# Patient Record
Sex: MALE | Race: WHITE | NOT HISPANIC OR LATINO | URBAN - METROPOLITAN AREA
[De-identification: names, ages, dates, MRNs, and addresses within clinical notes are randomized per-mention and may not be internally consistent; named-entity substitution may affect disease eponyms.]

---

## 2017-12-07 ENCOUNTER — APPOINTMENT (RX ONLY)
Dept: URBAN - METROPOLITAN AREA CLINIC 4 | Facility: CLINIC | Age: 62
Setting detail: DERMATOLOGY
End: 2017-12-07

## 2017-12-07 DIAGNOSIS — L81.4 OTHER MELANIN HYPERPIGMENTATION: ICD-10-CM

## 2017-12-07 DIAGNOSIS — L57.8 OTHER SKIN CHANGES DUE TO CHRONIC EXPOSURE TO NONIONIZING RADIATION: ICD-10-CM

## 2017-12-07 DIAGNOSIS — D18.0 HEMANGIOMA: ICD-10-CM

## 2017-12-07 DIAGNOSIS — D22 MELANOCYTIC NEVI: ICD-10-CM

## 2017-12-07 DIAGNOSIS — L85.3 XEROSIS CUTIS: ICD-10-CM

## 2017-12-07 DIAGNOSIS — Z85.820 PERSONAL HISTORY OF MALIGNANT MELANOMA OF SKIN: ICD-10-CM

## 2017-12-07 DIAGNOSIS — L57.0 ACTINIC KERATOSIS: ICD-10-CM

## 2017-12-07 DIAGNOSIS — Z71.89 OTHER SPECIFIED COUNSELING: ICD-10-CM

## 2017-12-07 DIAGNOSIS — L82.1 OTHER SEBORRHEIC KERATOSIS: ICD-10-CM

## 2017-12-07 PROBLEM — D18.01 HEMANGIOMA OF SKIN AND SUBCUTANEOUS TISSUE: Status: ACTIVE | Noted: 2017-12-07

## 2017-12-07 PROBLEM — D48.5 NEOPLASM OF UNCERTAIN BEHAVIOR OF SKIN: Status: ACTIVE | Noted: 2017-12-07

## 2017-12-07 PROBLEM — D22.61 MELANOCYTIC NEVI OF RIGHT UPPER LIMB, INCLUDING SHOULDER: Status: ACTIVE | Noted: 2017-12-07

## 2017-12-07 PROBLEM — D22.72 MELANOCYTIC NEVI OF LEFT LOWER LIMB, INCLUDING HIP: Status: ACTIVE | Noted: 2017-12-07

## 2017-12-07 PROBLEM — D22.71 MELANOCYTIC NEVI OF RIGHT LOWER LIMB, INCLUDING HIP: Status: ACTIVE | Noted: 2017-12-07

## 2017-12-07 PROBLEM — D22.5 MELANOCYTIC NEVI OF TRUNK: Status: ACTIVE | Noted: 2017-12-07

## 2017-12-07 PROBLEM — D22.62 MELANOCYTIC NEVI OF LEFT UPPER LIMB, INCLUDING SHOULDER: Status: ACTIVE | Noted: 2017-12-07

## 2017-12-07 PROCEDURE — 17003 DESTRUCT PREMALG LES 2-14: CPT

## 2017-12-07 PROCEDURE — ? OBSERVATION

## 2017-12-07 PROCEDURE — ? TREATMENT REGIMEN

## 2017-12-07 PROCEDURE — ? BIOPSY BY SHAVE METHOD

## 2017-12-07 PROCEDURE — 11100: CPT | Mod: 59

## 2017-12-07 PROCEDURE — ? LIQUID NITROGEN

## 2017-12-07 PROCEDURE — ? COUNSELING

## 2017-12-07 PROCEDURE — 99214 OFFICE O/P EST MOD 30 MIN: CPT | Mod: 25

## 2017-12-07 PROCEDURE — 17000 DESTRUCT PREMALG LESION: CPT

## 2017-12-07 PROCEDURE — 11101: CPT

## 2017-12-07 ASSESSMENT — LOCATION DETAILED DESCRIPTION DERM
LOCATION DETAILED: RIGHT RADIAL DORSAL HAND
LOCATION DETAILED: LEFT INFERIOR FOREHEAD
LOCATION DETAILED: RIGHT DORSAL FOOT
LOCATION DETAILED: RIGHT SUPERIOR CRUS OF ANTIHELIX
LOCATION DETAILED: RIGHT VENTRAL PROXIMAL FOREARM
LOCATION DETAILED: RIGHT INFERIOR MEDIAL UPPER BACK
LOCATION DETAILED: RIGHT INFERIOR FOREHEAD
LOCATION DETAILED: MID POSTERIOR NECK
LOCATION DETAILED: RIGHT MEDIAL UPPER BACK
LOCATION DETAILED: LEFT ANTERIOR DISTAL UPPER ARM
LOCATION DETAILED: LEFT DISTAL PRETIBIAL REGION
LOCATION DETAILED: PERIUMBILICAL SKIN
LOCATION DETAILED: STERNUM
LOCATION DETAILED: RIGHT ANTERIOR PROXIMAL THIGH
LOCATION DETAILED: RIGHT PROXIMAL PRETIBIAL REGION
LOCATION DETAILED: EPIGASTRIC SKIN
LOCATION DETAILED: RIGHT MID-UPPER BACK
LOCATION DETAILED: LEFT DORSAL FOOT
LOCATION DETAILED: RIGHT MEDIAL ZYGOMA
LOCATION DETAILED: RIGHT SUPERIOR MEDIAL UPPER BACK
LOCATION DETAILED: RIGHT MEDIAL FOREHEAD
LOCATION DETAILED: LEFT INFERIOR LATERAL FOREHEAD
LOCATION DETAILED: LEFT DISTAL POSTERIOR THIGH
LOCATION DETAILED: RIGHT CENTRAL TEMPLE
LOCATION DETAILED: LEFT PROXIMAL DORSAL FOREARM
LOCATION DETAILED: LEFT RADIAL DORSAL HAND
LOCATION DETAILED: LEFT VENTRAL PROXIMAL FOREARM
LOCATION DETAILED: RIGHT PROXIMAL DORSAL FOREARM
LOCATION DETAILED: XIPHOID
LOCATION DETAILED: LEFT ANTERIOR DISTAL THIGH
LOCATION DETAILED: RIGHT CENTRAL FRONTAL SCALP
LOCATION DETAILED: RIGHT DISTAL POSTERIOR THIGH
LOCATION DETAILED: LEFT SUPERIOR HELIX
LOCATION DETAILED: LEFT CENTRAL TEMPLE
LOCATION DETAILED: LEFT MEDIAL TEMPLE

## 2017-12-07 ASSESSMENT — LOCATION SIMPLE DESCRIPTION DERM
LOCATION SIMPLE: RIGHT TEMPLE
LOCATION SIMPLE: RIGHT UPPER BACK
LOCATION SIMPLE: SCALP
LOCATION SIMPLE: RIGHT FOREARM
LOCATION SIMPLE: LEFT THIGH
LOCATION SIMPLE: LEFT PRETIBIAL REGION
LOCATION SIMPLE: CHEST
LOCATION SIMPLE: LEFT FOOT
LOCATION SIMPLE: RIGHT EAR
LOCATION SIMPLE: POSTERIOR NECK
LOCATION SIMPLE: RIGHT POSTERIOR THIGH
LOCATION SIMPLE: ABDOMEN
LOCATION SIMPLE: RIGHT FOREHEAD
LOCATION SIMPLE: RIGHT THIGH
LOCATION SIMPLE: LEFT TEMPLE
LOCATION SIMPLE: LEFT HAND
LOCATION SIMPLE: LEFT FOREHEAD
LOCATION SIMPLE: RIGHT PRETIBIAL REGION
LOCATION SIMPLE: LEFT POSTERIOR THIGH
LOCATION SIMPLE: LEFT EAR
LOCATION SIMPLE: LEFT FOREARM
LOCATION SIMPLE: RIGHT FOOT
LOCATION SIMPLE: RIGHT ZYGOMA
LOCATION SIMPLE: LEFT UPPER ARM
LOCATION SIMPLE: RIGHT HAND

## 2017-12-07 ASSESSMENT — LOCATION ZONE DERM
LOCATION ZONE: SCALP
LOCATION ZONE: HAND
LOCATION ZONE: FEET
LOCATION ZONE: TRUNK
LOCATION ZONE: FACE
LOCATION ZONE: EAR
LOCATION ZONE: NECK
LOCATION ZONE: ARM
LOCATION ZONE: LEG

## 2017-12-07 NOTE — PROCEDURE: LIQUID NITROGEN
Render Post-Care Instructions In Note?: no
Consent: The patient's consent was obtained including but not limited to risks of crusting, scabbing, blistering, scarring, darker or lighter pigmentary change, recurrence, incomplete removal and infection.
Number Of Freeze-Thaw Cycles: 2 freeze-thaw cycles
Detail Level: Detailed
Duration Of Freeze Thaw-Cycle (Seconds): 3
Post-Care Instructions: I reviewed with the patient in detail post-care instructions. Patient is to wear sunprotection, and avoid picking at any of the treated lesions. Pt may apply Vaseline to crusted or scabbing areas.

## 2017-12-07 NOTE — PROCEDURE: BIOPSY BY SHAVE METHOD
Anesthesia Type: 1% lidocaine with 1:100,000 epinephrine and 408mcg clindamycin/ml and a 1:10 solution of 8.4% sodium bicarbonate
Consent: Written consent was obtained and risks were reviewed including but not limited to scarring, infection, bleeding, scabbing, incomplete removal, nerve damage and allergy to anesthesia.
X Size Of Lesion In Cm: 0
Wound Care: Vaseline
Detail Level: Detailed
Curettage Text: The wound bed was treated with curettage after the biopsy was performed.
Cryotherapy Text: The wound bed was treated with cryotherapy after the biopsy was performed.
Biopsy Method: Personna blade
Destruction After The Procedure: No
Type Of Destruction Used: Curettage
Anesthesia Volume In Cc: 1
Render Post-Care Instructions In Note?: yes
Electrodesiccation Text: The wound bed was treated with electrodesiccation after the biopsy was performed.
Billing Type: Third-Party Bill
Post-Care Instructions: I reviewed with the patient in detail post-care instructions. Patient is to keep the biopsy site dry overnight, and then apply bacitracin twice daily until healed. Patient may apply hydrogen peroxide soaks to remove any crusting.
Biopsy Type: H and E
Electrodesiccation And Curettage Text: The wound bed was treated with electrodesiccation and curettage after the biopsy was performed.
Notification Instructions: Patient will be notified of biopsy results. However, patient instructed to call the office if not contacted within 2 weeks.
Dressing: bandage
Hemostasis: Drysol
Silver Nitrate Text: The wound bed was treated with silver nitrate after the biopsy was performed.
Lab: [id number]
Lab Facility:

## 2018-08-23 ENCOUNTER — OFFICE VISIT (OUTPATIENT)
Dept: CARDIOLOGY | Facility: MEDICAL CENTER | Age: 63
End: 2018-08-23
Payer: COMMERCIAL

## 2018-08-23 ENCOUNTER — TELEPHONE (OUTPATIENT)
Dept: CARDIOLOGY | Facility: MEDICAL CENTER | Age: 63
End: 2018-08-23

## 2018-08-23 VITALS
HEART RATE: 72 BPM | WEIGHT: 220 LBS | BODY MASS INDEX: 32.58 KG/M2 | OXYGEN SATURATION: 96 % | DIASTOLIC BLOOD PRESSURE: 84 MMHG | HEIGHT: 69 IN | RESPIRATION RATE: 14 BRPM | SYSTOLIC BLOOD PRESSURE: 136 MMHG

## 2018-08-23 DIAGNOSIS — E78.5 DYSLIPIDEMIA: ICD-10-CM

## 2018-08-23 DIAGNOSIS — I10 ESSENTIAL HYPERTENSION: ICD-10-CM

## 2018-08-23 DIAGNOSIS — R93.1 ABNORMAL NUCLEAR CARDIAC IMAGING TEST: ICD-10-CM

## 2018-08-23 DIAGNOSIS — I20.0 UNSTABLE ANGINA PECTORIS (HCC): ICD-10-CM

## 2018-08-23 PROBLEM — M51.16 LUMBAR DISC DISEASE WITH RADICULOPATHY: Status: ACTIVE | Noted: 2018-08-23

## 2018-08-23 PROBLEM — Z87.39 HISTORY OF CHRONIC BACK PAIN: Status: ACTIVE | Noted: 2018-08-23

## 2018-08-23 LAB — EKG IMPRESSION: NORMAL

## 2018-08-23 PROCEDURE — 99204 OFFICE O/P NEW MOD 45 MIN: CPT | Mod: 25 | Performed by: INTERNAL MEDICINE

## 2018-08-23 PROCEDURE — 93000 ELECTROCARDIOGRAM COMPLETE: CPT | Performed by: INTERNAL MEDICINE

## 2018-08-23 RX ORDER — AMLODIPINE BESYLATE 5 MG/1
5 TABLET ORAL DAILY
Status: ON HOLD | COMMUNITY
Start: 2018-07-23 | End: 2018-10-24

## 2018-08-23 RX ORDER — ATORVASTATIN CALCIUM 40 MG/1
40 TABLET, FILM COATED ORAL
COMMUNITY
Start: 2018-08-14 | End: 2018-09-12

## 2018-08-23 RX ORDER — NITROGLYCERIN 0.4 MG/1
TABLET SUBLINGUAL
Refills: 3 | Status: ON HOLD | COMMUNITY
Start: 2018-08-14 | End: 2018-10-29

## 2018-08-23 RX ORDER — HYDROCODONE BITARTRATE AND ACETAMINOPHEN 5; 325 MG/1; MG/1
1 TABLET ORAL
Refills: 0 | Status: ON HOLD | COMMUNITY
Start: 2018-07-24 | End: 2018-10-24

## 2018-08-23 RX ORDER — GABAPENTIN 300 MG/1
900 CAPSULE ORAL
Status: ON HOLD | COMMUNITY
Start: 2018-07-23 | End: 2022-07-01

## 2018-08-23 RX ORDER — LISINOPRIL AND HYDROCHLOROTHIAZIDE 20; 12.5 MG/1; MG/1
1 TABLET ORAL DAILY
Refills: 3 | Status: ON HOLD | COMMUNITY
Start: 2018-07-30 | End: 2018-10-29

## 2018-08-23 RX ORDER — CYCLOBENZAPRINE HCL 10 MG
10 TABLET ORAL
Refills: 0 | Status: ON HOLD | COMMUNITY
Start: 2018-07-18 | End: 2022-07-01

## 2018-08-23 RX ORDER — LIDOCAINE 50 MG/G
PATCH TOPICAL
Refills: 0 | COMMUNITY
Start: 2018-07-31 | End: 2022-06-24

## 2018-08-23 ASSESSMENT — ENCOUNTER SYMPTOMS
LOSS OF CONSCIOUSNESS: 0
ABDOMINAL PAIN: 0
ORTHOPNEA: 0
PND: 0
MYALGIAS: 0
PALPITATIONS: 0
SHORTNESS OF BREATH: 1
FEVER: 0
DIZZINESS: 0
INSOMNIA: 0
CHILLS: 0
BLURRED VISION: 0

## 2018-08-23 NOTE — PROGRESS NOTES
Chief Complaint   Patient presents with   • New Patient     Onset dancing in July 2018, had SOB and burning in chest. Saw PCP last week. Did climb [place] without any issues.       Subjective:   Santi Landers is a 62 y.o. male who presents today referred by his PCP Dr. Meli Ramos for cardiology consultation for evaluation of new onset of angina pectoris and an abnormal myocardial perfusion scan.    The patient has a significant past medical history of hypertension, hyperlipidemia, lumbar disc disease involving L4 requiring repeat ablation therapy.    On 7/10/2018 the patient was dancing when his wife when he felt sudden onset of burning chest pain and shortness of breath requiring him to sit down.  The patient saw his PCP shortly thereafter and subsequently underwent a myocardial perfusion stress test on 8/16/2018 which was abnormal showing anterior perfusion abnormalities.    The patient has resumed his usual physical activities without any recurrence of his chest discomfort.    History reviewed. No pertinent past medical history.  History reviewed. No pertinent surgical history.  History reviewed. No pertinent family history.  Social History     Social History   • Marital status:      Spouse name: N/A   • Number of children: N/A   • Years of education: N/A     Occupational History   • Not on file.     Social History Main Topics   • Smoking status: Never Smoker   • Smokeless tobacco: Never Used   • Alcohol use Yes   • Drug use: No   • Sexual activity: Not on file     Other Topics Concern   • Not on file     Social History Narrative   • No narrative on file     No Known Allergies  Outpatient Encounter Prescriptions as of 8/23/2018   Medication Sig Dispense Refill   • atorvastatin (LIPITOR) 40 MG Tab Take 40 mg by mouth every bedtime.     • aspirin EC (ECOTRIN) 325 MG Tablet Delayed Response TAKE 1 TABLET (325 MG) BY MOUTH DAILY.  3   • nitroglycerin (NITROSTAT) 0.4 MG SL Tab PLACE 1 TABLET (0.4 MG) UNDER  "TONGUE EVERY 5 MINUTES AS NEEDED FOR CHEST PAIN AS DIRECTED  3   • gabapentin (NEURONTIN) 300 MG Cap TAKE 300MG TWICE A DAY & MID DAY 200MG     • amLODIPine (NORVASC) 5 MG Tab Take 5 mg by mouth every day.     • lidocaine (LIDODERM) 5 % Patch PLACE 1-3 PATCHES ON THE SKIN ANYWHERE YOU HAVE PAIN. WEAR 12 HOURS ON AND 12 HOURS OFF  0   • lisinopril-hydrochlorothiazide (PRINZIDE, ZESTORETIC) 20-12.5 MG per tablet TAKE 1 TABLET BY MOUTH DAILY  3   • cyclobenzaprine (FLEXERIL) 10 MG Tab TAKE 1 TABLET BY MOUTH EVERYDAY AT BEDTIME  0   • HYDROcodone-acetaminophen (NORCO) 5-325 MG Tab per tablet Take 1 Tab by mouth 1 time daily as needed.  0   • ibuprofen (MOTRIN) 200 MG TABS Take 200 mg by mouth every 6 hours as needed.     • metoprolol (LOPRESSOR) 25 MG Tab TAKE 1 TABLET (25 MG) BY MOUTH 2 TIMES DAILY.  3   • acetaminophen (TYLENOL) 325 MG TABS Take 650 mg by mouth every four hours as needed.       No facility-administered encounter medications on file as of 8/23/2018.      Review of Systems   Constitutional: Negative for chills and fever.   HENT: Negative for congestion.    Eyes: Negative for blurred vision.   Respiratory: Positive for shortness of breath.    Cardiovascular: Positive for chest pain. Negative for palpitations, orthopnea, leg swelling and PND.   Gastrointestinal: Negative for abdominal pain.   Genitourinary: Negative for dysuria.   Musculoskeletal: Negative for joint pain and myalgias.   Skin: Negative for rash.   Neurological: Negative for dizziness and loss of consciousness.   Psychiatric/Behavioral: The patient does not have insomnia.         Objective:   /84   Pulse 72   Resp 14   Ht 1.753 m (5' 9\")   Wt 99.8 kg (220 lb)   SpO2 96%   BMI 32.49 kg/m²     Physical Exam   Constitutional: He is oriented to person, place, and time. He appears well-developed and well-nourished.   HENT:   Head: Normocephalic and atraumatic.   Eyes: Pupils are equal, round, and reactive to light. EOM are normal. No " scleral icterus.   Neck: Neck supple. No JVD present. No thyromegaly present.   Cardiovascular: Normal rate, regular rhythm, normal heart sounds and intact distal pulses.  Exam reveals no gallop and no friction rub.    No murmur heard.  Pulmonary/Chest: Effort normal and breath sounds normal. No respiratory distress. He has no wheezes. He has no rales.   Abdominal: Soft. Bowel sounds are normal. He exhibits no mass. There is no tenderness.   Musculoskeletal: Normal range of motion. He exhibits no edema or deformity.   Lymphadenopathy:     He has no cervical adenopathy.   Neurological: He is alert and oriented to person, place, and time. No cranial nerve deficit. He exhibits normal muscle tone.   Skin: Skin is warm and dry.   Psychiatric: He has a normal mood and affect. His behavior is normal.     02/23/2018 EKG: Normal sinus rhythm, rate 65.  Early transition.    08/16/2018 MPI: Anterior perfusion abnormality.    Assessment:     1. Unstable angina pectoris (HCC)     2. Abnormal nuclear cardiac imaging test     3. Essential hypertension  EKG   4. Dyslipidemia         Medical Decision Making:  Today's Assessment / Status / Plan:     Plan and Discussion:  1.  I had a detailed discussion with the patient concerning his clinical symptoms consistent with unstable angina pectoris and the significance of the results of his myocardial perfusion scan suggesting obstructive coronary artery disease in addition to associated ventricular ectopy.  2.  After discussion of the options of his like to proceed with a coronary angiogram reviewing the rationale of the angiogram, potential therapeutic scenarios and potential risks of the procedure.  3.  He will continue his current medical therapy.    Thank you for allowing me see this gentleman in consultation.

## 2018-08-23 NOTE — TELEPHONE ENCOUNTER
Patient is scheduled on 8-29-18 for a C w/poss with Dr. Browning. No meds to stop. Patient was told to check in at 9:00 for an 11:00 procedure. H&P was done on 8-23-18 by Dr. Palacios. Pre admit to call patient due to him living out of area.

## 2018-08-23 NOTE — LETTER
[facility]   [address], [facility] [address]  [place] [address]  Phone: [phone]  Fax: [phone]              Santi Landers  1955    Encounter Date: 8/23/2018    Maciej Palacios M.D.          PROGRESS NOTE:  Chief Complaint   Patient presents with   • New Patient     Onset dancing in July 2018, had SOB and burning in chest. Saw PCP last week. Did climb [place] without any issues.       Subjective:   Santi Landers is a 62 y.o. male who presents today referred by his PCP Dr. Meli Ramos for cardiology consultation for evaluation of new onset of angina pectoris and an abnormal myocardial perfusion scan.    The patient has a significant past medical history of hypertension, hyperlipidemia, lumbar disc disease involving L4 requiring repeat ablation therapy.    On 7/10/2018 the patient was dancing when his wife when he felt sudden onset of burning chest pain and shortness of breath requiring him to sit down.  The patient saw his PCP shortly thereafter and subsequently underwent a myocardial perfusion stress test on 8/16/2018 which was abnormal showing anterior perfusion abnormalities.    The patient has resumed his usual physical activities without any recurrence of his chest discomfort.    History reviewed. No pertinent past medical history.  History reviewed. No pertinent surgical history.  History reviewed. No pertinent family history.  Social History     Social History   • Marital status:      Spouse name: N/A   • Number of children: N/A   • Years of education: N/A     Occupational History   • Not on file.     Social History Main Topics   • Smoking status: Never Smoker   • Smokeless tobacco: Never Used   • Alcohol use Yes   • Drug use: No   • Sexual activity: Not on file     Other Topics Concern   • Not on file     Social History Narrative   • No narrative on file     No Known Allergies  Outpatient Encounter Prescriptions as of 8/23/2018   Medication  "Sig Dispense Refill   • atorvastatin (LIPITOR) 40 MG Tab Take 40 mg by mouth every bedtime.     • aspirin EC (ECOTRIN) 325 MG Tablet Delayed Response TAKE 1 TABLET (325 MG) BY MOUTH DAILY.  3   • nitroglycerin (NITROSTAT) 0.4 MG SL Tab PLACE 1 TABLET (0.4 MG) UNDER TONGUE EVERY 5 MINUTES AS NEEDED FOR CHEST PAIN AS DIRECTED  3   • gabapentin (NEURONTIN) 300 MG Cap TAKE 300MG TWICE A DAY & MID DAY 200MG     • amLODIPine (NORVASC) 5 MG Tab Take 5 mg by mouth every day.     • lidocaine (LIDODERM) 5 % Patch PLACE 1-3 PATCHES ON THE SKIN ANYWHERE YOU HAVE PAIN. WEAR 12 HOURS ON AND 12 HOURS OFF  0   • lisinopril-hydrochlorothiazide (PRINZIDE, ZESTORETIC) 20-12.5 MG per tablet TAKE 1 TABLET BY MOUTH DAILY  3   • cyclobenzaprine (FLEXERIL) 10 MG Tab TAKE 1 TABLET BY MOUTH EVERYDAY AT BEDTIME  0   • HYDROcodone-acetaminophen (NORCO) 5-325 MG Tab per tablet Take 1 Tab by mouth 1 time daily as needed.  0   • ibuprofen (MOTRIN) 200 MG TABS Take 200 mg by mouth every 6 hours as needed.     • metoprolol (LOPRESSOR) 25 MG Tab TAKE 1 TABLET (25 MG) BY MOUTH 2 TIMES DAILY.  3   • acetaminophen (TYLENOL) 325 MG TABS Take 650 mg by mouth every four hours as needed.       No facility-administered encounter medications on file as of 8/23/2018.      Review of Systems   Constitutional: Negative for chills and fever.   HENT: Negative for congestion.    Eyes: Negative for blurred vision.   Respiratory: Positive for shortness of breath.    Cardiovascular: Positive for chest pain. Negative for palpitations, orthopnea, leg swelling and PND.   Gastrointestinal: Negative for abdominal pain.   Genitourinary: Negative for dysuria.   Musculoskeletal: Negative for joint pain and myalgias.   Skin: Negative for rash.   Neurological: Negative for dizziness and loss of consciousness.   Psychiatric/Behavioral: The patient does not have insomnia.         Objective:   /84   Pulse 72   Resp 14   Ht 1.753 m (5' 9\")   Wt 99.8 kg (220 lb)   SpO2 " 96%   BMI 32.49 kg/m²      Physical Exam   Constitutional: He is oriented to person, place, and time. He appears well-developed and well-nourished.   HENT:   Head: Normocephalic and atraumatic.   Eyes: Pupils are equal, round, and reactive to light. EOM are normal. No scleral icterus.   Neck: Neck supple. No JVD present. No thyromegaly present.   Cardiovascular: Normal rate, regular rhythm, normal heart sounds and intact distal pulses.  Exam reveals no gallop and no friction rub.    No murmur heard.  Pulmonary/Chest: Effort normal and breath sounds normal. No respiratory distress. He has no wheezes. He has no rales.   Abdominal: Soft. Bowel sounds are normal. He exhibits no mass. There is no tenderness.   Musculoskeletal: Normal range of motion. He exhibits no edema or deformity.   Lymphadenopathy:     He has no cervical adenopathy.   Neurological: He is alert and oriented to person, place, and time. No cranial nerve deficit. He exhibits normal muscle tone.   Skin: Skin is warm and dry.   Psychiatric: He has a normal mood and affect. His behavior is normal.     02/23/2018 EKG: Normal sinus rhythm, rate 65.  Early transition.    08/16/2018 MPI: Anterior perfusion abnormality.    Assessment:     1. Unstable angina pectoris (HCC)     2. Abnormal nuclear cardiac imaging test     3. Essential hypertension  EKG   4. Dyslipidemia         Medical Decision Making:  Today's Assessment / Status / Plan:     Plan and Discussion:  1.  I had a detailed discussion with the patient concerning his clinical symptoms consistent with unstable angina pectoris and the significance of the results of his myocardial perfusion scan suggesting obstructive coronary artery disease in addition to associated ventricular ectopy.  2.  After discussion of the options of his like to proceed with a coronary angiogram reviewing the rationale of the angiogram, potential therapeutic scenarios and potential risks of the procedure.  3.  He will continue  his current medical therapy.    Thank you for allowing me see this gentleman in consultation.      Meli Ramos M.D.  [id number] [address] [address]  VIA Facsimile: [phone]

## 2018-08-29 ENCOUNTER — APPOINTMENT (OUTPATIENT)
Dept: RADIOLOGY | Facility: MEDICAL CENTER | Age: 63
End: 2018-08-29
Attending: INTERNAL MEDICINE
Payer: COMMERCIAL

## 2018-08-29 ENCOUNTER — HOSPITAL ENCOUNTER (OUTPATIENT)
Facility: MEDICAL CENTER | Age: 63
End: 2018-08-29
Attending: INTERNAL MEDICINE | Admitting: INTERNAL MEDICINE
Payer: COMMERCIAL

## 2018-08-29 VITALS
HEART RATE: 76 BPM | WEIGHT: 216 LBS | OXYGEN SATURATION: 94 % | SYSTOLIC BLOOD PRESSURE: 140 MMHG | RESPIRATION RATE: 17 BRPM | DIASTOLIC BLOOD PRESSURE: 93 MMHG | TEMPERATURE: 97.5 F | HEIGHT: 69 IN | BODY MASS INDEX: 31.99 KG/M2

## 2018-08-29 LAB
ALBUMIN SERPL BCP-MCNC: 4.3 G/DL (ref 3.2–4.9)
ALBUMIN/GLOB SERPL: 1.9 G/DL
ALP SERPL-CCNC: 45 U/L (ref 30–99)
ALT SERPL-CCNC: 46 U/L (ref 2–50)
ANION GAP SERPL CALC-SCNC: 9 MMOL/L (ref 0–11.9)
APTT PPP: 24.9 SEC (ref 24.7–36)
AST SERPL-CCNC: 27 U/L (ref 12–45)
BILIRUB SERPL-MCNC: 0.9 MG/DL (ref 0.1–1.5)
BUN SERPL-MCNC: 20 MG/DL (ref 8–22)
CALCIUM SERPL-MCNC: 9.3 MG/DL (ref 8.5–10.5)
CHLORIDE SERPL-SCNC: 104 MMOL/L (ref 96–112)
CO2 SERPL-SCNC: 25 MMOL/L (ref 20–33)
CREAT SERPL-MCNC: 0.83 MG/DL (ref 0.5–1.4)
EKG IMPRESSION: NORMAL
ERYTHROCYTE [DISTWIDTH] IN BLOOD BY AUTOMATED COUNT: 42.5 FL (ref 35.9–50)
GLOBULIN SER CALC-MCNC: 2.3 G/DL (ref 1.9–3.5)
GLUCOSE SERPL-MCNC: 99 MG/DL (ref 65–99)
HCT VFR BLD AUTO: 42.8 % (ref 42–52)
HGB BLD-MCNC: 15.2 G/DL (ref 14–18)
INR PPP: 1.02 (ref 0.87–1.13)
MCH RBC QN AUTO: 34.5 PG (ref 27–33)
MCHC RBC AUTO-ENTMCNC: 35.5 G/DL (ref 33.7–35.3)
MCV RBC AUTO: 97.1 FL (ref 81.4–97.8)
PLATELET # BLD AUTO: 167 K/UL (ref 164–446)
PMV BLD AUTO: 9.1 FL (ref 9–12.9)
POTASSIUM SERPL-SCNC: 4.3 MMOL/L (ref 3.6–5.5)
PROT SERPL-MCNC: 6.6 G/DL (ref 6–8.2)
PROTHROMBIN TIME: 13.1 SEC (ref 12–14.6)
RBC # BLD AUTO: 4.41 M/UL (ref 4.7–6.1)
SODIUM SERPL-SCNC: 138 MMOL/L (ref 135–145)
WBC # BLD AUTO: 6 K/UL (ref 4.8–10.8)

## 2018-08-29 PROCEDURE — 99152 MOD SED SAME PHYS/QHP 5/>YRS: CPT

## 2018-08-29 PROCEDURE — 160002 HCHG RECOVERY MINUTES (STAT)

## 2018-08-29 PROCEDURE — 700105 HCHG RX REV CODE 258: Performed by: INTERNAL MEDICINE

## 2018-08-29 PROCEDURE — 85027 COMPLETE CBC AUTOMATED: CPT

## 2018-08-29 PROCEDURE — 700111 HCHG RX REV CODE 636 W/ 250 OVERRIDE (IP)

## 2018-08-29 PROCEDURE — 93458 L HRT ARTERY/VENTRICLE ANGIO: CPT

## 2018-08-29 PROCEDURE — C1887 CATHETER, GUIDING: HCPCS

## 2018-08-29 PROCEDURE — 85610 PROTHROMBIN TIME: CPT

## 2018-08-29 PROCEDURE — C1769 GUIDE WIRE: HCPCS

## 2018-08-29 PROCEDURE — 307093 HCHG TR BAND RADIAL

## 2018-08-29 PROCEDURE — C1894 INTRO/SHEATH, NON-LASER: HCPCS

## 2018-08-29 PROCEDURE — 360979 HCHG DIAGNOSTIC CATH

## 2018-08-29 PROCEDURE — 700101 HCHG RX REV CODE 250

## 2018-08-29 PROCEDURE — 71046 X-RAY EXAM CHEST 2 VIEWS: CPT

## 2018-08-29 PROCEDURE — 93010 ELECTROCARDIOGRAM REPORT: CPT | Performed by: INTERNAL MEDICINE

## 2018-08-29 PROCEDURE — 85730 THROMBOPLASTIN TIME PARTIAL: CPT

## 2018-08-29 PROCEDURE — 93005 ELECTROCARDIOGRAM TRACING: CPT | Performed by: INTERNAL MEDICINE

## 2018-08-29 PROCEDURE — 80053 COMPREHEN METABOLIC PANEL: CPT

## 2018-08-29 PROCEDURE — 99153 MOD SED SAME PHYS/QHP EA: CPT

## 2018-08-29 RX ORDER — DIPHENHYDRAMINE HYDROCHLORIDE 50 MG/ML
25 INJECTION INTRAMUSCULAR; INTRAVENOUS EVERY 6 HOURS PRN
Status: DISCONTINUED | OUTPATIENT
Start: 2018-08-29 | End: 2018-08-29 | Stop reason: HOSPADM

## 2018-08-29 RX ORDER — VERAPAMIL HYDROCHLORIDE 2.5 MG/ML
INJECTION, SOLUTION INTRAVENOUS
Status: COMPLETED
Start: 2018-08-29 | End: 2018-08-29

## 2018-08-29 RX ORDER — ACETAMINOPHEN 325 MG/1
650 TABLET ORAL EVERY 6 HOURS PRN
Status: DISCONTINUED | OUTPATIENT
Start: 2018-08-29 | End: 2018-08-29 | Stop reason: HOSPADM

## 2018-08-29 RX ORDER — SODIUM CHLORIDE 9 MG/ML
INJECTION, SOLUTION INTRAVENOUS
Status: DISCONTINUED | OUTPATIENT
Start: 2018-08-29 | End: 2018-08-29

## 2018-08-29 RX ORDER — MIDAZOLAM HYDROCHLORIDE 1 MG/ML
INJECTION INTRAMUSCULAR; INTRAVENOUS
Status: COMPLETED
Start: 2018-08-29 | End: 2018-08-29

## 2018-08-29 RX ORDER — HEPARIN SODIUM,PORCINE 1000/ML
VIAL (ML) INJECTION
Status: COMPLETED
Start: 2018-08-29 | End: 2018-08-29

## 2018-08-29 RX ORDER — ONDANSETRON 2 MG/ML
4 INJECTION INTRAMUSCULAR; INTRAVENOUS EVERY 4 HOURS PRN
Status: DISCONTINUED | OUTPATIENT
Start: 2018-08-29 | End: 2018-08-29 | Stop reason: HOSPADM

## 2018-08-29 RX ORDER — BIVALIRUDIN 250 MG/5ML
INJECTION, POWDER, LYOPHILIZED, FOR SOLUTION INTRAVENOUS
Status: COMPLETED
Start: 2018-08-29 | End: 2018-08-29

## 2018-08-29 RX ORDER — SODIUM CHLORIDE 9 MG/ML
INJECTION, SOLUTION INTRAVENOUS CONTINUOUS
Status: DISCONTINUED | OUTPATIENT
Start: 2018-08-29 | End: 2018-08-29 | Stop reason: HOSPADM

## 2018-08-29 RX ORDER — LIDOCAINE HYDROCHLORIDE 10 MG/ML
INJECTION, SOLUTION INFILTRATION; PERINEURAL
Status: COMPLETED
Start: 2018-08-29 | End: 2018-08-29

## 2018-08-29 RX ADMIN — MIDAZOLAM 2 MG: 1 INJECTION INTRAMUSCULAR; INTRAVENOUS at 12:10

## 2018-08-29 RX ADMIN — FENTANYL CITRATE 100 MCG: 50 INJECTION, SOLUTION INTRAMUSCULAR; INTRAVENOUS at 12:04

## 2018-08-29 RX ADMIN — FENTANYL CITRATE 100 MCG: 50 INJECTION, SOLUTION INTRAMUSCULAR; INTRAVENOUS at 12:21

## 2018-08-29 RX ADMIN — SODIUM CHLORIDE: 9 INJECTION, SOLUTION INTRAVENOUS at 10:15

## 2018-08-29 RX ADMIN — VERAPAMIL HYDROCHLORIDE 5 MG: 2.5 INJECTION, SOLUTION INTRAVENOUS at 11:39

## 2018-08-29 RX ADMIN — SODIUM CHLORIDE: 9 INJECTION, SOLUTION INTRAVENOUS at 12:45

## 2018-08-29 RX ADMIN — LIDOCAINE HYDROCHLORIDE: 10 INJECTION, SOLUTION INFILTRATION; PERINEURAL at 11:39

## 2018-08-29 RX ADMIN — MIDAZOLAM 2 MG: 1 INJECTION INTRAMUSCULAR; INTRAVENOUS at 11:51

## 2018-08-29 RX ADMIN — NITROGLYCERIN 10 ML: 20 INJECTION INTRAVENOUS at 11:39

## 2018-08-29 RX ADMIN — HEPARIN SODIUM: 1000 INJECTION, SOLUTION INTRAVENOUS; SUBCUTANEOUS at 11:39

## 2018-08-29 RX ADMIN — HEPARIN SODIUM: 200 INJECTION, SOLUTION INTRAVENOUS at 10:45

## 2018-08-29 ASSESSMENT — PAIN SCALES - GENERAL
PAINLEVEL_OUTOF10: 0

## 2018-08-29 NOTE — OR NURSING
1230   PATIENT RECEIVED FROM CATH LAB,  S/P LEFT HEART CATH VIA RIGHT WRIST.  TR BAND INTACT IN RIGHT WRIST.  DR LAWSON TALKING TO WIFE.      1300   TR BAND SITE IS CLEAR.  CARDIAC SURGEON IS HERE TALKING TO PATIENT AND WIFE IN LENGTH.     1330   1ST 2CC OF AIR RELEASED FROM TR BAND.  SITE IS CLEAR.    1350   NEXT 3CC OF AIR RELEASED FROM TR BAND.  SITE IS CLEAR.    1410  NEXT 3CC OF AIR RELEASED FROM TR BAND.  SITE IS CLEAR.    1430   LAST 3CC OF AIR RELEASED FROM TR BAND.  SITE IS CLEAR.  CALL TO DR LAWSON FOR DC'S ORDER.  AWAITING TO HEAR BACK.    1450   TR BAND REMOVED AND 2X2 WITH TEGADERM AND COBAN APPLIED.  PATIENT TAKING PO FLUID WELL.    1500  Dr LAWSON IS HERE TO TALK TO PATIENT AND WIFE IN LENGTH.  OK TO BE DC HOME.  APPOINTMENT WILL BE MADE TO BRING PATIENT AND WIFE BACK FOR THEIR DECISION OF WHEN SURGERY VS STENT PLACEMENT.    1515   DC INSTRUCTIONS GIVEN TO PATIENT AND WIFE.  VERBALIZED UNDERSTANDING OF ALL.  HL DC. PATIENT DC TO HOME,  AMBULATORY,  ESCORTED OUT BY RN.

## 2018-08-29 NOTE — CATH LAB
Immediate Post-Operative Note      PreOp Diagnosis: New onset angina with abn MPI     PostOp Diagnosis: Multivessel CAD, NL LVSF    Procedure(s) :  Coronary Angiography, Left Heart Catheterization, Left Ventriculography    Surgeon(s):  Rui Browning M.D.    Type of Anesthesia: Moderate Sedation    Specimen: None    Estimated Blood Loss: 10 cc's    Findings: Relatively long, calcified 80% prox LAD after medium sized D1 with 70% mid stenosis, subtotal D2 at ostium with DORIAN I flow, prob under 1.5 mm in diameter, 70-80% mid LCX, mod dis prox PLB    Complications: none    Plan; Will discuss the case with his primary cardiologist (Dr. Palacios) and CV surgery      Rui Browning M.D.  8/29/2018 12:54 PM

## 2018-08-29 NOTE — DISCHARGE INSTRUCTIONS
ACTIVITY: Rest and take it easy for the first 24 hours.  A responsible adult is recommended to remain with you during that time.  It is normal to feel sleepy.  We encourage you to not do anything that requires balance, judgment or coordination.    MILD FLU-LIKE SYMPTOMS ARE NORMAL. YOU MAY EXPERIENCE GENERALIZED MUSCLE ACHES, THROAT IRRITATION, HEADACHE AND/OR SOME NAUSEA.    FOR 24 HOURS DO NOT:  Drive, operate machinery or run household appliances.  Drink beer or alcoholic beverages.   Make important decisions or sign legal documents.    SPECIAL INSTRUCTIONS: *KEEP INCISION DRY FOR 24 HRS**    DIET: To avoid nausea, slowly advance diet as tolerated, avoiding spicy or greasy foods for the first day.  Add more substantial food to your diet according to your physician's instructions.  Babies can be fed formula or breast milk as soon as they are hungry.  INCREASE FLUIDS AND FIBER TO AVOID CONSTIPATION.    SURGICAL DRESSING/BATHING: *MAY SHOWER TOMORROW AFTERNOON THEN MAY REMOVE DRESSING**    FOLLOW-UP APPOINTMENT:  A follow-up appointment should be arranged with your doctor in *DR HERRON   [phone]**; call to schedule.    You should CALL YOUR PHYSICIAN if you develop:  Fever greater than 101 degrees F.  Pain not relieved by medication, or persistent nausea or vomiting.  Excessive bleeding (blood soaking through dressing) or unexpected drainage from the wound.  Extreme redness or swelling around the incision site, drainage of pus or foul smelling drainage.  Inability to urinate or empty your bladder within 8 hours.  Problems with breathing or chest pain.    You should call 911 if you develop problems with breathing or chest pain.  If you are unable to contact your doctor or surgical center, you should go to the nearest emergency room or urgent care center.  Physician's telephone #: *[phone]**    If any questions arise, call your doctor.  If your doctor is not available, please feel free to call the Surgical  Center at [phone].  The Center is open Monday through Friday from 7AM to 7PM.  You can also call the HEALTH HOTLINE open 24 hours/day, 7 days/week and speak to a nurse at [phone], or toll free at [phone].    A registered nurse may call you a few days after your surgery to see how you are doing after your procedure.    MEDICATIONS: Resume taking daily medication.  Take prescribed pain medication with food.  If no medication is prescribed, you may take non-aspirin pain medication if needed.  PAIN MEDICATION CAN BE VERY CONSTIPATING.  Take a stool softener or laxative such as senokot, pericolace, or milk of magnesia if needed.      If your physician has prescribed pain medication that includes Acetaminophen (Tylenol), do not take additional Acetaminophen (Tylenol) while taking the prescribed medication.    Depression / Suicide Risk    As you are discharged from this [facility] facility, it is important to learn how to keep safe from harming yourself.    Recognize the warning signs:  · Abrupt changes in personality, positive or negative- including increase in energy   · Giving away possessions  · Change in eating patterns- significant weight changes-  positive or negative  · Change in sleeping patterns- unable to sleep or sleeping all the time   · Unwillingness or inability to communicate  · Depression  · Unusual sadness, discouragement and loneliness  · Talk of wanting to die  · Neglect of personal appearance   · Rebelliousness- reckless behavior  · Withdrawal from people/activities they love  · Confusion- inability to concentrate     If you or a loved one observes any of these behaviors or has concerns about self-harm, here's what you can do:  · Talk about it- your feelings and reasons for harming yourself  · Remove any means that you might use to hurt yourself (examples: pills, rope, extension cords, firearm)  · Get professional help from the community (Mental Health, Substance Abuse,  psychological counseling)  · Do not be alone:Call your Safe Contact- someone whom you trust who will be there for you.  · Call your local CRISIS HOTLINE 988-3985 or 663-910-8176  · Call your local Children's Mobile Crisis Response Team [place] (346) 666-3449 or www.[facility]  · Call the toll free National Suicide Prevention Hotlines   · National Suicide Prevention Lifeline 789-275-DMDC (7424)  [facility] Line Network 800-SUICIDE (966-2086)    Radial Catherization Discharge Instructions      · Do not subject hand/arm to any forceful movements for 24 hours    i.e. supporting weight when rising from the chair or bed.   · Do not drive a car for 24 hours  · You may remove the dressing tomorrow  · You may shower on the day following your procedure.  Do not take a tub bath or submerge the puncture site in water for 3 days following the procedure.  · No Lifting more than 3-5 pounds with affected wrist for 5 days  · Follow up with Dr. BELLO**  2-4 weeks.  · Increase fluids for 2 days post procedure.  · Continue all previous medications unless otherwise instructed.    If bleeding should occur following discharge:  · Sit down and apply firm pressure to site with your fingers for 10 minutes  · If the bleeding stops, continue to sit quietly, keeping your wrist straight for 2 hours.  Notify physician as soon as possible ( [phone])  · If bleeding does not stop after 10 minutes, or if there is a large amount of bleeding or spurting, call 911 immediately.  Do not drive yourself to the hospital.

## 2018-08-29 NOTE — CONSULTS
DATE OF SERVICE:  08/29/2018    CARDIAC SURGERY CONSULTATION    REFERRING PHYSICIAN:  Rui Browning MD and Maciej Palacios MD    CONSULTING PHYSICIAN:  Berhane Canela MD    REASON FOR CONSULTATION:  Severe multivessel coronary artery disease.    CHIEF COMPLAINT:  Angina.    HISTORY OF PRESENT ILLNESS:  This is a 62-year-old man with a history of   anginal symptoms and abnormal myocardial perfusion scan, who underwent   elective coronary angiogram today and was found to have severe multivessel   coronary artery disease.  His workup began in July and he notes an episode   when he was dancing with his wife and then he felt sudden onset of burning   anterior chest pain associated with shortness of breath that required him to   sit down.  He had no further recurrence of his symptoms.  He then saw his   primary care physician shortly thereafter, who ordered a myocardial perfusion   scan on 08/16, which was abnormal showing anterior perfusion abnormalities.    He had resumed his usual physical activities while the recurrence of his chest   discomfort and also in July of that year, he did climb [place] without   any further recurrence of his chest pain.  He underwent a coronary angiogram   today, which revealed a relatively long and calcified 80% proximal LAD   stenosis after a medium size diagonal branch with 70% mid stenosis and then a   subtotally occluded second diagonal branch at the ostium with DORIAN 1 flow that   is small and then a 70-80% mid left circumflex lesion with relatively small   distal posterolateral branch.  The right coronary artery was without disease.    The patient was subsequently referred to me for consideration of coronary   artery bypass grafting.  Other than the aforementioned symptoms, he denies any   history of shortness of breath, dyspnea on exertion, lower extremity edema,   weight gain, or palpitations.  He is accompanied by his wife and I met the   patient in the  PPU.    PAST MEDICAL HISTORY:  Hypertension, hyperlipidemia, lumbar disk disease   requiring L4 repeat ablation therapy.    PAST SURGICAL HISTORY:  None.    CURRENT OUTPATIENT MEDICATIONS:  Atorvastatin 40 mg p.o. daily, aspirin 325 mg   p.o. daily, nitroglycerin 0.4 mg sublingual tablet as needed, gabapentin 600   mg p.o. daily, amlodipine 5 mg p.o. daily, lisinopril/hydrochlorothiazide   20/12.5 mg p.o. daily, Flexeril 10 mg p.o. daily, Norco 5/325 mg p.o. p.r.n.,   ibuprofen 200 mg every 6 hours as needed, metoprolol 25 mg p.o. b.i.d.    REVIEW OF SYSTEMS:  A complete 14-point review of systems was performed and is   negative except as noted in the HPI. Denies history of stroke or diabetes.    FAMILY HISTORY:  There is no family history of heart disease.    SOCIAL HISTORY:  The patient lives with his wife in [place].  He does not   smoke and does not drink alcohol.  He denies any illicit drug use.    PHYSICAL EXAMINATION:  VITAL SIGNS:  Pulse 72, respiratory rate 25, satting 95% on room air, blood   pressure 119/75.  GENERAL:  He is a well-appearing adult  man, in no apparent distress,   accompanied by his wife.  HEENT:  Normocephalic, atraumatic.  His oral and nasal mucosa are moist.  His   sclerae are anicteric.  Dentition is fair.  NECK:  There is no jugular venous distention.  There are no carotid bruits.    There is no cervical lymphadenopathy.  RESPIRATORY:  Moves air well bilaterally without use of accessory respiratory   muscles.  CARDIOVASCULAR:  Normal rate, regular rhythm.  ABDOMEN:  Soft, nontender, nondistended.  There are no palpable masses.  EXTREMITIES:  Warm and well perfused.  There is no cyanosis, clubbing, or   edema.  SKIN:  Normal without rashes.  NEUROLOGIC:  He is alert and oriented x3, with no gross deficits.  PSYCHIATRIC:  Mood and affect is normal.    ASSESSMENT:  Severe multivessel coronary artery disease, angina.    PLAN:  This is a 62-year-old man who presented with symptoms  of angina and an   abnormal myocardial perfusion study, was now found to have severe multivessel   coronary artery disease on coronary angiogram.  He was subsequently referred   to me for consideration of coronary artery bypass grafting.  I have discussed   this case in great detail with Dr. Browning as well as the patient.  We   talked about the pros and cons of percutaneous coronary intervention versus   coronary artery bypass grafting surgery.  I believe he is in need of 2   bypasses.  There is a possibility that both internal mammary arteries could be   used if his heart is not enlarged.  We decided that the patient should follow   up with Dr. Palacios, who is his primary cardiologist, for further   discussion and then I will also see him in the office for further discussion   of percutaneous coronary intervention versus coronary artery bypass graft.  He   is a [place] Heart Association class 1 and Child-Keene class A.    Thank you very much for allowing me to participate in the care of this   patient.  We will of course keep you updated of his progress.    I spent greater than 70 minutes in counseling and coordination of care, in   addition to reviewing the diagnostic imaging.       ____________________________________     MD SHUKRI Herr / MARIXA    DD:  08/29/2018 14:52:37  DT:  08/29/2018 15:18:23    D#:  [id number]  Job#:  [id number]

## 2018-08-30 NOTE — PROCEDURES
DATE OF SERVICE:  08/29/2018    REFERRING PHYSICIAN:  Maciej Palacios MD    PREOPERATIVE DIAGNOSIS:  New onset angina with abnormal myocardial perfusion   imaging indicating anterior wall ischemia.    POSTOPERATIVE DIAGNOSES:  1.  Two-vessel coronary artery disease with relatively long calcified 80%   stenosis in proximal portion of left anterior descending artery involving a   medium-sized first diagonal branch and a subtotally occluded likely small   second diagonal branch with DORIAN 1 flow into this branch and 80% stenosis  in the proximal portion of the medium-sized first obtuse marginal branch of the  left circumflex artery along with 60-70% stenosis in mid portion of the first diagonal branch.    2.  Normal left ventricular systolic function and wall motion with   hypertrophic left ventricle.  Post-PVC ejection fraction was greater than 70%.    PROCEDURES:  1.  Left heart catheterization with left ventriculography.  2.  Selective coronary angiography.  3.  Conscious sedation.    COMPLICATIONS:  None.    DESCRIPTION OF PROCEDURE:  After informed consent was obtained, the patient   was brought to cardiac catheterization laboratory in fasting state.  Dhruv   test was carried out on the right hand and found to be negative.  Right wrist   and right groin were then prepped and draped in the usual sterile fashion.    Xylocaine 1% was then used to anesthetize right wrist area and a 6-Vietnamese Terumo   glide sheath was placed in the right radial artery using Seldinger technique.    2.5 mg verapamil, 100 mcg nitroglycerin, and 4000 units of heparin were then   administered into the radial sheath.    Next, selective angiography of the left coronary artery was performed in   multiple views using 5-Vietnamese TIG catheter.  The TIG catheter next did   enter into the left ventricle.  Left ventricular pressure was then recorded.    Left ventriculography was then performed using 24 mL of contrast injected   over 3 seconds.   Left heart pullback was then performed.  The TIG catheter   would not engage the right coronary artery well. It was then exchanged for a   6-Persian JR4 catheter.  Selective angiography of the right coronary artery was   performed in multiple views using JR4.  The JR4 was then removed.  Radial   sheath was subsequently removed.  Hemostasis was obtained using Terumo TR   wrist band.  The patient tolerated this procedure well.  He left cardiac   catheterization laboratory in stable condition.    I supervised monitoring under conscious sedation beginning at 11:50 a.m. until   the end of the case at 12:18 p.m.    FINDINGS:  1.  Hemodynamics:  LV systolic pressure was 92 mmHg with LV end-diastolic   pressure of 15 mmHg.  There was no gradient across the aortic valve.  2.  Left ventriculography showed relatively small and hypertrophic left   ventricle with normal LV wall motion and contractility.  Overall, estimated   ejection fraction is 65-70% post-PVC.  3.  Two-vessel coronary artery disease.    The left main is a large-caliber vessel.  It is somewhat short, but   angiographically free of disease.  It bifurcates into left anterior descending   artery and left circumflex artery.    Left anterior descending is a large-caliber vessel.  It extends slightly past   the apex.  It gave rise to medium-sized first diagonal branch proximally.    This particular branch has 60-70% stenosis in midportion. It is slightly   under 2 mm in diameter.  Left anterior descending has a moderate calcification   in its [id number] portion with relatively long stenosis involving the   origin of the first and second diagonal branch.  The second diagonal branch   has ostial subtotal occlusion with DORIAN 1 antegrade flow and appeared to be   small, likely under 1.5-2 mm in diameter.  Distal left anterior descending   artery has mild diffuse disease, but the antegrade flow remained normal.    Left circumflex artery is medium in size.  It gives  rise to one obtuse   marginal branch in the mid portion.  It terminates into a small distal obtuse   marginal branch and small AV groove branch.  Proximal portion of the first   obtuse marginal branch had eccentric 80% stenosis.  Antegrade flow remained   normal, however.    Right coronary artery is a dominant system.  Right coronary artery is a   large-caliber vessel.  This gives rises to conus branch, a couple acute   marginal branches, medium-sized posterior descending artery and posterolateral   branch.  The ostium and proximal right coronary artery has mild disease in   the range of 20-30%.  The proximal portion of the posterolateral branch also   has some diffuse moderate disease that appeared to be under 2 mm in diameter.    Antegrade flows remain normal.    PLAN:  Aggressive risk factor modification.  We will discuss with   cardiovascular surgery and the patient regarding revascularization option.       ____________________________________     MD CARLOS DAWN / MARIXA    DD:  08/29/2018 16:55:24  DT:  08/29/2018 18:02:29    D#:  [id number]  Job#:  [id number]    cc: NATHALIA GIPSON MD

## 2018-08-31 ENCOUNTER — TELEPHONE (OUTPATIENT)
Dept: CARDIOLOGY | Facility: MEDICAL CENTER | Age: 63
End: 2018-08-31

## 2018-08-31 NOTE — TELEPHONE ENCOUNTER
Kenna called from Dr. Caneal's office, transferred by . Call back for Kenna is : [phone]    Kenna states that patient was consulted by Dr. Canela and may need open heart.  Patient would like Dr. Palacios's opinion, and Kenna states, SW would need to order LORENA before patient follows up with Dr. Canela. Kenna is requesting we scheduling [facility] for patient with Dr. Palacios.   ____    Attempted to contact patient, no answer. LVM to call back to schedule.

## 2018-08-31 NOTE — TELEPHONE ENCOUNTER
returning your call   Received: Today   Message Contents   FERCHO Lemus/Carol Ann       Patient is returning your call from to day. He can be reached at [phone].      Contacted patient, he wants to get Dr. 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      To SW - please advise if you want patient to be seen in clinic, if so I can arrange for 9/14.  Or if you would like to call patient, he's available after 2:30pm during the week.  *Also LORENA may need to be arranged

## 2018-09-01 NOTE — TELEPHONE ENCOUNTER
Patient calls tonight while I am on call.  Admits he is anxious.  Applying for Medicare.  Has to have paperwork again tonight.  I answered a few questions.  I suggested    My partners nurse could call on Tuesday to help assuage some of his anxieties.  He voices understanding

## 2018-09-04 ENCOUNTER — TELEPHONE (OUTPATIENT)
Dept: CARDIOLOGY | Facility: MEDICAL CENTER | Age: 63
End: 2018-09-04

## 2018-09-05 NOTE — TELEPHONE ENCOUNTER
Patient called, transferred by . Explained SW will contact patient to further discuss. Patient agreeable and verbalizes understanding.  Patient denies any symptoms to report at this time.

## 2018-09-05 NOTE — TELEPHONE ENCOUNTER
Kenna called from Dr. Canela's office, transferred by . Calling to inquire if patient is going to be seen.  Explained SW plans to call patient himself.  Stent procedure may be more favorable vs.open heart.  Plan of Care pending.     Kenna is requesting a call back after final decision is made.   Office # [phone]

## 2018-09-12 ENCOUNTER — OFFICE VISIT (OUTPATIENT)
Dept: CARDIOLOGY | Facility: MEDICAL CENTER | Age: 63
End: 2018-09-12
Payer: COMMERCIAL

## 2018-09-12 VITALS
RESPIRATION RATE: 14 BRPM | OXYGEN SATURATION: 95 % | DIASTOLIC BLOOD PRESSURE: 80 MMHG | WEIGHT: 218 LBS | SYSTOLIC BLOOD PRESSURE: 130 MMHG | BODY MASS INDEX: 32.29 KG/M2 | HEART RATE: 88 BPM | HEIGHT: 69 IN

## 2018-09-12 DIAGNOSIS — R93.1 ABNORMAL NUCLEAR CARDIAC IMAGING TEST: ICD-10-CM

## 2018-09-12 DIAGNOSIS — I25.118 CORONARY ARTERY DISEASE OF NATIVE ARTERY OF NATIVE HEART WITH STABLE ANGINA PECTORIS (HCC): ICD-10-CM

## 2018-09-12 DIAGNOSIS — E78.5 DYSLIPIDEMIA: ICD-10-CM

## 2018-09-12 DIAGNOSIS — I10 ESSENTIAL HYPERTENSION, BENIGN: ICD-10-CM

## 2018-09-12 PROCEDURE — 99214 OFFICE O/P EST MOD 30 MIN: CPT | Performed by: INTERNAL MEDICINE

## 2018-09-12 RX ORDER — ATORVASTATIN CALCIUM 80 MG/1
80 TABLET, FILM COATED ORAL DAILY
Qty: 90 TAB | Refills: 3 | Status: SHIPPED | OUTPATIENT
Start: 2018-09-12 | End: 2018-10-02 | Stop reason: SDUPTHER

## 2018-09-12 RX ORDER — METOPROLOL SUCCINATE 25 MG/1
25 TABLET, EXTENDED RELEASE ORAL 2 TIMES DAILY
Qty: 180 TAB | Refills: 3 | Status: ON HOLD | OUTPATIENT
Start: 2018-09-12 | End: 2018-10-29

## 2018-09-12 NOTE — PROGRESS NOTES
The patient and his wife came in today to review the patient's angiograms in reference to his angina pectoris and abnormal myocardial perfusion scan with a recent cardiac catheterization demonstrating multivessel coronary artery disease.  Spent 45 minutes in face-to-face patient contact in which greater than 50% of the visit was spent in in reviewing the patient's coronary angiogram images, discussing treatment options regarding either complex coronary intervention or coronary bypass grafting reviewing the potential risks and benefits of both approaches and optimizing his medical therapy by increasing atorvastatin to 80 mg daily and metoprolol to SR 25 mg twice daily.  It was decided to proceed with CABG for a revascularization strategy.  The patient indicates and is adamant about waiting until Thanksgiving of this year to undergo CABG.  He will contact Dr. Canela, cardiothoracic surgeon with his decision and arrange for CABG

## 2018-09-12 NOTE — TELEPHONE ENCOUNTER
Patient called back, transferred by .  Patient confirms he can be in clinic at 1600.    Shanda stevenson MD and  M.A. Notified.

## 2018-09-14 ENCOUNTER — TELEPHONE (OUTPATIENT)
Dept: CARDIOLOGY | Facility: MEDICAL CENTER | Age: 63
End: 2018-09-14

## 2018-09-14 DIAGNOSIS — R93.1 ABNORMAL NUCLEAR CARDIAC IMAGING TEST: ICD-10-CM

## 2018-09-14 DIAGNOSIS — I20.0 UNSTABLE ANGINA PECTORIS (HCC): ICD-10-CM

## 2018-09-14 DIAGNOSIS — I10 ESSENTIAL HYPERTENSION, BENIGN: ICD-10-CM

## 2018-09-14 DIAGNOSIS — I25.118 CORONARY ARTERY DISEASE OF NATIVE ARTERY OF NATIVE HEART WITH STABLE ANGINA PECTORIS (HCC): ICD-10-CM

## 2018-09-14 NOTE — TELEPHONE ENCOUNTER
Echo per the heart surgeons   Received: Today   Message Contents   Barby Arias, Med Ass't  FERCHO Heller,     I just recvd a call from the heart surgeons. This patient is scheduled for surgery in November. They are scheduled to see them back in the office on 10-30-18. They will need an echo before this visit. Can you please place that order so the schedules can schedule it? This is a [facility] patient.     Thank You,   Barby Tejeda ordered    Task to scheduling

## 2018-09-18 ENCOUNTER — HOSPITAL ENCOUNTER (OUTPATIENT)
Dept: RADIOLOGY | Facility: MEDICAL CENTER | Age: 63
End: 2018-09-18

## 2018-09-27 NOTE — TELEPHONE ENCOUNTER
Attempted to contact patient to remind to schedule echo. No answer. LVM with Imaging scheduling # ([phone])

## 2018-10-02 DIAGNOSIS — I10 ESSENTIAL HYPERTENSION, BENIGN: ICD-10-CM

## 2018-10-02 DIAGNOSIS — E78.5 DYSLIPIDEMIA: ICD-10-CM

## 2018-10-02 RX ORDER — ATORVASTATIN CALCIUM 80 MG/1
80 TABLET, FILM COATED ORAL DAILY
Qty: 90 TAB | Refills: 3 | Status: SHIPPED | OUTPATIENT
Start: 2018-10-02 | End: 2019-12-12 | Stop reason: SDUPTHER

## 2018-10-11 ENCOUNTER — HOSPITAL ENCOUNTER (OUTPATIENT)
Dept: LAB | Facility: MEDICAL CENTER | Age: 63
End: 2018-10-11
Attending: NURSE PRACTITIONER
Payer: COMMERCIAL

## 2018-10-11 PROCEDURE — 87640 STAPH A DNA AMP PROBE: CPT

## 2018-10-11 PROCEDURE — 87641 MR-STAPH DNA AMP PROBE: CPT

## 2018-10-12 LAB
SCCMEC + MECA PNL NOSE NAA+PROBE: NEGATIVE
SCCMEC + MECA PNL NOSE NAA+PROBE: POSITIVE

## 2018-10-23 ENCOUNTER — HOSPITAL ENCOUNTER (OUTPATIENT)
Dept: RADIOLOGY | Facility: MEDICAL CENTER | Age: 63
DRG: 235 | End: 2018-10-23
Attending: THORACIC SURGERY (CARDIOTHORACIC VASCULAR SURGERY) | Admitting: THORACIC SURGERY (CARDIOTHORACIC VASCULAR SURGERY)
Payer: COMMERCIAL

## 2018-10-23 ENCOUNTER — HOSPITAL ENCOUNTER (OUTPATIENT)
Dept: CARDIOLOGY | Facility: MEDICAL CENTER | Age: 63
DRG: 235 | End: 2018-10-23
Attending: THORACIC SURGERY (CARDIOTHORACIC VASCULAR SURGERY)
Payer: COMMERCIAL

## 2018-10-23 ENCOUNTER — HOSPITAL ENCOUNTER (OUTPATIENT)
Dept: RADIOLOGY | Facility: MEDICAL CENTER | Age: 63
DRG: 235 | End: 2018-10-23
Attending: THORACIC SURGERY (CARDIOTHORACIC VASCULAR SURGERY)
Payer: COMMERCIAL

## 2018-10-23 DIAGNOSIS — Z01.810 PRE-OPERATIVE CARDIOVASCULAR EXAMINATION: ICD-10-CM

## 2018-10-23 DIAGNOSIS — I25.119 CORONARY ARTERY DISEASE INVOLVING NATIVE CORONARY ARTERY OF NATIVE HEART WITH ANGINA PECTORIS (HCC): ICD-10-CM

## 2018-10-23 LAB
ABO GROUP BLD: NORMAL
ALBUMIN SERPL BCP-MCNC: 4.7 G/DL (ref 3.2–4.9)
ALBUMIN/GLOB SERPL: 2 G/DL
ALP SERPL-CCNC: 54 U/L (ref 30–99)
ALT SERPL-CCNC: 53 U/L (ref 2–50)
ANION GAP SERPL CALC-SCNC: 9 MMOL/L (ref 0–11.9)
APPEARANCE UR: CLEAR
APTT PPP: 26.8 SEC (ref 24.7–36)
AST SERPL-CCNC: 34 U/L (ref 12–45)
BASOPHILS # BLD AUTO: 0.6 % (ref 0–1.8)
BASOPHILS # BLD: 0.04 K/UL (ref 0–0.12)
BILIRUB SERPL-MCNC: 1.3 MG/DL (ref 0.1–1.5)
BILIRUB UR QL STRIP.AUTO: NEGATIVE
BLD GP AB SCN SERPL QL: NORMAL
BUN SERPL-MCNC: 22 MG/DL (ref 8–22)
CALCIUM SERPL-MCNC: 10 MG/DL (ref 8.5–10.5)
CHLORIDE SERPL-SCNC: 101 MMOL/L (ref 96–112)
CO2 SERPL-SCNC: 26 MMOL/L (ref 20–33)
COLOR UR: YELLOW
CREAT SERPL-MCNC: 0.84 MG/DL (ref 0.5–1.4)
EOSINOPHIL # BLD AUTO: 0.17 K/UL (ref 0–0.51)
EOSINOPHIL NFR BLD: 2.4 % (ref 0–6.9)
ERYTHROCYTE [DISTWIDTH] IN BLOOD BY AUTOMATED COUNT: 42.2 FL (ref 35.9–50)
EST. AVERAGE GLUCOSE BLD GHB EST-MCNC: 111 MG/DL
GLOBULIN SER CALC-MCNC: 2.3 G/DL (ref 1.9–3.5)
GLUCOSE SERPL-MCNC: 114 MG/DL (ref 65–99)
GLUCOSE UR STRIP.AUTO-MCNC: NEGATIVE MG/DL
HBA1C MFR BLD: 5.5 % (ref 0–5.6)
HCT VFR BLD AUTO: 44.2 % (ref 42–52)
HGB BLD-MCNC: 16.2 G/DL (ref 14–18)
IMM GRANULOCYTES # BLD AUTO: 0.02 K/UL (ref 0–0.11)
IMM GRANULOCYTES NFR BLD AUTO: 0.3 % (ref 0–0.9)
INR PPP: 1.08 (ref 0.87–1.13)
KETONES UR STRIP.AUTO-MCNC: NEGATIVE MG/DL
LEUKOCYTE ESTERASE UR QL STRIP.AUTO: NEGATIVE
LYMPHOCYTES # BLD AUTO: 1.98 K/UL (ref 1–4.8)
LYMPHOCYTES NFR BLD: 27.5 % (ref 22–41)
MCH RBC QN AUTO: 35.4 PG (ref 27–33)
MCHC RBC AUTO-ENTMCNC: 36.7 G/DL (ref 33.7–35.3)
MCV RBC AUTO: 96.7 FL (ref 81.4–97.8)
MICRO URNS: NORMAL
MONOCYTES # BLD AUTO: 0.71 K/UL (ref 0–0.85)
MONOCYTES NFR BLD AUTO: 9.9 % (ref 0–13.4)
NEUTROPHILS # BLD AUTO: 4.27 K/UL (ref 1.82–7.42)
NEUTROPHILS NFR BLD: 59.3 % (ref 44–72)
NITRITE UR QL STRIP.AUTO: NEGATIVE
NRBC # BLD AUTO: 0 K/UL
NRBC BLD-RTO: 0 /100 WBC
PH UR STRIP.AUTO: 6.5 [PH]
PLATELET # BLD AUTO: 192 K/UL (ref 164–446)
PMV BLD AUTO: 8.9 FL (ref 9–12.9)
POTASSIUM SERPL-SCNC: 3.8 MMOL/L (ref 3.6–5.5)
PROT SERPL-MCNC: 7 G/DL (ref 6–8.2)
PROT UR QL STRIP: NEGATIVE MG/DL
PROTHROMBIN TIME: 14.1 SEC (ref 12–14.6)
RBC # BLD AUTO: 4.57 M/UL (ref 4.7–6.1)
RBC UR QL AUTO: NEGATIVE
RH BLD: NORMAL
SODIUM SERPL-SCNC: 136 MMOL/L (ref 135–145)
SP GR UR STRIP.AUTO: 1.02
UROBILINOGEN UR STRIP.AUTO-MCNC: 0.2 MG/DL
WBC # BLD AUTO: 7.2 K/UL (ref 4.8–10.8)

## 2018-10-23 PROCEDURE — 85610 PROTHROMBIN TIME: CPT

## 2018-10-23 PROCEDURE — 93880 EXTRACRANIAL BILAT STUDY: CPT

## 2018-10-23 PROCEDURE — 86900 BLOOD TYPING SEROLOGIC ABO: CPT

## 2018-10-23 PROCEDURE — 83036 HEMOGLOBIN GLYCOSYLATED A1C: CPT

## 2018-10-23 PROCEDURE — 93880 EXTRACRANIAL BILAT STUDY: CPT | Mod: 26 | Performed by: INTERNAL MEDICINE

## 2018-10-23 PROCEDURE — 85730 THROMBOPLASTIN TIME PARTIAL: CPT

## 2018-10-23 PROCEDURE — 93970 EXTREMITY STUDY: CPT | Mod: 26 | Performed by: INTERNAL MEDICINE

## 2018-10-23 PROCEDURE — 86901 BLOOD TYPING SEROLOGIC RH(D): CPT

## 2018-10-23 PROCEDURE — 86850 RBC ANTIBODY SCREEN: CPT

## 2018-10-23 PROCEDURE — 93970 EXTREMITY STUDY: CPT

## 2018-10-23 PROCEDURE — 85025 COMPLETE CBC W/AUTO DIFF WBC: CPT

## 2018-10-23 PROCEDURE — 93005 ELECTROCARDIOGRAM TRACING: CPT | Performed by: THORACIC SURGERY (CARDIOTHORACIC VASCULAR SURGERY)

## 2018-10-23 PROCEDURE — 93010 ELECTROCARDIOGRAM REPORT: CPT | Performed by: INTERNAL MEDICINE

## 2018-10-23 PROCEDURE — 80053 COMPREHEN METABOLIC PANEL: CPT

## 2018-10-23 PROCEDURE — 71046 X-RAY EXAM CHEST 2 VIEWS: CPT

## 2018-10-23 PROCEDURE — 81003 URINALYSIS AUTO W/O SCOPE: CPT

## 2018-10-23 RX ORDER — CEFAZOLIN SODIUM 2 G/100ML
2 INJECTION, SOLUTION INTRAVENOUS ONCE
Status: DISCONTINUED | OUTPATIENT
Start: 2018-10-24 | End: 2018-10-24

## 2018-10-23 RX ORDER — DEXMEDETOMIDINE HYDROCHLORIDE 4 UG/ML
0-1.5 INJECTION, SOLUTION INTRAVENOUS CONTINUOUS
Status: DISCONTINUED | OUTPATIENT
Start: 2018-10-24 | End: 2018-10-24

## 2018-10-23 RX ORDER — ALPRAZOLAM 0.25 MG/1
0.25 TABLET ORAL EVERY 6 HOURS PRN
Status: DISCONTINUED | OUTPATIENT
Start: 2018-10-23 | End: 2018-10-24

## 2018-10-23 NOTE — CARE PLAN
Problem: Pre Op  Goal: Optimal preparation for CABG/Heart Valve surgery  Outcome: PROGRESSING AS EXPECTED  Problem: Pre Op  Goal: Optimal preparation for CABG/Heart Valve surgery  Intervention: Pre Op education to patient. Provide patient [facility] Patient Guideline for Cardiac Surgery (See Pt. Ed.)  Discussed anatomy and physiology of cardiac surgery with patient to include pre-op regimen. Reviewed post-op expectations to include the use of incentive spirometry with return demonstration, ventilator management, cardiac monitoring, tubes and drains, early ambulation, and expected length of stay. Also provided information on Cardiac Rehab and how to schedule an appointment. Patient state full understanding of all information given.    Intervention: Baseline assessment documented to include IS volume, weight, bilateral BP and peripheral pulses.  Baseline IS: 3500    Intervention: NPO at midnight except cardiac medications. (No ASA, coumadin or Plavix)  Instructed patient nothing to eat or drink after midnight the night prior to scheduled surgery date.    Intervention: Shower with chlorhexidine x 2  Instructed patient to wash entire body with chlorhexedine wipes prior to bedtime the night before surgery.

## 2018-10-24 ENCOUNTER — APPOINTMENT (OUTPATIENT)
Dept: CARDIOLOGY | Facility: MEDICAL CENTER | Age: 63
DRG: 235 | End: 2018-10-24
Attending: THORACIC SURGERY (CARDIOTHORACIC VASCULAR SURGERY)
Payer: COMMERCIAL

## 2018-10-24 ENCOUNTER — APPOINTMENT (OUTPATIENT)
Dept: RADIOLOGY | Facility: MEDICAL CENTER | Age: 63
DRG: 235 | End: 2018-10-24
Attending: THORACIC SURGERY (CARDIOTHORACIC VASCULAR SURGERY)
Payer: COMMERCIAL

## 2018-10-24 ENCOUNTER — HOSPITAL ENCOUNTER (INPATIENT)
Facility: MEDICAL CENTER | Age: 63
LOS: 5 days | DRG: 235 | End: 2018-10-29
Attending: THORACIC SURGERY (CARDIOTHORACIC VASCULAR SURGERY) | Admitting: THORACIC SURGERY (CARDIOTHORACIC VASCULAR SURGERY)
Payer: COMMERCIAL

## 2018-10-24 DIAGNOSIS — G89.18 ACUTE POST-OPERATIVE PAIN: ICD-10-CM

## 2018-10-24 DIAGNOSIS — Z95.1 S/P CABG X 3: ICD-10-CM

## 2018-10-24 LAB
ABO GROUP BLD: NORMAL
ACT BLD: 114 SEC (ref 74–137)
ACT BLD: 125 SEC (ref 74–137)
ACT BLD: 450 SEC (ref 74–137)
ACT BLD: 466 SEC (ref 74–137)
ACT BLD: 582 SEC (ref 74–137)
ACT BLD: 747 SEC (ref 74–137)
ACT BLD: >1000 SEC (ref 74–137)
ACTION RANGE TRIGGERED IACRT: NO
ACTION RANGE TRIGGERED IACRT: YES
ACTION RANGE TRIGGERED IACRT: YES
APTT PPP: 28.8 SEC (ref 24.7–36)
BASE EXCESS BLDA CALC-SCNC: -4 MMOL/L (ref -4–3)
BASE EXCESS BLDA CALC-SCNC: -5 MMOL/L (ref -4–3)
BASE EXCESS BLDA CALC-SCNC: -6 MMOL/L (ref -4–3)
BASE EXCESS BLDA CALC-SCNC: -6 MMOL/L (ref -4–3)
BASE EXCESS BLDA CALC-SCNC: -8 MMOL/L (ref -4–3)
BASE EXCESS BLDA CALC-SCNC: -8 MMOL/L (ref -4–3)
BODY TEMPERATURE: ABNORMAL DEGREES
CA-I BLD ISE-SCNC: 1.02 MMOL/L (ref 1.1–1.3)
CO2 BLDA-SCNC: 19 MMOL/L (ref 20–33)
CO2 BLDA-SCNC: 19 MMOL/L (ref 20–33)
CO2 BLDA-SCNC: 20 MMOL/L (ref 20–33)
CO2 BLDA-SCNC: 21 MMOL/L (ref 20–33)
CO2 BLDA-SCNC: 21 MMOL/L (ref 20–33)
CO2 BLDA-SCNC: 22 MMOL/L (ref 20–33)
EKG IMPRESSION: NORMAL
EKG IMPRESSION: NORMAL
GLUCOSE BLD-MCNC: 108 MG/DL (ref 65–99)
GLUCOSE BLD-MCNC: 161 MG/DL (ref 65–99)
GLUCOSE BLD-MCNC: 170 MG/DL (ref 65–99)
GLUCOSE BLD-MCNC: 176 MG/DL (ref 65–99)
GLUCOSE BLD-MCNC: 179 MG/DL (ref 65–99)
HCO3 BLDA-SCNC: 18 MMOL/L (ref 17–25)
HCO3 BLDA-SCNC: 18.2 MMOL/L (ref 17–25)
HCO3 BLDA-SCNC: 18.8 MMOL/L (ref 17–25)
HCO3 BLDA-SCNC: 19.7 MMOL/L (ref 17–25)
HCO3 BLDA-SCNC: 20 MMOL/L (ref 17–25)
HCO3 BLDA-SCNC: 21.1 MMOL/L (ref 17–25)
HCT VFR BLD CALC: 32 % (ref 42–52)
HGB BLD-MCNC: 10.9 G/DL (ref 14–18)
HOROWITZ INDEX BLDA+IHG-RTO: 175 MM[HG]
HOROWITZ INDEX BLDA+IHG-RTO: 206 MM[HG]
HOROWITZ INDEX BLDA+IHG-RTO: 277 MM[HG]
HOROWITZ INDEX BLDA+IHG-RTO: 277 MM[HG]
HOROWITZ INDEX BLDA+IHG-RTO: 297 MM[HG]
HOROWITZ INDEX BLDA+IHG-RTO: 90 MM[HG]
INR PPP: 1.64 (ref 0.87–1.13)
INST. QUALIFIED PATIENT IIQPT: YES
MAGNESIUM SERPL-MCNC: 1.8 MG/DL (ref 1.5–2.5)
O2/TOTAL GAS SETTING VFR VENT: 100 %
O2/TOTAL GAS SETTING VFR VENT: 30 %
O2/TOTAL GAS SETTING VFR VENT: 50 %
O2/TOTAL GAS SETTING VFR VENT: 80 %
PCO2 BLDA: 35.5 MMHG (ref 26–37)
PCO2 BLDA: 35.6 MMHG (ref 26–37)
PCO2 BLDA: 38.3 MMHG (ref 26–37)
PCO2 BLDA: 38.6 MMHG (ref 26–37)
PCO2 BLDA: 38.6 MMHG (ref 26–37)
PCO2 BLDA: 39.3 MMHG (ref 26–37)
PCO2 TEMP ADJ BLDA: 34.6 MMHG (ref 26–37)
PCO2 TEMP ADJ BLDA: 35.3 MMHG (ref 26–37)
PCO2 TEMP ADJ BLDA: 38 MMHG (ref 26–37)
PCO2 TEMP ADJ BLDA: 38.1 MMHG (ref 26–37)
PCO2 TEMP ADJ BLDA: 38.2 MMHG (ref 26–37)
PCO2 TEMP ADJ BLDA: 38.3 MMHG (ref 26–37)
PH BLDA: 7.28 [PH] (ref 7.4–7.5)
PH BLDA: 7.29 [PH] (ref 7.4–7.5)
PH BLDA: 7.31 [PH] (ref 7.4–7.5)
PH BLDA: 7.33 [PH] (ref 7.4–7.5)
PH BLDA: 7.34 [PH] (ref 7.4–7.5)
PH BLDA: 7.36 [PH] (ref 7.4–7.5)
PH TEMP ADJ BLDA: 7.28 [PH] (ref 7.4–7.5)
PH TEMP ADJ BLDA: 7.29 [PH] (ref 7.4–7.5)
PH TEMP ADJ BLDA: 7.32 [PH] (ref 7.4–7.5)
PH TEMP ADJ BLDA: 7.33 [PH] (ref 7.4–7.5)
PH TEMP ADJ BLDA: 7.35 [PH] (ref 7.4–7.5)
PH TEMP ADJ BLDA: 7.37 [PH] (ref 7.4–7.5)
PLATELET # BLD AUTO: 141 K/UL (ref 164–446)
PO2 BLDA: 103 MMHG (ref 64–87)
PO2 BLDA: 140 MMHG (ref 64–87)
PO2 BLDA: 83 MMHG (ref 64–87)
PO2 BLDA: 83 MMHG (ref 64–87)
PO2 BLDA: 89 MMHG (ref 64–87)
PO2 BLDA: 90 MMHG (ref 64–87)
PO2 TEMP ADJ BLDA: 137 MMHG (ref 64–87)
PO2 TEMP ADJ BLDA: 82 MMHG (ref 64–87)
PO2 TEMP ADJ BLDA: 82 MMHG (ref 64–87)
PO2 TEMP ADJ BLDA: 88 MMHG (ref 64–87)
PO2 TEMP ADJ BLDA: 88 MMHG (ref 64–87)
PO2 TEMP ADJ BLDA: 99 MMHG (ref 64–87)
POTASSIUM BLD-SCNC: 3.6 MMOL/L (ref 3.6–5.5)
POTASSIUM SERPL-SCNC: 3.6 MMOL/L (ref 3.6–5.5)
PROTHROMBIN TIME: 19.5 SEC (ref 12–14.6)
RH BLD: NORMAL
SAO2 % BLDA: 95 % (ref 93–99)
SAO2 % BLDA: 95 % (ref 93–99)
SAO2 % BLDA: 96 % (ref 93–99)
SAO2 % BLDA: 97 % (ref 93–99)
SAO2 % BLDA: 97 % (ref 93–99)
SAO2 % BLDA: 99 % (ref 93–99)
SODIUM BLD-SCNC: 138 MMOL/L (ref 135–145)
SPECIMEN DRAWN FROM PATIENT: ABNORMAL

## 2018-10-24 PROCEDURE — 85049 AUTOMATED PLATELET COUNT: CPT

## 2018-10-24 PROCEDURE — 5A1223Z PERFORMANCE OF CARDIAC PACING, CONTINUOUS: ICD-10-PCS | Performed by: THORACIC SURGERY (CARDIOTHORACIC VASCULAR SURGERY)

## 2018-10-24 PROCEDURE — 94002 VENT MGMT INPAT INIT DAY: CPT

## 2018-10-24 PROCEDURE — C9248 INJ, CLEVIDIPINE BUTYRATE: HCPCS

## 2018-10-24 PROCEDURE — 82330 ASSAY OF CALCIUM: CPT | Mod: 91

## 2018-10-24 PROCEDURE — 501519 HCHG SUTURE, E PACK: Performed by: THORACIC SURGERY (CARDIOTHORACIC VASCULAR SURGERY)

## 2018-10-24 PROCEDURE — 85730 THROMBOPLASTIN TIME PARTIAL: CPT

## 2018-10-24 PROCEDURE — 700101 HCHG RX REV CODE 250

## 2018-10-24 PROCEDURE — 700102 HCHG RX REV CODE 250 W/ 637 OVERRIDE(OP): Performed by: CLINICAL NURSE SPECIALIST

## 2018-10-24 PROCEDURE — 700105 HCHG RX REV CODE 258: Performed by: CLINICAL NURSE SPECIALIST

## 2018-10-24 PROCEDURE — C1729 CATH, DRAINAGE: HCPCS | Performed by: THORACIC SURGERY (CARDIOTHORACIC VASCULAR SURGERY)

## 2018-10-24 PROCEDURE — 700105 HCHG RX REV CODE 258: Performed by: THORACIC SURGERY (CARDIOTHORACIC VASCULAR SURGERY)

## 2018-10-24 PROCEDURE — 700102 HCHG RX REV CODE 250 W/ 637 OVERRIDE(OP): Performed by: THORACIC SURGERY (CARDIOTHORACIC VASCULAR SURGERY)

## 2018-10-24 PROCEDURE — 94150 VITAL CAPACITY TEST: CPT

## 2018-10-24 PROCEDURE — 021009W BYPASS CORONARY ARTERY, ONE ARTERY FROM AORTA WITH AUTOLOGOUS VENOUS TISSUE, OPEN APPROACH: ICD-10-PCS | Performed by: THORACIC SURGERY (CARDIOTHORACIC VASCULAR SURGERY)

## 2018-10-24 PROCEDURE — 700105 HCHG RX REV CODE 258

## 2018-10-24 PROCEDURE — 500896 HCHG PACK, VASCULAR (FOR ROOM 10): Performed by: THORACIC SURGERY (CARDIOTHORACIC VASCULAR SURGERY)

## 2018-10-24 PROCEDURE — 110371 HCHG SHELL REV 272: Performed by: THORACIC SURGERY (CARDIOTHORACIC VASCULAR SURGERY)

## 2018-10-24 PROCEDURE — 160048 HCHG OR STATISTICAL LEVEL 1-5: Performed by: THORACIC SURGERY (CARDIOTHORACIC VASCULAR SURGERY)

## 2018-10-24 PROCEDURE — 82803 BLOOD GASES ANY COMBINATION: CPT | Mod: 91

## 2018-10-24 PROCEDURE — 500312 HCHG CONNECTOR, BLAKE DRAIN 1-WAY: Performed by: THORACIC SURGERY (CARDIOTHORACIC VASCULAR SURGERY)

## 2018-10-24 PROCEDURE — 02100Z8 BYPASS CORONARY ARTERY, ONE ARTERY FROM RIGHT INTERNAL MAMMARY, OPEN APPROACH: ICD-10-PCS | Performed by: THORACIC SURGERY (CARDIOTHORACIC VASCULAR SURGERY)

## 2018-10-24 PROCEDURE — 71045 X-RAY EXAM CHEST 1 VIEW: CPT

## 2018-10-24 PROCEDURE — 770022 HCHG ROOM/CARE - ICU (200)

## 2018-10-24 PROCEDURE — C1751 CATH, INF, PER/CENT/MIDLINE: HCPCS | Performed by: THORACIC SURGERY (CARDIOTHORACIC VASCULAR SURGERY)

## 2018-10-24 PROCEDURE — 500767 HCHG KIT, ENDOSCOPIC VEIN HARVEST: Performed by: THORACIC SURGERY (CARDIOTHORACIC VASCULAR SURGERY)

## 2018-10-24 PROCEDURE — 700101 HCHG RX REV CODE 250: Performed by: THORACIC SURGERY (CARDIOTHORACIC VASCULAR SURGERY)

## 2018-10-24 PROCEDURE — 700111 HCHG RX REV CODE 636 W/ 250 OVERRIDE (IP)

## 2018-10-24 PROCEDURE — 160042 HCHG SURGERY MINUTES - EA ADDL 1 MIN LEVEL 5: Performed by: THORACIC SURGERY (CARDIOTHORACIC VASCULAR SURGERY)

## 2018-10-24 PROCEDURE — 700111 HCHG RX REV CODE 636 W/ 250 OVERRIDE (IP): Performed by: NURSE PRACTITIONER

## 2018-10-24 PROCEDURE — 500734 HCHG INSERT, STEALTH: Performed by: THORACIC SURGERY (CARDIOTHORACIC VASCULAR SURGERY)

## 2018-10-24 PROCEDURE — 502240 HCHG MISC OR SUPPLY RC 0272: Performed by: THORACIC SURGERY (CARDIOTHORACIC VASCULAR SURGERY)

## 2018-10-24 PROCEDURE — 501745 HCHG WIRE, SURGICAL STEEL: Performed by: THORACIC SURGERY (CARDIOTHORACIC VASCULAR SURGERY)

## 2018-10-24 PROCEDURE — 84295 ASSAY OF SERUM SODIUM: CPT | Mod: 91

## 2018-10-24 PROCEDURE — A6402 STERILE GAUZE <= 16 SQ IN: HCPCS | Performed by: THORACIC SURGERY (CARDIOTHORACIC VASCULAR SURGERY)

## 2018-10-24 PROCEDURE — 83735 ASSAY OF MAGNESIUM: CPT

## 2018-10-24 PROCEDURE — B24BZZ4 ULTRASONOGRAPHY OF HEART WITH AORTA, TRANSESOPHAGEAL: ICD-10-PCS | Performed by: THORACIC SURGERY (CARDIOTHORACIC VASCULAR SURGERY)

## 2018-10-24 PROCEDURE — 06BP4ZZ EXCISION OF RIGHT SAPHENOUS VEIN, PERCUTANEOUS ENDOSCOPIC APPROACH: ICD-10-PCS | Performed by: THORACIC SURGERY (CARDIOTHORACIC VASCULAR SURGERY)

## 2018-10-24 PROCEDURE — 700101 HCHG RX REV CODE 250: Performed by: CLINICAL NURSE SPECIALIST

## 2018-10-24 PROCEDURE — 93005 ELECTROCARDIOGRAM TRACING: CPT | Performed by: CLINICAL NURSE SPECIALIST

## 2018-10-24 PROCEDURE — 700105 HCHG RX REV CODE 258: Performed by: INTERNAL MEDICINE

## 2018-10-24 PROCEDURE — 5A1221Z PERFORMANCE OF CARDIAC OUTPUT, CONTINUOUS: ICD-10-PCS | Performed by: THORACIC SURGERY (CARDIOTHORACIC VASCULAR SURGERY)

## 2018-10-24 PROCEDURE — 700111 HCHG RX REV CODE 636 W/ 250 OVERRIDE (IP): Performed by: THORACIC SURGERY (CARDIOTHORACIC VASCULAR SURGERY)

## 2018-10-24 PROCEDURE — 99291 CRITICAL CARE FIRST HOUR: CPT | Performed by: INTERNAL MEDICINE

## 2018-10-24 PROCEDURE — 700111 HCHG RX REV CODE 636 W/ 250 OVERRIDE (IP): Performed by: CLINICAL NURSE SPECIALIST

## 2018-10-24 PROCEDURE — 84132 ASSAY OF SERUM POTASSIUM: CPT | Mod: 91

## 2018-10-24 PROCEDURE — 500371 HCHG DRAIN, BLAKE 10MM: Performed by: THORACIC SURGERY (CARDIOTHORACIC VASCULAR SURGERY)

## 2018-10-24 PROCEDURE — 700111 HCHG RX REV CODE 636 W/ 250 OVERRIDE (IP): Performed by: INTERNAL MEDICINE

## 2018-10-24 PROCEDURE — C1725 CATH, TRANSLUMIN NON-LASER: HCPCS | Performed by: THORACIC SURGERY (CARDIOTHORACIC VASCULAR SURGERY)

## 2018-10-24 PROCEDURE — 503000 HCHG SUTURE, OHS: Performed by: THORACIC SURGERY (CARDIOTHORACIC VASCULAR SURGERY)

## 2018-10-24 PROCEDURE — 02100Z9 BYPASS CORONARY ARTERY, ONE ARTERY FROM LEFT INTERNAL MAMMARY, OPEN APPROACH: ICD-10-PCS | Performed by: THORACIC SURGERY (CARDIOTHORACIC VASCULAR SURGERY)

## 2018-10-24 PROCEDURE — 500002 HCHG ADHESIVE, DERMABOND: Performed by: THORACIC SURGERY (CARDIOTHORACIC VASCULAR SURGERY)

## 2018-10-24 PROCEDURE — 160009 HCHG ANES TIME/MIN: Performed by: THORACIC SURGERY (CARDIOTHORACIC VASCULAR SURGERY)

## 2018-10-24 PROCEDURE — A9270 NON-COVERED ITEM OR SERVICE: HCPCS | Performed by: THORACIC SURGERY (CARDIOTHORACIC VASCULAR SURGERY)

## 2018-10-24 PROCEDURE — 85610 PROTHROMBIN TIME: CPT

## 2018-10-24 PROCEDURE — 93010 ELECTROCARDIOGRAM REPORT: CPT | Performed by: INTERNAL MEDICINE

## 2018-10-24 PROCEDURE — C1894 INTRO/SHEATH, NON-LASER: HCPCS | Performed by: THORACIC SURGERY (CARDIOTHORACIC VASCULAR SURGERY)

## 2018-10-24 PROCEDURE — 503001 HCHG PERFUSION: Performed by: THORACIC SURGERY (CARDIOTHORACIC VASCULAR SURGERY)

## 2018-10-24 PROCEDURE — 93325 DOPPLER ECHO COLOR FLOW MAPG: CPT

## 2018-10-24 PROCEDURE — 82947 ASSAY GLUCOSE BLOOD QUANT: CPT

## 2018-10-24 PROCEDURE — 500890 HCHG PACK, OPEN HEART: Performed by: THORACIC SURGERY (CARDIOTHORACIC VASCULAR SURGERY)

## 2018-10-24 PROCEDURE — 160031 HCHG SURGERY MINUTES - 1ST 30 MINS LEVEL 5: Performed by: THORACIC SURGERY (CARDIOTHORACIC VASCULAR SURGERY)

## 2018-10-24 PROCEDURE — 85014 HEMATOCRIT: CPT

## 2018-10-24 PROCEDURE — 500385 HCHG DRAIN, PLEUROVAC ADUL: Performed by: THORACIC SURGERY (CARDIOTHORACIC VASCULAR SURGERY)

## 2018-10-24 PROCEDURE — 85347 COAGULATION TIME ACTIVATED: CPT | Mod: 91

## 2018-10-24 PROCEDURE — A9270 NON-COVERED ITEM OR SERVICE: HCPCS | Performed by: CLINICAL NURSE SPECIALIST

## 2018-10-24 PROCEDURE — 82962 GLUCOSE BLOOD TEST: CPT | Mod: 91

## 2018-10-24 DEVICE — GLUE BIOGLUE 5CC PRE-FILL (5EA/PK - 4 TIPS PER SYRINGE): Type: IMPLANTABLE DEVICE | Status: FUNCTIONAL

## 2018-10-24 RX ORDER — LIDOCAINE HYDROCHLORIDE 10 MG/ML
INJECTION, SOLUTION INFILTRATION; PERINEURAL
Status: COMPLETED
Start: 2018-10-24 | End: 2018-10-24

## 2018-10-24 RX ORDER — ACETAMINOPHEN 325 MG/1
650 TABLET ORAL EVERY 4 HOURS PRN
Status: DISCONTINUED | OUTPATIENT
Start: 2018-10-24 | End: 2018-10-29 | Stop reason: HOSPADM

## 2018-10-24 RX ORDER — ATORVASTATIN CALCIUM 20 MG/1
80 TABLET, FILM COATED ORAL DAILY
Status: DISCONTINUED | OUTPATIENT
Start: 2018-10-24 | End: 2018-10-29 | Stop reason: HOSPADM

## 2018-10-24 RX ORDER — BISACODYL 10 MG
10 SUPPOSITORY, RECTAL RECTAL
Status: DISCONTINUED | OUTPATIENT
Start: 2018-10-24 | End: 2018-10-29 | Stop reason: HOSPADM

## 2018-10-24 RX ORDER — POTASSIUM CHLORIDE 14.9 MG/ML
20 INJECTION INTRAVENOUS ONCE
Status: COMPLETED | OUTPATIENT
Start: 2018-10-24 | End: 2018-10-24

## 2018-10-24 RX ORDER — ADENOSINE 3 MG/ML
INJECTION, SOLUTION INTRAVENOUS
Status: DISCONTINUED
Start: 2018-10-24 | End: 2018-10-24

## 2018-10-24 RX ORDER — SODIUM CHLORIDE, SODIUM GLUCONATE, SODIUM ACETATE, POTASSIUM CHLORIDE AND MAGNESIUM CHLORIDE 526; 502; 368; 37; 30 MG/100ML; MG/100ML; MG/100ML; MG/100ML; MG/100ML
INJECTION, SOLUTION INTRAVENOUS PRN
Status: DISCONTINUED | OUTPATIENT
Start: 2018-10-24 | End: 2018-10-27

## 2018-10-24 RX ORDER — DIPHENHYDRAMINE HCL 25 MG
25 TABLET ORAL
Status: DISCONTINUED | OUTPATIENT
Start: 2018-10-24 | End: 2018-10-29 | Stop reason: HOSPADM

## 2018-10-24 RX ORDER — OXYCODONE HYDROCHLORIDE 10 MG/1
10 TABLET ORAL
Status: DISCONTINUED | OUTPATIENT
Start: 2018-10-24 | End: 2018-10-29 | Stop reason: HOSPADM

## 2018-10-24 RX ORDER — OXYCODONE HYDROCHLORIDE 5 MG/1
5 TABLET ORAL
Status: DISCONTINUED | OUTPATIENT
Start: 2018-10-24 | End: 2018-10-29 | Stop reason: HOSPADM

## 2018-10-24 RX ORDER — M-VIT,TX,IRON,MINS/CALC/FOLIC 27MG-0.4MG
1 TABLET ORAL DAILY
Status: ON HOLD | COMMUNITY
End: 2022-07-01

## 2018-10-24 RX ORDER — MIDAZOLAM HYDROCHLORIDE 1 MG/ML
2 INJECTION INTRAMUSCULAR; INTRAVENOUS
Status: DISCONTINUED | OUTPATIENT
Start: 2018-10-24 | End: 2018-10-24

## 2018-10-24 RX ORDER — AMOXICILLIN 250 MG
2 CAPSULE ORAL 2 TIMES DAILY
Status: DISCONTINUED | OUTPATIENT
Start: 2018-10-24 | End: 2018-10-29 | Stop reason: HOSPADM

## 2018-10-24 RX ORDER — DEXTROSE MONOHYDRATE 25 G/50ML
25 INJECTION, SOLUTION INTRAVENOUS PRN
Status: ACTIVE | OUTPATIENT
Start: 2018-10-24 | End: 2018-10-25

## 2018-10-24 RX ORDER — MORPHINE SULFATE 10 MG/ML
4 INJECTION, SOLUTION INTRAMUSCULAR; INTRAVENOUS
Status: DISCONTINUED | OUTPATIENT
Start: 2018-10-24 | End: 2018-10-24

## 2018-10-24 RX ORDER — ONDANSETRON 2 MG/ML
4 INJECTION INTRAMUSCULAR; INTRAVENOUS EVERY 6 HOURS PRN
Status: DISCONTINUED | OUTPATIENT
Start: 2018-10-24 | End: 2018-10-29 | Stop reason: HOSPADM

## 2018-10-24 RX ORDER — ACETAMINOPHEN 650 MG/1
650 SUPPOSITORY RECTAL EVERY 4 HOURS PRN
Status: DISCONTINUED | OUTPATIENT
Start: 2018-10-24 | End: 2018-10-29 | Stop reason: HOSPADM

## 2018-10-24 RX ORDER — POTASSIUM CHLORIDE 7.45 MG/ML
10 INJECTION INTRAVENOUS 2 TIMES DAILY
Status: DISCONTINUED | OUTPATIENT
Start: 2018-10-24 | End: 2018-10-27

## 2018-10-24 RX ORDER — AMLODIPINE BESYLATE 5 MG/1
5 TABLET ORAL DAILY
COMMUNITY
End: 2019-01-04

## 2018-10-24 RX ORDER — CYCLOBENZAPRINE HCL 10 MG
5 TABLET ORAL 3 TIMES DAILY PRN
Status: DISCONTINUED | OUTPATIENT
Start: 2018-10-24 | End: 2018-10-29 | Stop reason: HOSPADM

## 2018-10-24 RX ORDER — POLYETHYLENE GLYCOL 3350 17 G/17G
1 POWDER, FOR SOLUTION ORAL
Status: DISCONTINUED | OUTPATIENT
Start: 2018-10-24 | End: 2018-10-29 | Stop reason: HOSPADM

## 2018-10-24 RX ORDER — MAGNESIUM HYDROXIDE 1200 MG/15ML
LIQUID ORAL
Status: COMPLETED | OUTPATIENT
Start: 2018-10-24 | End: 2018-10-24

## 2018-10-24 RX ORDER — SODIUM CHLORIDE 9 MG/ML
INJECTION, SOLUTION INTRAVENOUS CONTINUOUS
Status: DISCONTINUED | OUTPATIENT
Start: 2018-10-24 | End: 2018-10-29 | Stop reason: HOSPADM

## 2018-10-24 RX ORDER — CLOPIDOGREL BISULFATE 75 MG/1
75 TABLET ORAL DAILY
Status: DISCONTINUED | OUTPATIENT
Start: 2018-10-25 | End: 2018-10-29 | Stop reason: HOSPADM

## 2018-10-24 RX ORDER — PAPAVERINE HYDROCHLORIDE 30 MG/ML
INJECTION INTRAMUSCULAR; INTRAVENOUS
Status: DISCONTINUED | OUTPATIENT
Start: 2018-10-24 | End: 2018-10-24 | Stop reason: HOSPADM

## 2018-10-24 RX ORDER — MAGNESIUM SULFATE 1 G/100ML
1 INJECTION INTRAVENOUS
Status: COMPLETED | OUTPATIENT
Start: 2018-10-24 | End: 2018-10-26

## 2018-10-24 RX ORDER — GABAPENTIN 100 MG/1
100 CAPSULE ORAL 3 TIMES DAILY
Status: DISCONTINUED | OUTPATIENT
Start: 2018-10-24 | End: 2018-10-29 | Stop reason: HOSPADM

## 2018-10-24 RX ORDER — NITROGLYCERIN 20 MG/100ML
0-100 INJECTION INTRAVENOUS CONTINUOUS
Status: DISCONTINUED | OUTPATIENT
Start: 2018-10-24 | End: 2018-10-27

## 2018-10-24 RX ORDER — TRAMADOL HYDROCHLORIDE 50 MG/1
50 TABLET ORAL EVERY 4 HOURS PRN
Status: DISCONTINUED | OUTPATIENT
Start: 2018-10-24 | End: 2018-10-29 | Stop reason: HOSPADM

## 2018-10-24 RX ORDER — POTASSIUM CHLORIDE 20 MEQ/1
10 TABLET, EXTENDED RELEASE ORAL 2 TIMES DAILY
Status: DISCONTINUED | OUTPATIENT
Start: 2018-10-24 | End: 2018-10-28

## 2018-10-24 RX ORDER — SODIUM CHLORIDE 9 MG/ML
INJECTION, SOLUTION INTRAVENOUS
Status: DISCONTINUED
Start: 2018-10-24 | End: 2018-10-24

## 2018-10-24 RX ORDER — ALUMINA, MAGNESIA, AND SIMETHICONE 2400; 2400; 240 MG/30ML; MG/30ML; MG/30ML
30 SUSPENSION ORAL EVERY 4 HOURS PRN
Status: DISCONTINUED | OUTPATIENT
Start: 2018-10-24 | End: 2018-10-29 | Stop reason: HOSPADM

## 2018-10-24 RX ORDER — DEXMEDETOMIDINE HYDROCHLORIDE 4 UG/ML
0-1.5 INJECTION, SOLUTION INTRAVENOUS CONTINUOUS
Status: DISCONTINUED | OUTPATIENT
Start: 2018-10-24 | End: 2018-10-27

## 2018-10-24 RX ORDER — PROMETHAZINE HYDROCHLORIDE 25 MG/1
25 SUPPOSITORY RECTAL EVERY 6 HOURS PRN
Status: DISCONTINUED | OUTPATIENT
Start: 2018-10-24 | End: 2018-10-29 | Stop reason: HOSPADM

## 2018-10-24 RX ADMIN — MAGNESIUM SULFATE 1 G: 1 INJECTION INTRAVENOUS at 14:14

## 2018-10-24 RX ADMIN — MORPHINE SULFATE 4 MG: 10 INJECTION INTRAVENOUS at 14:09

## 2018-10-24 RX ADMIN — LIDOCAINE HYDROCHLORIDE 0.5 ML: 10 INJECTION, SOLUTION INFILTRATION; PERINEURAL at 06:12

## 2018-10-24 RX ADMIN — SODIUM ACETATE 50 MEQ: 3.28 INJECTION, SOLUTION, CONCENTRATE INTRAVENOUS at 18:10

## 2018-10-24 RX ADMIN — INSULIN HUMAN 4 UNITS: 100 INJECTION, SOLUTION PARENTERAL at 13:53

## 2018-10-24 RX ADMIN — CYCLOBENZAPRINE 5 MG: 10 TABLET, FILM COATED ORAL at 19:24

## 2018-10-24 RX ADMIN — OXYCODONE HYDROCHLORIDE 10 MG: 10 TABLET ORAL at 22:21

## 2018-10-24 RX ADMIN — Medication 0.5 ML: at 06:12

## 2018-10-24 RX ADMIN — SODIUM CHLORIDE, SODIUM GLUCONATE, SODIUM ACETATE, POTASSIUM CHLORIDE AND MAGNESIUM CHLORIDE: 526; 502; 368; 37; 30 INJECTION, SOLUTION INTRAVENOUS at 14:12

## 2018-10-24 RX ADMIN — MUPIROCIN 1 APPLICATION: 20 OINTMENT TOPICAL at 19:26

## 2018-10-24 RX ADMIN — VANCOMYCIN HYDROCHLORIDE 1500 MG: 100 INJECTION, POWDER, LYOPHILIZED, FOR SOLUTION INTRAVENOUS at 23:47

## 2018-10-24 RX ADMIN — SODIUM CHLORIDE: 9 INJECTION, SOLUTION INTRAVENOUS at 13:00

## 2018-10-24 RX ADMIN — POTASSIUM CHLORIDE 20 MEQ: 14.9 INJECTION, SOLUTION INTRAVENOUS at 13:56

## 2018-10-24 RX ADMIN — OXYCODONE HYDROCHLORIDE 5 MG: 5 TABLET ORAL at 19:26

## 2018-10-24 RX ADMIN — CALCIUM CHLORIDE 1 G: 100 INJECTION, SOLUTION INTRAVENOUS at 14:36

## 2018-10-24 RX ADMIN — SODIUM CHLORIDE 2 UNITS/HR: 9 INJECTION, SOLUTION INTRAVENOUS at 13:45

## 2018-10-24 RX ADMIN — GABAPENTIN 100 MG: 100 CAPSULE ORAL at 19:24

## 2018-10-24 RX ADMIN — SODIUM CHLORIDE, SODIUM GLUCONATE, SODIUM ACETATE, POTASSIUM CHLORIDE AND MAGNESIUM CHLORIDE: 526; 502; 368; 37; 30 INJECTION, SOLUTION INTRAVENOUS at 22:15

## 2018-10-24 RX ADMIN — MORPHINE SULFATE 4 MG: 10 INJECTION INTRAVENOUS at 18:27

## 2018-10-24 RX ADMIN — POTASSIUM CHLORIDE 20 MEQ: 14.9 INJECTION, SOLUTION INTRAVENOUS at 19:46

## 2018-10-24 RX ADMIN — METOPROLOL TARTRATE 12.5 MG: 25 TABLET ORAL at 06:12

## 2018-10-24 RX ADMIN — SODIUM ACETATE 50 MEQ: 3.28 INJECTION, SOLUTION, CONCENTRATE INTRAVENOUS at 15:50

## 2018-10-24 RX ADMIN — SODIUM ACETATE 50 MEQ: 3.28 INJECTION, SOLUTION, CONCENTRATE INTRAVENOUS at 16:53

## 2018-10-24 RX ADMIN — DEXMEDETOMIDINE HYDROCHLORIDE 0.4 MCG/KG/HR: 100 INJECTION, SOLUTION INTRAVENOUS at 21:56

## 2018-10-24 ASSESSMENT — PAIN SCALES - GENERAL
PAINLEVEL_OUTOF10: 5
PAINLEVEL_OUTOF10: 7
PAINLEVEL_OUTOF10: 3
PAINLEVEL_OUTOF10: 4
PAINLEVEL_OUTOF10: 5
PAINLEVEL_OUTOF10: 5

## 2018-10-24 ASSESSMENT — PULMONARY FUNCTION TESTS: FVC: 1

## 2018-10-24 NOTE — CONSULTS
DATE OF SERVICE:  10/24/2018    PULMONARY CRITICAL CARE CONSULTATION    REQUESTING PHYSICIAN:  Berhane Canela MD    REASON FOR REQUEST:  Postoperative critical care management.    HISTORY OF PRESENT ILLNESS:  This is a 63-year-old gentleman with known past   medical history of chronic hypertension, dyslipidemia, chronic back pain, and   recently diagnosed multivessel coronary artery disease on cardiac   catheterization on 08/29/2018.  Patient admitted today for elective triple   vessel bypass.  ____ time recovering from anesthesia on full mechanical   ventilatory support with infusing epinephrine, insulin and Precedex infusions.    No intraoperative complications noted.  Patient did have bilateral mammary   artery harvest.    ALLERGIES:  No known drug allergies.    OUTPATIENT MEDICATIONS:  Epic indicate Lipitor, metoprolol, aspirin,   Nitrostat, Lidoderm patch, lisinopril, hydrochlorothiazide, gabapentin,   Flexeril, amlodipine, multivitamin and Motrin.    PAST MEDICAL HISTORY:  As indicated above in HPI.    SOCIAL HISTORY:  Unable to obtain at this time.    FAMILY HISTORY:  Unable to obtain given the clinical state.    REVIEW OF SYSTEMS:  Unable to obtain given clinical state.    PHYSICAL EXAMINATION:  VITAL SIGNS:  Temperature 98.3, pulse rate 76, blood pressure 114/74, on   epinephrine infusion, satting 100% on 100% FIO2.  GENERAL:  The patient, on full mechanical ventilatory support, is recovering   from anesthesia.  HEENT:  Normocephalic, atraumatic, anicteric.  Pupils approximately 2-3 mm   bilaterally equal, round, reactive.  CARDIOVASCULAR:  Rate within normal limits, intact pulses.  RESPIRATORY:  Diminished with scattered rhonchi, otherwise no wheezes or   rales.  ABDOMEN:  Soft, nondistended.  EXTREMITIES:  Without edema.  NEUROLOGIC:  Cranial nerves II-XII grossly intact.  PSYCHIATRIC:  Recovery from anesthesia, unable to assess at this time.    RESULTS:  Postop H and H of 11 and 32.  Sodium 138,  potassium is 3.6, ionized   calcium 1.02.  ABG with pH 7.34, pCO2 of 38, pO2 of 90, and 97% saturation on   100% FiO2.    ASSESSMENT:  1.  Status post 3-vessel CABG for coronary artery disease.  2.  Postop ventilator management.  3.  Acute blood loss anemia.  4.  Chronic hypertension.  5.  Chronic dyslipidemia.  6.  Chronic back pain.  7.  Hypocalcemia.  8.  Incomplete medical database.    PLAN:  Again, this is a 63-year-old gentleman with above indicated history on   presentation, status post 3-vessel CABG with utilization of bilateral mammary   arteries.  Patient at present time recovering from anesthesia on full   mechanical ventilatory support.  I will continue to adjust ventilator based on   the ABG and pulmonary mechanics.  Utilizing Precedex for sedation.  The   patient has continuous hemodynamic monitoring currently on epinephrine   infusion titrating for adequate systemic perfusion and cardiac output.  The   patient has chest drains in place.  We will monitor output for any interval   development of a coagulopathy significant bleed or potential obstruction.  We   will replace electrolytes aggressively maintaining ionized calcium greater   than 1.1, potassium greater than 4 and magnesium greater than 2.  Patient is   on post-heart protocols initiated on insulin infusion with serial fingerstick   blood sugars for the initial 24-hour period.  We will monitor H and H for need   of transfusions as well as for need of additional clotting factors in blood   products.  The patient will be resumed on statin therapy for dyslipidemia and   monitor closely for need to reinstitute antihypertensive therapy.    Prognosis is guarded.    Total critical care time not including billable procedures 35 minutes.       ____________________________________     MD VICTOR HUGO Chong / MARIXA    DD:  10/24/2018 13:17:51  DT:  10/24/2018 14:52:36    D#:  [id number]  Job#:  [id number]

## 2018-10-24 NOTE — PROGRESS NOTES
Med rec updated and complete  Allergies reviewed  Interviewed pt with wife at bedside with permission from pt.  Pt reports no antibiotics in the last 30 days.  Pt is taking METOPROLOL SR 25MG twice a day, not once a day.

## 2018-10-24 NOTE — DISCHARGE PLANNING
Anticipated Discharge Disposition: d/c home hh vs no hh     Action: Spoke to [facility] RN Jammie. Pt will possibly d/c home Monday if he continues to medically improve. Will assess for HH, O2 ect as gets closer.    Barriers to Discharge: orders, acceptance, etc    Plan: f/u w/ medical team, pt, cca

## 2018-10-24 NOTE — DISCHARGE INSTRUCTIONS
Discharge Instructions    Discharged to home by car with relative. Discharged via wheelchair, hospital escort: Yes.  Special equipment needed: Not Applicable    Be sure to schedule a follow-up appointment with your primary care doctor or any specialists as instructed.     Discharge Plan:        I understand that a diet low in cholesterol, fat, and sodium is recommended for good health. Unless I have been given specific instructions below for another diet, I accept this instruction as my diet prescription.   Other diet: Cardiac    Special Instructions:    [facility] Discharge Instructions    Activity:  1. NO driving for 4 weeks after surgery. You may ride as a passenger.  2. NO lifting more than 10 pounds for 6 weeks.  3. For the next 6 weeks, keep your elbows close to your body and move within a pain-free motion when lifting, pushing or pulling.  Do not stretch both arms backwards at the same time.    4. Walk at least 4 times per day, there is no maximum.  5. Other physical activities (sex, housework, gardening, etc.) are okay after 4 weeks.  6. Continue using incentive spirometer for 2 weeks.  If you are going home on oxygen and you were not on oxygen prior to surgery, keep using until you are oxygen free.  7. Weigh yourself daily.  Call your Cardiologist for a weight gain of 2 or more pounds within 48 hours.    Incision Care:  1. SHOWER EVERYDAY-no baths. Make sure to clean your incision(s) twice daily with plain, perfume and dye-free soap.  Then pat incision(s) dry with clean towel. No creams or lotions on your incision(s).    2. If you are discharging with a Prevena bandage on your chest, you or your home health nurse may remove the bandage 7 days after surgery, or sooner if the battery runs out or the device alarms. When the battery runs out, the bandage will lose suction and it will puff up.  To remove, slowly roll down the edges of the bandage. Throw away the entire bandage and the battery pack.   Keep the incision open to air and clean twice daily with soap and water.  3. If there is any increased redness or swelling, separation of the incision line, or thick drainage from any of your incisions, call [facility].    4. Continue to wear your MALIK Stockings for 4 weeks. You may take them off when you are in bed or when your legs are elevated.    General Instructions:  1. You have been referred to Cardiac Rehab.  You can start Cardiac Rehab 30 days after surgery.  If you do not have an appointment at the time of discharge call [phone] to schedule an appointment.  2. Your Primary Care Doctor typically handles home oxygen. Oxygen may be stopped when your oxygen level is consistently greater than 90.  Check with your Primary Care Doctor if you are unsure.  3. Take all of your medications (including pain medications) as prescribed.  Taking medications other than prescribed can result in serious injury.    For Patients Discharged with Narcotic Pain Medication:   1. If a refill is needed, understand that only 1 refill will be provided and you must come to the [facility]’ office for an appointment (72 hours’ notice is required to schedule and there are no weekend appointments).  2. If the pain medications you are discharged on are not working, you will need to bring your remaining prescription into the office in order to receive a new prescription.  3. If you were taking narcotics prior to your heart surgery, [facility] will provide you with one prescription and additional medications will need to be provided by your pain management doctor.  4. Do not drink alcohol while taking narcotics.  5. Lost or stolen medications will not be refilled.  If medications are stolen, report to law enforcement.    Contact [facility] at [phone] if you have any questions.    · Is patient discharged on Warfarin / Coumadin?   No     Depression / Suicide Risk    As you are discharged from  this [facility] facility, it is important to learn how to keep safe from harming yourself.    Recognize the warning signs:  · Abrupt changes in personality, positive or negative- including increase in energy   · Giving away possessions  · Change in eating patterns- significant weight changes-  positive or negative  · Change in sleeping patterns- unable to sleep or sleeping all the time   · Unwillingness or inability to communicate  · Depression  · Unusual sadness, discouragement and loneliness  · Talk of wanting to die  · Neglect of personal appearance   · Rebelliousness- reckless behavior  · Withdrawal from people/activities they love  · Confusion- inability to concentrate     If you or a loved one observes any of these behaviors or has concerns about self-harm, here's what you can do:  · Talk about it- your feelings and reasons for harming yourself  · Remove any means that you might use to hurt yourself (examples: pills, rope, extension cords, firearm)  · Get professional help from the community (Mental Health, Substance Abuse, psychological counseling)  · Do not be alone:Call your Safe Contact- someone whom you trust who will be there for you.  · Call your local CRISIS HOTLINE 742-9666 or 705-113-3921  · Call your local Children's Mobile Crisis Response Team [place] (056) 702-2366 or www.MetaCDN  · Call the toll free National Suicide Prevention Hotlines   · National Suicide Prevention Lifeline 428-360-THXN (8169)  · National Hope Line Network 800-SUICIDE (221-7912)

## 2018-10-24 NOTE — OP REPORT
DATE OF SERVICE:  10/24/2018    REFERRING PHYSICIAN:  Maciej Palacios MD    PREOPERATIVE DIAGNOSES:  Severe multivessel coronary artery disease and   angina.    POSTOPERATIVE DIAGNOSES:  Severe multivessel coronary artery disease and   angina.    PROCEDURES PERFORMED:  Coronary artery bypass grafting x3 (right internal   mammary artery to mid left anterior descending artery, left internal mammary   artery to first obtuse marginal branch, reverse saphenous vein graft to first   diagonal branch), endoscopic vein harvesting, insertion of left femoral   arterial line, and transesophageal echocardiography.    SURGEON:  Berhane Canela MD    ASSISTANT:  KENDRICK Abreu    ANESTHESIA:  General.    ANESTHESIOLOGIST:  Michael Mendoza MD    PACING WIRES:  Temporary monopolar epicardial ventricular pacing wires x2.    CHEST TUBES:  32-Occitan straight and angle chest tubes were placed in the   mediastinum and a 24-Occitan Sacha was placed in the left pleural space.  An   additional 24-Occitan Sacha was placed in the right pleural space.    INTRAOPERATIVE FINDINGS:  Normal caliber ascending aorta without calcification   or atherosclerotic disease.  The left internal mammary artery was 2 mm with   excellent flow.  The right internal mammary artery was 2 mm with excellent   flow.  The right greater saphenous vein was of normal caliber with minimal   varicosities and good quality.  The first diagonal branch was 1.75 mm and a   good target.  The main obtuse marginal branch was 2 mm and a good target.  The   mid left anterior descending artery was a 2.5 mm and a good target.  Normal   biventricular function pre and post-cardiopulmonary bypass, the patient was   hemodynamically stable on epinephrine infusion.    INDICATIONS FOR PROCEDURE:  This is a 62-year-old male who presented with   symptoms of angina, an abnormal myocardial perfusion study and was found to   have severe multivessel coronary artery disease on coronary  angiogram.  The   angiogram revealed a long calcified 80% proximal LAD stenosis after a   medium-sized diagonal branch with 70% mid stenosis.  There was a 70-80% mid   left circumflex lesion.  The patient was subsequently referred to me for   consideration for coronary bypass grafting.  Risks and benefits of CABG were   discussed with the patient.  The risks including myocardial infarction,   stroke, prolonged ventilation, pneumonia, tracheostomy, renal failure,   permanent pacemaker, bleeding, sternal wound infection, and sternal dehiscence   as well as death.  The patient understood these risks and wished to proceed.    PROCEDURE IN DETAIL:  The patient was brought to the operating room and placed   on the operating room table in supine position.  General anesthesia was   induced.  A central line as well as PA catheter was then inserted by the   anesthesiologist.  A Chavarria catheter was placed.  Intraoperative   transesophageal echocardiography revealed grossly normal biventricular   function with no wall motion abnormalities and no significant valvular   disease.  The patient was prepped and draped in the usual sterile fashion.  A   left femoral arterial line was placed via Seldinger technique and was secured   with silk sutures.  A 2-team approach was utilized and the right greater   saphenous vein was harvested endoscopically.  After it was harvested, it was   checked for leaks and was irrigated with heparinized saline.  Side branches   were ligated with Hemoclips.  The leg was then closed in standard fashion and   wrapped with an Ace wrap.  KENDRICK Crockett then assisted me throughout the   remainder of the operation.  Simultaneously, a standard median sternotomy   incision was performed and carried to subcutaneous tissue using Bovie   electrocautery.  The sternum was then scored in the midline and then divided   with a reciprocating saw.  The left internal mammary artery was then harvested   in a standard  pedicled fashion.  Side branches were ligated with Hemoclips and   divided.  The LIMA was then coated in a papaverine solution.  Next, the   pericardium was opened in the midline and T'd off at the diaphragm and   pericardial stay sutures were placed creating a pericardial well.  The   ascending aorta was inspected as well as the distal targets.  It appeared to   be feasible to use the right internal mammary artery.  Therefore, the chest   retractor was removed and the Rultract was then placed to elevate the right   hemisternum.  The right internal mammary artery was then harvested in a   standard pedicled fashion.  Side branches ligated with Hemoclips and divided.    The patient was then given systemic heparin.  The right internal mammary   artery was clipped and divided and soaked in a papaverine solution.  Chest   retractor was replaced.  The left internal mammary artery was then clipped and   divided and prepared and soaked in papaverine solution.  Two concentric   pursestring sutures were then placed in the ascending aorta.  An additional   pursestring was placed in the right atrial appendage.  Ascending aorta was   then cannulated with a 21-Burmese EZ glide cannula.  The cannula was de-aired   and connected to the cardiopulmonary bypass circuit.  The right atrial   appendage was then cannulated with a dual stage venous cannula and was then   connected to the bypass circuit.  An antegrade cardioplegia needle was then   placed in the ascending aorta after a pursestring was placed.  All lines were   then connected and when adequate ACT was reached, the patient was placed on   full cardiopulmonary bypass.  The ascending aorta was cross clamped and the   heart was arrested with cold antegrade cardioplegia.  A total of 900 mL were   given.  The heart was packed in topical slush for additional topical cooling.    We then gave antegrade cardioplegia every 20 minutes thereafter to ensure   adequate myocardial  protection.  We then examined our distal targets.  The   main obtuse marginal branch, first diagonal branch, and mid LAD, all appeared   to be a satisfactory targets.  The heart was positioned and an arteriotomy was   made in the first diagonal branch and an end-to-side anastomosis was then   made with running 7-0 Prolene using reverse saphenous vein graft to the   diagonal branch.  Vein was then flushed and the anastomosis was inspected for   leaks, which there were none.  The heart was then repositioned and the main   obtuse marginal branch was exposed.  The left internal mammary artery was   prepared and spatulated and an arteriotomy was made in the first obtuse   marginal branch and end-to-side anastomosis was constructed with running 7-0   Prolene suture utilizing the left internal mammary artery to the obtuse   marginal branch.  A single repair stitch was placed in the heel of the   anastomosis.  The heart was then repositioned and the right internal mammary   artery was prepared and spatulated.  The mid left anterior descending artery   was exposed.  An arteriotomy was made in the mid left anterior descending   artery and end-to-side anastomosis was constructed with running 7-0 Prolene   utilizing the right internal mammary artery to the left anterior descending   artery.  The bulldog was removed and a flush of red blood in the distribution   of the LAD indicating a patent anastomosis.  The aortic crossclamp was then   removed and replaced with a side-biting clamp across the ascending aorta.  The   antegrade cardioplegia needle was then removed.  An aortotomy was created   with a 4.5 mm punch of the site of previous antegrade cardioplegia site.  The   saphenous vein graft was trimmed, spatulated, and a proximal anastomosis was   performed using running 6-0 Prolene using the reverse saphenous vein graft to   the diagonal branch.  The ascending aorta was then de-aired with a 20-gauge   needle.  The aortic  side-biting clamp was then removed.  A large clip was   placed alongside the proximal anastomosis for identification purposes.  The   heart returned to normal sinus rhythm spontaneously and maintained sinus   rhythm during the rewarming process.  Temporary monopolar ventricular pacing   wires were placed along the diaphragmatic surface of the heart.  They were   tunneled underneath the skin and secured appropriately and then placed a total   of 4 chest tubes, 32-Jordanian straight and angle chest tubes were placed in the   mediastinum and a 24-Jordanian Sacha was placed into each pleural space.  All   chest tubes were secured appropriately with sutures.  The patient was then   rewarmed to a bladder temperature of 36.5 degrees Celsius, at which time we   weaned him from cardiopulmonary bypass without difficulty.  At this point,   protamine was given to reverse the effects of heparin.  The venous cannula was   then removed and the pursestring was tied down.  It was then reinforced with   a silk suture.  When all blood volume was transfused back to the patient, the   aortic cannula was then removed and the pursestring suture was then tied down.    I then inspected all my proximal and distal anastomoses as well as   cannulation sites and found them to be hemostatic.  The sternum was then   reapproximated with stainless steel wires.  The linea alba, subcutaneous   tissue, and skin were closed in standard fashion in layers.  All sponge,   needle, and instrument counts were reported as correct.  The patient was   returned to the intensive care unit in stable condition.  The total   cardiopulmonary bypass time was 69 minutes and total aortic cross clamp time   was 48 minutes.       ____________________________________     MD SHUKRI Herr / MARIXA    DD:  10/24/2018 12:37:54  DT:  10/24/2018 13:18:40    D#:  [id number]  Job#:  [id number]

## 2018-10-24 NOTE — OR SURGEON
Immediate Post OP Note    PreOp Diagnosis: CAD    PostOp Diagnosis: CAD    Procedure(s):  MULTIPLE CORONARY ARTERY BYPASS x 3 ENDO VEIN HARVEST- RIGHT LEG, BILATERAL ANTERIOR MAMMARY ARTERY HARVEST - Wound Class: Clean with Drain  LORENA - Wound Class: Clean Contaminated    Surgeon(s):  Berhane Canela M.D.    Anesthesiologist/Type of Anesthesia:  Anesthesiologist: Michael Mendoza M.D.  Anesthesia Technician: Yeimy Chacon/General    Surgical Staff:  Assistant: GRACIE Garvey  Circulator: Laurie Dennis R.N.  Perfusionist: Homar Hernandez  Scrub Person: Molly Lozada; Brian Hall    Specimens removed if any:  * No specimens in log *    Estimated Blood Loss: minimal    Findings:   Normal caliber ascending aorta without calcification or atherosclerotic disease. LIMA was 2mm with excellent flow. AI was 2mm with excellent flow. Right greater saphenous vein was of normal caliber with minimal varicosities and good quality. First diagonal branch was 1.75mm and a good taret. Main OM branch was 2mm and a good target. Mid LAD was 2.5mm and a good target. Normal biventricular function pre and post-CPB. Pt was HDS on an epinephrine infusion.    Complications: none        10/24/2018 12:18 PM Berhane Canela M.D.

## 2018-10-25 ENCOUNTER — APPOINTMENT (OUTPATIENT)
Dept: RADIOLOGY | Facility: MEDICAL CENTER | Age: 63
DRG: 235 | End: 2018-10-25
Attending: CLINICAL NURSE SPECIALIST
Payer: COMMERCIAL

## 2018-10-25 PROBLEM — E83.51 HYPOCALCEMIA: Status: ACTIVE | Noted: 2018-10-25

## 2018-10-25 PROBLEM — J96.01 ACUTE RESPIRATORY FAILURE WITH HYPOXIA (HCC): Status: ACTIVE | Noted: 2018-10-25

## 2018-10-25 LAB
ACTION RANGE TRIGGERED IACRT: NO
ANION GAP SERPL CALC-SCNC: 10 MMOL/L (ref 0–11.9)
BASE EXCESS BLDA CALC-SCNC: -3 MMOL/L (ref -4–3)
BASE EXCESS BLDA CALC-SCNC: 0 MMOL/L (ref -4–3)
BASE EXCESS BLDA CALC-SCNC: 1 MMOL/L (ref -4–3)
BASE EXCESS BLDA CALC-SCNC: 3 MMOL/L (ref -4–3)
BASE EXCESS BLDA CALC-SCNC: 5 MMOL/L (ref -4–3)
BODY TEMPERATURE: ABNORMAL DEGREES
BUN SERPL-MCNC: 18 MG/DL (ref 8–22)
CA-I BLD ISE-SCNC: 0.97 MMOL/L (ref 1.1–1.3)
CA-I BLD ISE-SCNC: 1.01 MMOL/L (ref 1.1–1.3)
CA-I BLD ISE-SCNC: 1.13 MMOL/L (ref 1.1–1.3)
CALCIUM SERPL-MCNC: 7.4 MG/DL (ref 8.5–10.5)
CHLORIDE SERPL-SCNC: 104 MMOL/L (ref 96–112)
CO2 BLDA-SCNC: 24 MMOL/L (ref 20–33)
CO2 BLDA-SCNC: 27 MMOL/L (ref 20–33)
CO2 BLDA-SCNC: 27 MMOL/L (ref 20–33)
CO2 BLDA-SCNC: 29 MMOL/L (ref 20–33)
CO2 BLDA-SCNC: 29 MMOL/L (ref 20–33)
CO2 SERPL-SCNC: 22 MMOL/L (ref 20–33)
CREAT SERPL-MCNC: 0.7 MG/DL (ref 0.5–1.4)
EKG IMPRESSION: NORMAL
ERYTHROCYTE [DISTWIDTH] IN BLOOD BY AUTOMATED COUNT: 42.5 FL (ref 35.9–50)
GLUCOSE BLD-MCNC: 101 MG/DL (ref 65–99)
GLUCOSE BLD-MCNC: 108 MG/DL (ref 65–99)
GLUCOSE BLD-MCNC: 109 MG/DL (ref 65–99)
GLUCOSE BLD-MCNC: 118 MG/DL (ref 65–99)
GLUCOSE BLD-MCNC: 119 MG/DL (ref 65–99)
GLUCOSE BLD-MCNC: 119 MG/DL (ref 65–99)
GLUCOSE BLD-MCNC: 121 MG/DL (ref 65–99)
GLUCOSE BLD-MCNC: 131 MG/DL (ref 65–99)
GLUCOSE BLD-MCNC: 138 MG/DL (ref 65–99)
GLUCOSE BLD-MCNC: 140 MG/DL (ref 65–99)
GLUCOSE BLD-MCNC: 143 MG/DL (ref 65–99)
GLUCOSE BLD-MCNC: 146 MG/DL (ref 65–99)
GLUCOSE BLD-MCNC: 158 MG/DL (ref 65–99)
GLUCOSE BLD-MCNC: 159 MG/DL (ref 65–99)
GLUCOSE BLD-MCNC: 159 MG/DL (ref 65–99)
GLUCOSE BLD-MCNC: 160 MG/DL (ref 65–99)
GLUCOSE BLD-MCNC: 162 MG/DL (ref 65–99)
GLUCOSE BLD-MCNC: 165 MG/DL (ref 65–99)
GLUCOSE BLD-MCNC: 169 MG/DL (ref 65–99)
GLUCOSE BLD-MCNC: 178 MG/DL (ref 65–99)
GLUCOSE BLD-MCNC: 58 MG/DL (ref 65–99)
GLUCOSE BLD-MCNC: 77 MG/DL (ref 65–99)
GLUCOSE BLD-MCNC: 83 MG/DL (ref 65–99)
GLUCOSE BLD-MCNC: 86 MG/DL (ref 65–99)
GLUCOSE BLD-MCNC: 87 MG/DL (ref 65–99)
GLUCOSE SERPL-MCNC: 143 MG/DL (ref 65–99)
HCO3 BLDA-SCNC: 23.1 MMOL/L (ref 17–25)
HCO3 BLDA-SCNC: 25.6 MMOL/L (ref 17–25)
HCO3 BLDA-SCNC: 26.1 MMOL/L (ref 17–25)
HCO3 BLDA-SCNC: 27.4 MMOL/L (ref 17–25)
HCO3 BLDA-SCNC: 28.3 MMOL/L (ref 17–25)
HCT VFR BLD AUTO: 31.9 % (ref 42–52)
HCT VFR BLD CALC: 22 % (ref 42–52)
HCT VFR BLD CALC: 25 % (ref 42–52)
HCT VFR BLD CALC: 25 % (ref 42–52)
HCT VFR BLD CALC: 33 % (ref 42–52)
HCT VFR BLD CALC: 36 % (ref 42–52)
HGB BLD-MCNC: 11.2 G/DL (ref 14–18)
HGB BLD-MCNC: 11.8 G/DL (ref 14–18)
HGB BLD-MCNC: 12.2 G/DL (ref 14–18)
HGB BLD-MCNC: 7.5 G/DL (ref 14–18)
HGB BLD-MCNC: 8.5 G/DL (ref 14–18)
HGB BLD-MCNC: 8.5 G/DL (ref 14–18)
HOROWITZ INDEX BLDA+IHG-RTO: 156 MM[HG]
HOROWITZ INDEX BLDA+IHG-RTO: 293 MM[HG]
HOROWITZ INDEX BLDA+IHG-RTO: 367 MM[HG]
HOROWITZ INDEX BLDA+IHG-RTO: 375 MM[HG]
HOROWITZ INDEX BLDA+IHG-RTO: 479 MM[HG]
INST. QUALIFIED PATIENT IIQPT: YES
MCH RBC QN AUTO: 35.2 PG (ref 27–33)
MCHC RBC AUTO-ENTMCNC: 36.3 G/DL (ref 33.7–35.3)
MCV RBC AUTO: 96.9 FL (ref 81.4–97.8)
O2/TOTAL GAS SETTING VFR VENT: 100 %
O2/TOTAL GAS SETTING VFR VENT: 100 %
O2/TOTAL GAS SETTING VFR VENT: 80 %
O2/TOTAL GAS SETTING VFR VENT: 80 %
O2/TOTAL GAS SETTING VFR VENT: 90 %
PCO2 BLDA: 38 MMHG (ref 26–37)
PCO2 BLDA: 39.3 MMHG (ref 26–37)
PCO2 BLDA: 40.1 MMHG (ref 26–37)
PCO2 BLDA: 42.4 MMHG (ref 26–37)
PCO2 BLDA: 45.1 MMHG (ref 26–37)
PCO2 TEMP ADJ BLDA: 38 MMHG (ref 26–37)
PCO2 TEMP ADJ BLDA: 39.3 MMHG (ref 26–37)
PCO2 TEMP ADJ BLDA: 40.1 MMHG (ref 26–37)
PCO2 TEMP ADJ BLDA: 42.4 MMHG (ref 26–37)
PCO2 TEMP ADJ BLDA: 45.1 MMHG (ref 26–37)
PH BLDA: 7.34 [PH] (ref 7.4–7.5)
PH BLDA: 7.36 [PH] (ref 7.4–7.5)
PH BLDA: 7.42 [PH] (ref 7.4–7.5)
PH BLDA: 7.45 [PH] (ref 7.4–7.5)
PH BLDA: 7.48 [PH] (ref 7.4–7.5)
PH TEMP ADJ BLDA: 7.34 [PH] (ref 7.4–7.5)
PH TEMP ADJ BLDA: 7.36 [PH] (ref 7.4–7.5)
PH TEMP ADJ BLDA: 7.42 [PH] (ref 7.4–7.5)
PH TEMP ADJ BLDA: 7.45 [PH] (ref 7.4–7.5)
PH TEMP ADJ BLDA: 7.48 [PH] (ref 7.4–7.5)
PLATELET # BLD AUTO: 142 K/UL (ref 164–446)
PMV BLD AUTO: 9.1 FL (ref 9–12.9)
PO2 BLDA: 156 MMHG (ref 64–87)
PO2 BLDA: 234 MMHG (ref 64–87)
PO2 BLDA: 300 MMHG (ref 64–87)
PO2 BLDA: 330 MMHG (ref 64–87)
PO2 BLDA: 479 MMHG (ref 64–87)
PO2 TEMP ADJ BLDA: 156 MMHG (ref 64–87)
PO2 TEMP ADJ BLDA: 234 MMHG (ref 64–87)
PO2 TEMP ADJ BLDA: 300 MMHG (ref 64–87)
PO2 TEMP ADJ BLDA: 330 MMHG (ref 64–87)
PO2 TEMP ADJ BLDA: 479 MMHG (ref 64–87)
POTASSIUM BLD-SCNC: 3.7 MMOL/L (ref 3.6–5.5)
POTASSIUM BLD-SCNC: 3.8 MMOL/L (ref 3.6–5.5)
POTASSIUM BLD-SCNC: 4.7 MMOL/L (ref 3.6–5.5)
POTASSIUM BLD-SCNC: 4.7 MMOL/L (ref 3.6–5.5)
POTASSIUM BLD-SCNC: 5 MMOL/L (ref 3.6–5.5)
POTASSIUM SERPL-SCNC: 3.4 MMOL/L (ref 3.6–5.5)
POTASSIUM SERPL-SCNC: 4.1 MMOL/L (ref 3.6–5.5)
RBC # BLD AUTO: 3.27 M/UL (ref 4.7–6.1)
SAO2 % BLDA: 100 % (ref 93–99)
SAO2 % BLDA: 99 % (ref 93–99)
SODIUM BLD-SCNC: 132 MMOL/L (ref 135–145)
SODIUM BLD-SCNC: 133 MMOL/L (ref 135–145)
SODIUM BLD-SCNC: 133 MMOL/L (ref 135–145)
SODIUM BLD-SCNC: 135 MMOL/L (ref 135–145)
SODIUM BLD-SCNC: 136 MMOL/L (ref 135–145)
SODIUM SERPL-SCNC: 136 MMOL/L (ref 135–145)
SPECIMEN DRAWN FROM PATIENT: ABNORMAL
WBC # BLD AUTO: 14.7 K/UL (ref 4.8–10.8)

## 2018-10-25 PROCEDURE — 93010 ELECTROCARDIOGRAM REPORT: CPT | Performed by: INTERNAL MEDICINE

## 2018-10-25 PROCEDURE — 700111 HCHG RX REV CODE 636 W/ 250 OVERRIDE (IP): Performed by: CLINICAL NURSE SPECIALIST

## 2018-10-25 PROCEDURE — 99291 CRITICAL CARE FIRST HOUR: CPT | Performed by: INTERNAL MEDICINE

## 2018-10-25 PROCEDURE — 700102 HCHG RX REV CODE 250 W/ 637 OVERRIDE(OP): Performed by: NURSE PRACTITIONER

## 2018-10-25 PROCEDURE — 85027 COMPLETE CBC AUTOMATED: CPT

## 2018-10-25 PROCEDURE — 71045 X-RAY EXAM CHEST 1 VIEW: CPT

## 2018-10-25 PROCEDURE — 700111 HCHG RX REV CODE 636 W/ 250 OVERRIDE (IP): Performed by: INTERNAL MEDICINE

## 2018-10-25 PROCEDURE — 700102 HCHG RX REV CODE 250 W/ 637 OVERRIDE(OP): Performed by: CLINICAL NURSE SPECIALIST

## 2018-10-25 PROCEDURE — 93005 ELECTROCARDIOGRAM TRACING: CPT | Performed by: CLINICAL NURSE SPECIALIST

## 2018-10-25 PROCEDURE — 80048 BASIC METABOLIC PNL TOTAL CA: CPT

## 2018-10-25 PROCEDURE — 700111 HCHG RX REV CODE 636 W/ 250 OVERRIDE (IP): Performed by: NURSE PRACTITIONER

## 2018-10-25 PROCEDURE — 94667 MNPJ CHEST WALL 1ST: CPT

## 2018-10-25 PROCEDURE — 700105 HCHG RX REV CODE 258: Performed by: CLINICAL NURSE SPECIALIST

## 2018-10-25 PROCEDURE — A9270 NON-COVERED ITEM OR SERVICE: HCPCS | Performed by: CLINICAL NURSE SPECIALIST

## 2018-10-25 PROCEDURE — 770020 HCHG ROOM/CARE - TELE (206)

## 2018-10-25 PROCEDURE — 94668 MNPJ CHEST WALL SBSQ: CPT

## 2018-10-25 PROCEDURE — 82962 GLUCOSE BLOOD TEST: CPT | Mod: 91

## 2018-10-25 PROCEDURE — 700105 HCHG RX REV CODE 258: Performed by: INTERNAL MEDICINE

## 2018-10-25 RX ORDER — POTASSIUM CHLORIDE 7.45 MG/ML
10 INJECTION INTRAVENOUS ONCE
Status: COMPLETED | OUTPATIENT
Start: 2018-10-25 | End: 2018-10-25

## 2018-10-25 RX ORDER — POTASSIUM CHLORIDE 14.9 MG/ML
20 INJECTION INTRAVENOUS ONCE
Status: COMPLETED | OUTPATIENT
Start: 2018-10-25 | End: 2018-10-25

## 2018-10-25 RX ORDER — CALCIUM CHLORIDE 100 MG/ML
1 INJECTION INTRAVENOUS; INTRAVENTRICULAR ONCE
Status: DISCONTINUED | OUTPATIENT
Start: 2018-10-25 | End: 2018-10-25

## 2018-10-25 RX ADMIN — SENNOSIDES AND DOCUSATE SODIUM 2 TABLET: 8.6; 5 TABLET ORAL at 17:00

## 2018-10-25 RX ADMIN — TRAMADOL HYDROCHLORIDE 50 MG: 50 TABLET, FILM COATED ORAL at 04:42

## 2018-10-25 RX ADMIN — OXYCODONE HYDROCHLORIDE 10 MG: 10 TABLET ORAL at 06:42

## 2018-10-25 RX ADMIN — SODIUM CHLORIDE 3 UNITS/HR: 9 INJECTION, SOLUTION INTRAVENOUS at 05:25

## 2018-10-25 RX ADMIN — POTASSIUM CHLORIDE 20 MEQ: 14.9 INJECTION, SOLUTION INTRAVENOUS at 01:35

## 2018-10-25 RX ADMIN — VANCOMYCIN HYDROCHLORIDE 1500 MG: 100 INJECTION, POWDER, LYOPHILIZED, FOR SOLUTION INTRAVENOUS at 11:12

## 2018-10-25 RX ADMIN — GABAPENTIN 100 MG: 100 CAPSULE ORAL at 05:24

## 2018-10-25 RX ADMIN — SENNOSIDES AND DOCUSATE SODIUM 2 TABLET: 8.6; 5 TABLET ORAL at 05:24

## 2018-10-25 RX ADMIN — OXYCODONE HYDROCHLORIDE 10 MG: 10 TABLET ORAL at 19:33

## 2018-10-25 RX ADMIN — CLOPIDOGREL 75 MG: 75 TABLET, FILM COATED ORAL at 08:56

## 2018-10-25 RX ADMIN — ASPIRIN 81 MG: 81 TABLET, COATED ORAL at 08:56

## 2018-10-25 RX ADMIN — MUPIROCIN 1 APPLICATION: 20 OINTMENT TOPICAL at 05:25

## 2018-10-25 RX ADMIN — METOPROLOL TARTRATE 12.5 MG: 25 TABLET, FILM COATED ORAL at 17:00

## 2018-10-25 RX ADMIN — ATORVASTATIN CALCIUM 80 MG: 20 TABLET, FILM COATED ORAL at 05:24

## 2018-10-25 RX ADMIN — POTASSIUM CHLORIDE 10 MEQ: 7.46 INJECTION, SOLUTION INTRAVENOUS at 06:43

## 2018-10-25 RX ADMIN — OXYCODONE HYDROCHLORIDE 10 MG: 10 TABLET ORAL at 02:50

## 2018-10-25 RX ADMIN — TRAMADOL HYDROCHLORIDE 50 MG: 50 TABLET, FILM COATED ORAL at 22:42

## 2018-10-25 RX ADMIN — MAGNESIUM SULFATE 1 G: 1 INJECTION INTRAVENOUS at 12:10

## 2018-10-25 RX ADMIN — GABAPENTIN 100 MG: 100 CAPSULE ORAL at 12:09

## 2018-10-25 RX ADMIN — MUPIROCIN 1 APPLICATION: 20 OINTMENT TOPICAL at 17:00

## 2018-10-25 RX ADMIN — CALCIUM CHLORIDE 1 G: 100 INJECTION, SOLUTION INTRAVENOUS at 11:11

## 2018-10-25 RX ADMIN — OXYCODONE HYDROCHLORIDE 10 MG: 10 TABLET ORAL at 09:38

## 2018-10-25 RX ADMIN — VANCOMYCIN HYDROCHLORIDE 1500 MG: 100 INJECTION, POWDER, LYOPHILIZED, FOR SOLUTION INTRAVENOUS at 22:43

## 2018-10-25 RX ADMIN — GABAPENTIN 100 MG: 100 CAPSULE ORAL at 17:00

## 2018-10-25 RX ADMIN — EPINEPHRINE 0.04 MCG/KG/MIN: 1 INJECTION INTRAMUSCULAR; INTRAVENOUS; SUBCUTANEOUS at 04:27

## 2018-10-25 RX ADMIN — OXYCODONE HYDROCHLORIDE 10 MG: 10 TABLET ORAL at 14:25

## 2018-10-25 RX ADMIN — ENOXAPARIN SODIUM 40 MG: 100 INJECTION SUBCUTANEOUS at 17:00

## 2018-10-25 RX ADMIN — ONDANSETRON HYDROCHLORIDE 4 MG: 2 INJECTION INTRAMUSCULAR; INTRAVENOUS at 10:49

## 2018-10-25 RX ADMIN — POTASSIUM CHLORIDE 10 MEQ: 1500 TABLET, EXTENDED RELEASE ORAL at 17:00

## 2018-10-25 ASSESSMENT — ENCOUNTER SYMPTOMS
SPUTUM PRODUCTION: 1
CARDIOVASCULAR NEGATIVE: 1
SHORTNESS OF BREATH: 0
EYES NEGATIVE: 1
PALPITATIONS: 0
CHILLS: 0
FEVER: 0
NAUSEA: 0
COUGH: 1
CONSTITUTIONAL NEGATIVE: 1
NEUROLOGICAL NEGATIVE: 1
MUSCULOSKELETAL NEGATIVE: 1
ABDOMINAL PAIN: 0
PSYCHIATRIC NEGATIVE: 1
RESPIRATORY NEGATIVE: 1
VOMITING: 0
GASTROINTESTINAL NEGATIVE: 1
DEPRESSION: 0
SORE THROAT: 0

## 2018-10-25 ASSESSMENT — PAIN SCALES - GENERAL
PAINLEVEL_OUTOF10: 9
PAINLEVEL_OUTOF10: 8
PAINLEVEL_OUTOF10: 4
PAINLEVEL_OUTOF10: 7
PAINLEVEL_OUTOF10: 6
PAINLEVEL_OUTOF10: 6
PAINLEVEL_OUTOF10: 5
PAINLEVEL_OUTOF10: 6
PAINLEVEL_OUTOF10: 3
PAINLEVEL_OUTOF10: 4
PAINLEVEL_OUTOF10: 6
PAINLEVEL_OUTOF10: 2
PAINLEVEL_OUTOF10: 4
PAINLEVEL_OUTOF10: 6
PAINLEVEL_OUTOF10: 6
PAINLEVEL_OUTOF10: 5
PAINLEVEL_OUTOF10: 6
PAINLEVEL_OUTOF10: 6

## 2018-10-25 ASSESSMENT — COPD QUESTIONNAIRES
DURING THE PAST 4 WEEKS HOW MUCH DID YOU FEEL SHORT OF BREATH: NONE/LITTLE OF THE TIME
DO YOU EVER COUGH UP ANY MUCUS OR PHLEGM?: NO/ONLY WITH OCCASIONAL COLDS OR INFECTIONS
COPD SCREENING SCORE: 2
HAVE YOU SMOKED AT LEAST 100 CIGARETTES IN YOUR ENTIRE LIFE: NO/DON'T KNOW

## 2018-10-25 ASSESSMENT — LIFESTYLE VARIABLES: EVER_SMOKED: NEVER

## 2018-10-25 NOTE — ASSESSMENT & PLAN NOTE
S/P 3V Cabg 10/24  Asa/Plavix/Statin/BB  Continue post heart protocols  Continue IS/PEP/Mobilize  Continue lasix  Doing well

## 2018-10-25 NOTE — CARE PLAN
Problem: Post Op Day 1 CABG/Heart Valve Replacement  Goal: Optimal care of the post op CABG/heart valve replacement Post Op Day 1    Intervention: EKG and CXR completed  Complete.  Intervention: Antibiotics are discontinued within 24 hours of anesthesia end time unless indication documented for continuation beyond 24 hours  Per CTS wanted to continue abx see pharmacy note from 10/24  Intervention: Daily Weights  Complete.  Intervention: Up in chair for all meals  Up in chair for breakfast.  Intervention: Ambulate in am if stable. First ambulation is 25 feet. Repeat x 3 as tolerated.  Ambulate again before bed.  Pt still on epi, had swan, art line. Will attempt to ambulate after breakfast.   Intervention: Discontinue durán catheter unless documented reason for continuation  POD 1.  Intervention: IS q 1 hour while awake and record best IS volume  1250ml  Intervention: Graduated elastic stockings  Complete.   Intervention: If patient is CABG or on home beta-blocker, start Beta-Blocker on POD 1 or POD 2 or contraindication documented  Patient still on epi.

## 2018-10-25 NOTE — CARE PLAN
Problem: Day of surgery post CABG/Heart valve replacement  Goal: Stabilization in immediate post op period    Intervention: VS q 15 min x 4 hours, then q 1 hour. Include temperature immediately upon arrival. Check CO/CI q 2-4 hours and PRN  Completed and documented  Intervention: If radial artery used, elevate arm, no BP checks or needle sticks from affected arm, monitor ulnar pulse and capillary refill  N/a; radial artery not utilized  Intervention: First post op hour labs and diagnostics per MD order  Completed and reviewed  Intervention: Serum K q 6 hours x 24 hours.  ABG and CBC prn.  Completed and reviewed; Medication administered per MAR  Intervention: For FSBS greater than 130, start Post Cardiac Surgery Insulin Drip Protocol  Intiated and documented per MAR  Intervention: FSBS frequency as per Cardiac Surgery Insulin Drip Protocol  Completed and documented  Intervention: For patients on Beta Blockers: verify dose given prior to surgery or within 6 hours after arrival to the unit  Reviewed per mar  Intervention: Chest tube to 20 cm suction, record CT drainage with VS  Completed and documented  Intervention: For CT drainage > 300 cc in first post op hour and/or 150 cc in subsequent hours: platelets, coag screen, fibrinogen, H&H per order  N/A  Intervention: Titrate and wean off vasoactive drips per patient's condition and per MD order while maintaining SBP  mmHg per MD order  Completed and documented per MAR  Intervention: VAP protocol in place  COMPLETED AND DOCUMENTED  Intervention: Wean from vent per protocol (see protocol), extubation goal with 2-6 hours post op.  Completed and extubated      Problem: Knowledge Deficit  Goal: Patient/Significant other demonstrates understanding of disease process, treatment plan, medications and discharge instructions  Outcome: PROGRESSING AS EXPECTED  Pt and Family oriented to unit routine. Pt and family educated regarding activity, diet, meds and plan of care;  questions answered; verbalized understanding. Pt and Family denies having further needs at this time.

## 2018-10-25 NOTE — PROGRESS NOTES
Lab notified regarding green top sent at 9057 on 10/24 that the result was posted as;  10/24 @1583    K 3.4  Lab said they would correct result to show at 10/24 9396

## 2018-10-25 NOTE — PROGRESS NOTES
Cardiovascular Surgery Progress Note    Name: Santi Landers  MRN: [id number]  : 1955  Admit Date: 10/24/2018  5:05 AM  Procedure:  Procedure(s) and Anesthesia Type:     * MULTIPLE CORONARY ARTERY BYPASS x 3 ENDO VEIN HARVEST- RIGHT LEG, BILATERAL ANTERIOR MAMMARY ARTERY HARVEST - General     * LORENA - General  1 Day Post-Op    Vitals:  Patient Vitals for the past 8 hrs:   Temp SpO2 O2 Delivery O2 (LPM) Pulse Heart Rate (Monitored) Resp NIBP FiO2%   10/25/18 1200 37.4 °C (99.3 °F) 90 % Silicone Nasal Cannula 0.5 92 93 (!) 27 136/84 -   10/25/18 1130 - 92 % - - 98 98 18 113/75 -   10/25/18 1115 - 88 % - - 100 (!) 104 (!) 27 133/84 -   10/25/18 1100 - 92 % - 0 (!) 101 (!) 101 (!) 22 114/74 21 %   10/25/18 1045 - 94 % - - (!) 104 (!) 103 (!) 21 - -   10/25/18 1030 - 94 % - - 98 99 (!) 25 (!) 99/62 -   10/25/18 1015 - 93 % - - (!) 101 99 (!) 23 111/56 -   10/25/18 1000 - 93 % Silicone Nasal Cannula 1 98 98 (!) 23 100/64 -   10/25/18 0945 - 93 % - - 96 97 19 (!) 97/64 -   10/25/18 0930 - 94 % - - 100 95 (!) 22 101/64 -   10/25/18 0915 - 94 % - - 100 100 (!) 27 (!) 99/65 -   10/25/18 0900 - 95 % - - 97 97 (!) 23 (!) 78/59 -   10/25/18 0845 - 95 % - - 98 99 (!) 23 103/58 -   10/25/18 0830 - 95 % - - 94 95 20 (!) 95/59 -   10/25/18 0815 - 96 % - - 97 98 (!) 24 107/62 -   10/25/18 0808 - 95 % - 1 (!) 101 (!) 101 (!) 23 - -   10/25/18 0800 37.5 °C (99.5 °F) 95 % Silicone Nasal Cannula 2 95 100 (!) 23 103/56 -   10/25/18 0745 - 96 % - - 97 99 16 102/61 -   10/25/18 0730 - 95 % Silicone Nasal Cannula 2 (!) 103 (!) 102 (!) 30 103/65 -   10/25/18 0630 - 95 % - - 99 100 19 (!) 85/58 -   10/25/18 0615 - 92 % - - (!) 109 (!) 106 (!) 27 - -   10/25/18 0600 37.5 °C (99.5 °F) 93 % Silicone Nasal Cannula 1 100 (!) 108 (!) 25 - -   10/25/18 0545 - 95 % - - (!) 110 (!) 107 (!) 28 113/61 -   10/25/18 0530 - 94 % - - (!) 104 (!) 103 (!) 22 104/58 -   10/25/18 0515 - 96 % - - (!) 101 (!) 102 15 (!) 84/48 -   10/25/18 0500 - 95 % - - (!)  101 (!) 103 (!) 27 (!) 81/51 -     Temp (24hrs), Av.3 °C (99.2 °F), Min:36.7 °C (98.1 °F), Max:37.5 °C (99.5 °F)      Respiratory:  Adams Vent Mode: Spont, PEEP/CPAP: 8, FiO2: 30, P Peak (PIP): 15, P MEAN: 10 Respiration: (!) 27, Pulse Oximetry: 90 %, O2 Daily Delivery Respiratory : Room Air with O2 Available     Chest Tube Drains:          Fluids:    Intake/Output Summary (Last 24 hours) at 10/25/18 1255  Last data filed at 10/25/18 0600   Gross per 24 hour   Intake          9096.39 ml   Output             4050 ml   Net          5046.39 ml     Admit weight: Weight: 96.9 kg (213 lb 10 oz)  Current weight: Weight: 96.9 kg (213 lb 10 oz) (10/24/18 0516)    Labs:  Recent Labs      10/23/18   1402  10/24/18   1253  10/25/18   0500   WBC  7.2   --   14.7*   RBC  4.57*   --   3.27*   HEMOGLOBIN  16.2   --   11.8*   HEMATOCRIT  44.2   --   31.9*   MCV  96.7   --   96.9   MCH  35.4*   --   35.2*   MCHC  36.7*   --   36.3*   RDW  42.2   --   42.5   PLATELETCT  192  141*  142*   MPV  8.9*   --   9.1     Recent Labs      10/23/18   1402   NEUTSPOLYS  59.30   LYMPHOCYTES  27.50   MONOCYTES  9.90   EOSINOPHILS  2.40   BASOPHILS  0.60     Recent Labs      10/23/18   1402  10/24/18   1159  10/24/18   1740  10/25/18   0500   SODIUM  136   --    --   136   POTASSIUM  3.8  3.4*  3.6  4.1   CHLORIDE  101   --    --   104   CO2  26   --    --   22   GLUCOSE  114*   --    --   143*   BUN  22   --    --   18   CREATININE  0.84   --    --   0.70   CALCIUM  10.0   --    --   7.4*     Recent Labs      10/23/18   1402  10/24/18   1253   APTT  26.8  28.8   INR  1.08  1.64*       Medications:  • insulin lispro  0-10 Units     • insulin NPH  15 Units     • insulin lispro  10 Units     • insulin lispro  6 Units     • insulin lispro  0-20 Units     • atorvastatin  80 mg     • gabapentin  100 mg     • senna-docusate  2 Tab     • enoxaparin  40 mg     • mupirocin  1 Application     • potassium chloride SA  10 mEq      Or   • potassium  chloride  10 mEq     • magnesium sulfate  1 g 0 g (10/24/18 9538)   • metoprolol  12.5 mg      Followed by   • [START ON 10/26/2018] metoprolol  25 mg     • aspirin EC  81 mg     • clopidogrel  75 mg     • MD Alert...Vancomycin per Pharmacy       • vancomycin  1.5 g 1,500 mg (10/25/18 1112)        Ordered Medications:    ASA - Yes    Plavix - Yes    Post-operative Beta Blockers - Yes    Ace Inhibitor - No; contraindicated because of Other ef normal    Statin - Yes          Central Line:  Type of line: CVP  Date of insertion: 10/24/2018  Date of removal:     Exam:   Review of Systems   Constitutional: Negative.    HENT: Negative.    Eyes: Negative.    Respiratory: Negative.    Cardiovascular: Negative.    Gastrointestinal: Negative.    Genitourinary: Negative.    Musculoskeletal: Negative.    Skin: Negative.    Neurological: Negative.    Psychiatric/Behavioral: Negative.        Physical Exam   Constitutional: He is oriented to person, place, and time. He appears well-developed and well-nourished.   HENT:   Head: Normocephalic and atraumatic.   Eyes: Pupils are equal, round, and reactive to light.   Neck: Normal range of motion. Neck supple.   Cardiovascular: Normal rate and regular rhythm.    Pulmonary/Chest: Effort normal.   Decreased at bases.    Abdominal: Soft. Bowel sounds are normal.   Musculoskeletal: Normal range of motion.   Neurological: He is alert and oriented to person, place, and time.   Skin: Skin is warm and dry.   Wounds CDI   Psychiatric: He has a normal mood and affect.       Quality Measures:   Quality-Core Measures   Reviewed items::  EKG reviewed, Radiology images reviewed, Labs reviewed and Medications reviewed  Chavarria catheter::  No Chavarria  Central line in place:  Need for access  DVT prophylaxis pharmacological::  Enoxaparin (Lovenox)  DVT prophylaxis - mechanical:  SCDs  Ulcer Prophylaxis::  Yes  Assessed for rehabilitation services:  Patient returned to prior level of function, rehabilitation  not indicated at this time      Assessment/Plan:  POD 1 Extubated, HDS, no drips, chest tube output minimal and negative for air leak.  Neuro grossly intact.  Plan:  Transition to tele status.  DC chest tubes.  DC durán.    Active Hospital Problems    Diagnosis   • Acute respiratory failure with hypoxia (HCC) [J96.01]   • Hypocalcemia [E83.51]   • Coronary artery disease of native artery of native heart with stable angina pectoris (HCC) [I25.118]   • Dyslipidemia [E78.5]

## 2018-10-25 NOTE — PROGRESS NOTES
"Pharmacy Kinetics 63 y.o. male on vancomycin day # 2 10/25/2018    Currently on Vancomycin 1500 mg iv q12hr (1130, 2330)     Indication for Treatment: surgical site prophylaxis     Pertinent history per medical record: Admitted on 10/24/2018 for CABG. Bilateral mammary artery harvest was done to complete procedure. Cardiothoracic surgery requested 72 hours of antibiotic prophylaxis given increased risk of deep sternal wound infection with bilateral mammary artery harvest secondary to decreased blood flow to the sternum.      Other antibiotics: None     Allergies: Patient has no known allergies.      List concerns for renal function: Age, recent bypass, fluid overload, metabolic acidosis     Pertinent cultures to date:   Nasal swab     MRSA negative (MSSA +)    Recent Labs      10/23/18   1402  10/25/18   0500   WBC  7.2  14.7*   NEUTSPOLYS  59.30   --      Recent Labs      10/23/18   1402  10/25/18   0500   BUN  22  18   CREATININE  0.84  0.70   ALBUMIN  4.7   --      No results for input(s): VANCOTROUGH, VANCOPEAK, VANCORANDOM in the last 72 hours.  Intake/Output Summary (Last 24 hours) at 10/25/18 1314  Last data filed at 10/25/18 0600   Gross per 24 hour   Intake          9096.39 ml   Output             3925 ml   Net          5171.39 ml      Pulse 92, temperature 37.4 °C (99.3 °F), resp. rate (!) 27, height 1.753 m (5' 9\"), weight 96.9 kg (213 lb 10 oz), SpO2 90 %. Temp (24hrs), Av.3 °C (99.2 °F), Min:36.7 °C (98.1 °F), Max:37.5 °C (99.5 °F)      A/P   1. Vancomycin dose change: Not indicated  2. Next vancomycin level: Not indicated  3. Goal trough: 16-20 mcg/mL  4. Comments: Patient extubated. No significant change in renal indices or urine output. Urine output adequate as expected. Vancomycin prophylaxis continues.     Rigoberto Mas, PharmD      "

## 2018-10-25 NOTE — PROGRESS NOTES
"Pharmacy Kinetics 63 y.o. male on vancomycin day # 1 10/24/2018    Currently on Vancomycin 1500 mg iv q12hr (1130, 2330)    Indication for Treatment: surgical site prophylaxis    Pertinent history per medical record: Admitted on 10/24/2018 for CABG. Bilateral mammary artery harvest was done to complete procedure. Cardiothoracic surgery requested 72 hours of antibiotic prophylaxis given increased risk of deep sternal wound infection with bilateral mammary artery harvest secondary to decreased blood flow to the sternum.     Other antibiotics: None    Allergies: Patient has no known allergies.     List concerns for renal function: Age, recent bypass, fluid overload, metabolic acidosis    Pertinent cultures to date:   Nasal swab MRSA negative (MSSA +)    Recent Labs      10/23/18   1402   WBC  7.2   NEUTSPOLYS  59.30     Recent Labs      10/23/18   1402   BUN  22   CREATININE  0.84   ALBUMIN  4.7     No results for input(s): VANCOTROUGH, VANCOPEAK, VANCORANDOM in the last 72 hours.  Intake/Output Summary (Last 24 hours) at 10/24/18 1709  Last data filed at 10/24/18 1500   Gross per 24 hour   Intake          5458.76 ml   Output             1565 ml   Net          3893.76 ml      Pulse 84, temperature 36.5 °C (97.7 °F), resp. rate 19, height 1.753 m (5' 9\"), weight 96.9 kg (213 lb 10 oz), SpO2 96 %. Temp (24hrs), Av.7 °C (98 °F), Min:36.5 °C (97.7 °F), Max:36.8 °C (98.3 °F)      A/P   1. Vancomycin dose change: 1500 mg IV q12h   2. Next vancomycin level: Not indicated  3. Goal trough: 16-20 mcg/mL  4. Comments: 72 hour stop ordered of prophylactic vancomycin. Patient positive for MSSA on nasal swab. Cefazolin provides more benefit in treatment of MSSA than vancomycin, however CT surgery would like vancomycin for a prolonged prophylactic period given the increased risk of sternal wound infection. Renal indices currently appropriate. Dose reduce to once daily if GFR drops. Good urine output out of surgery.     Rigoberto FERNANDEZ" Tg, PharmD

## 2018-10-25 NOTE — PROGRESS NOTES
Critical Care Progress Note    Date of admission  10/24/2018    Chief Complaint  63 y.o. male admitted 10/24/2018 with multivessel cad    Hospital Course    63-year-old gentleman with known past   medical history of chronic hypertension, dyslipidemia, chronic back pain, and   recently diagnosed multivessel coronary artery disease on cardiac   catheterization on 08/29/2018.  Patient admitted today for elective triple   vessel bypass.  ____ time recovering from anesthesia on full mechanical   ventilatory support with infusing epinephrine, insulin and Precedex infusions.    No intraoperative complications noted.  Patient did have bilateral mammary   artery harvest.      Interval Problem Update  Reviewed last 24 hour events:  Tm 99.5  +6.9L over last 24hr  cxr with bb atx  K 4.1  Ca 7.4 (alb 10/23 4.7)    Precedex @ 0.1  Epinephrine @ 0.04  Insulin @ 3      Review of Systems  Review of Systems   Constitutional: Negative for chills and fever.   HENT: Negative for congestion and sore throat.    Respiratory: Positive for cough and sputum production. Negative for shortness of breath.    Cardiovascular: Positive for chest pain. Negative for palpitations.   Gastrointestinal: Negative for abdominal pain, nausea and vomiting.   Psychiatric/Behavioral: Negative for depression.   All other systems reviewed and are negative.       Vital Signs for last 24 hours   Temp:  [36.5 °C (97.7 °F)-37.5 °C (99.5 °F)] 37.3 °C (99.1 °F)  Pulse:  [] 105  Resp:  [12-30] 22    Hemodynamic parameters for last 24 hours  CVP:  [8 MM HG-15 MM HG] 12 MM HG  PCWP:  [11 MM HG-22 MM HG] 11 MM HG  CO:  [5.6-8.6] 8.6  CI:  [2.6-4.1] 4.1    Respiratory  Adams Vent Mode: Spont  PEEP/CPAP: 8  FiO2: 30  P Support: 5  P MEAN: 10  Control VTE (exp VT): 591    Physical Exam   Physical Exam   Constitutional: He appears well-developed and well-nourished.   HENT:   Head: Normocephalic and atraumatic.   Eyes: Pupils are equal, round, and reactive to light.  No scleral icterus.   Neck: No tracheal deviation present.   Cardiovascular: Intact distal pulses.    Tachy rate   Pulmonary/Chest: He has no wheezes. He has rales.   Abdominal: Soft. He exhibits no distension. There is no tenderness.   Musculoskeletal: He exhibits edema.   Neurological: No cranial nerve deficit.   Skin: Skin is warm and dry.   Psychiatric: He has a normal mood and affect.   Nursing note and vitals reviewed.      Medications  Current Facility-Administered Medications   Medication Dose Route Frequency Provider Last Rate Last Dose   • EPINEPHrine 4 mg in  mL Infusion  0-0.2 mcg/kg/min Intravenous Continuous Emely Hernandez, A.P.N. 14.5 mL/hr at 10/25/18 0427 0.04 mcg/kg/min at 10/25/18 0427   • insulin lispro (HUMALOG) injection 0-10 Units  0-10 Units Subcutaneous ACHS & 0200 Lilian Arias, A.P.N.       • potassium chloride (KCL) ivpb 10 mEq  10 mEq Intravenous Once Lilian Arias A.P.N. 100 mL/hr at 10/25/18 0643 10 mEq at 10/25/18 0643   • insulin NPH (HUMULIN,NOVOLIN) injection 15 Units  15 Units Subcutaneous Q8HRS Lilian Arias, A.P.N.       • insulin lispro (HUMALOG) injection 10 Units  10 Units Subcutaneous TID AC Lilian Arias, A.P.N.       • insulin lispro (HUMALOG) injection 6 Units  6 Units Subcutaneous TID AC Lilian Arias, A.P.N.       • insulin lispro (HUMALOG) injection 0-20 Units  0-20 Units Subcutaneous ACHS & 0200 Lilian Arias, A.P.N.       • atorvastatin (LIPITOR) tablet 80 mg  80 mg Oral DAILY Emely Hernandez, A.P.N.   80 mg at 10/25/18 0524   • cyclobenzaprine (FLEXERIL) tablet 5 mg  5 mg Oral TID PRN Emely Hernandez A.P.N.   5 mg at 10/24/18 1924   • gabapentin (NEURONTIN) capsule 100 mg  100 mg Oral TID Emely Hernandez, A.P.N.   100 mg at 10/25/18 0524   • Respiratory Care per Protocol   Nebulization Continuous RT Emely Hernandez, A.P.N.       • NS infusion   Intravenous Continuous KRISTEN GarveyP.N. 10 mL/hr at 10/24/18 1300     • Pharmacy Consult Request  ...Pain Management Review 1 Each  1 Each Other PRN Emely Hernandez, A.P.N.       • senna-docusate (PERICOLACE or SENOKOT S) 8.6-50 MG per tablet 2 Tab  2 Tab Oral BID Emely Hernandez A.P.N.   2 Tab at 10/25/18 0524    And   • polyethylene glycol/lytes (MIRALAX) PACKET 1 Packet  1 Packet Oral QDAY PRN Emely Hernandez A.P.N.        And   • magnesium hydroxide (MILK OF MAGNESIA) suspension 30 mL  30 mL Oral QDAY PRN Emely Hernandez, A.P.N.        And   • bisacodyl (DULCOLAX) suppository 10 mg  10 mg Rectal QDAY PRN Emely Hernandez A.P.N.       • electrolyte-A (PLASMALYTE-A) infusion   Intravenous PRN Emely Hernandez, A.P.N.       • enoxaparin (LOVENOX) inj 40 mg  40 mg Subcutaneous QDAY Emely Hernandez, A.P.N.       • mupirocin (BACTROBAN) 2 % ointment 1 Application  1 Application Topical BID Emely Hernandez, A.P.N.   1 Application at 10/25/18 0525   • potassium chloride SA (Kdur) tablet 10 mEq  10 mEq Oral BID Emely Hernandez A.P.N.        Or   • potassium chloride (KCL) ivpb 10 mEq  10 mEq Intravenous BID Emely Hernandez, A.P.N.       • Magnesium Sulfate in D5W IVPB premix 1 g  1 g Intravenous QDAY Emely Hernandez A.P.N.   Stopped at 10/24/18 1514   • metoprolol (LOPRESSOR) tablet 12.5 mg  12.5 mg Oral BID Emely Hernandez, A.P.N.   Stopped at 10/25/18 0600    Followed by   • [START ON 10/26/2018] metoprolol (LOPRESSOR) tablet 25 mg  25 mg Oral BID Emely Hernandez A.P.N.       • aspirin EC (ECOTRIN) tablet 81 mg  81 mg Oral DAILY Emely Hernandez, A.P.N.       • clopidogrel (PLAVIX) tablet 75 mg  75 mg Oral DAILY Emely Hernandez A.P.N.       • clevidipine (CLEVIPREX) IV emulsion  1-21 mg/hr Intravenous Continuous Emely Hernandez, A.P.N.   Stopped at 10/24/18 1300   • nitroglycerin 50 mg in D5W 250 ml infusion  0-100 mcg/min Intravenous Continuous Emely Hernandez A.P.N.   Stopped at 10/24/18 1300   • oxyCODONE immediate-release (ROXICODONE) tablet 5 mg  5 mg Oral Q3HRS PRN Emely Hernandez A.P.N.   5 mg at 10/24/18 1926   •  oxyCODONE immediate release (ROXICODONE) tablet 10 mg  10 mg Oral Q3HRS PRN Emely Hernandez, A.P.N.   10 mg at 10/25/18 0642   • fentaNYL (SUBLIMAZE) injection 50 mcg  50 mcg Intravenous Q3HRS PRN Emely Hernandez, A.P.N.       • tramadol (ULTRAM) 50 MG tablet 50 mg  50 mg Oral Q4HRS PRN Emely Hernandez, A.P.N.   50 mg at 10/25/18 0442   • dexmedetomidine (PRECEDEX) 400 mcg/100mL NS premix infusion  0-1.5 mcg/kg/hr Intravenous Continuous Emely Hernandez, A.P.N. 2.4 mL/hr at 10/25/18 0510 0.1 mcg/kg/hr at 10/25/18 0510   • ondansetron (ZOFRAN) syringe/vial injection 4 mg  4 mg Intravenous Q6HRS PRN Emely Hernandez, A.P.N.        Or   • prochlorperazine (COMPAZINE) injection 10 mg  10 mg Intravenous Q6HRS PRN Emely Hernandez, A.P.N.        Or   • promethazine (PHENERGAN) suppository 25 mg  25 mg Rectal Q6HRS PRN Emely Hernandez, A.P.N.       • acetaminophen (TYLENOL) tablet 650 mg  650 mg Oral Q4HRS PRN Emely Hernandez, A.P.N.        Or   • acetaminophen (TYLENOL) suppository 650 mg  650 mg Rectal Q4HRS PRN Emely Hernandez, A.P.N.       • mag hydrox-al hydrox-simeth (MAALOX PLUS ES or MYLANTA DS) suspension 30 mL  30 mL Oral Q4HRS PRN Emely Hernandez, A.P.N.       • diphenhydrAMINE (BENADRYL) tablet/capsule 25 mg  25 mg Oral HS PRN - MR X 1 Emely Hernandez, A.P.N.       • insulin regular human (HUMULIN/NOVOLIN R) 62.5 Units in  mL infusion per protocol  1-6 Units/hr Intravenous Continuous Emely Hernandez, A.P.N. 12 mL/hr at 10/25/18 0719 3 Units/hr at 10/25/18 0719    And   • insulin regular (HUMULIN R) injection 0-10 Units  0-10 Units Intravenous PRN KRISTEN GarveyPCristopherN.   1 Units at 10/25/18 0619    And   • dextrose 50% (D50W) injection 25 mL  25 mL Intravenous PRN TRUE Garvey.       • MD Alert...Vancomycin per Pharmacy   Other pharmacy to dose Berhane Canela M.D.       • sodium acetate 50 mEq in sterile water 250 mL IV bolus  50 mEq Intravenous Q HOUR PRN Berhane Canela M.D.   Stopped at 10/24/18 2478    • vancomycin 1,500 mg in  mL IVPB  1.5 g Intravenous Q12HR GRACIE Garvey   Stopped at 10/25/18 0117       Fluids    Intake/Output Summary (Last 24 hours) at 10/25/18 0737  Last data filed at 10/25/18 0500   Gross per 24 hour   Intake         43682.37 ml   Output             4745 ml   Net          6947.37 ml       Laboratory  Recent Labs      10/24/18   1549  10/24/18   1644  10/24/18   1738   ISTATAPH  7.276*  7.285*  7.330*   ISTATAPCO2  38.6*  38.3*  35.6   ISTATAPO2  83  83  89*   ISTATATCO2  19*  19*  20   [id number]  95  95  96   ISTATARTHCO3  18.0  18.2  18.8   ISTATARTBE  -8*  -8*  -6*   ISTATTEMP  36.7 C  36.9 C  36.8 C   ISTATFIO2  30  30  30   ISTATSPEC  Arterial  Arterial  Arterial   ISTATAPHTC  7.280*  7.287*  7.333*   [id number]  82  82  88*         Recent Labs      10/23/18   1402  10/24/18   1159  10/24/18   1253  10/24/18   1740  10/25/18   0500   SODIUM  136   --    --    --   136   POTASSIUM  3.8  3.4*   --   3.6  4.1   CHLORIDE  101   --    --    --   104   CO2  26   --    --    --   22   BUN  22   --    --    --   18   CREATININE  0.84   --    --    --   0.70   MAGNESIUM   --    --   1.8   --    --    CALCIUM  10.0   --    --    --   7.4*     Recent Labs      10/23/18   1402  10/25/18   0500   ALTSGPT  53*   --    ASTSGOT  34   --    ALKPHOSPHAT  54   --    TBILIRUBIN  1.3   --    GLUCOSE  114*  143*     Recent Labs      10/23/18   1402   WBC  7.2   NEUTSPOLYS  59.30   LYMPHOCYTES  27.50   MONOCYTES  9.90   EOSINOPHILS  2.40   BASOPHILS  0.60   ASTSGOT  34   ALTSGPT  53*   ALKPHOSPHAT  54   TBILIRUBIN  1.3     Recent Labs      10/23/18   1402  10/24/18   1253   RBC  4.57*   --    HEMOGLOBIN  16.2   --    HEMATOCRIT  44.2   --    PLATELETCT  192  141*   PROTHROMBTM  14.1  19.5*   APTT  26.8  28.8   INR  1.08  1.64*       Imaging  X-Ray:  I have personally reviewed the images and compared with prior images.    Assessment/Plan  Hypocalcemia   Assessment & Plan    Give 1gram  now  Check iCa in a.m.        Acute respiratory failure with hypoxia (HCC)   Assessment & Plan    Intubated 10/24-24  Continue rt/02 protocols  Is/pep/mobilize  Start diureses soon        Coronary artery disease of native artery of native heart with stable angina pectoris (HCC)- (present on admission)   Assessment & Plan    S/P 3V Cabg 10/24  Continue post heart protocols  Active titration of epinephrine to maintain adequate CO and systemic perfusion  Give 1 gram CaCl        Dyslipidemia- (present on admission)   Assessment & Plan    statin             VTE:  Lovenox  Ulcer: Not Indicated  Lines: Central Line  Ongoing indication addressed    I have performed a physical exam and reviewed and updated ROS and Plan today (10/25/2018). In review of yesterday's note (10/24/2018), there are no changes except as documented above.     Discussed patient condition and risk of morbidity and/or mortality with RN, RT, Therapies, Pharmacy, Patient and [facility]  The patient remains critically ill.  Critical care time = 35 minutes in directly providing and coordinating critical care and extensive data review.  No time overlap and excludes procedures.

## 2018-10-25 NOTE — PROGRESS NOTES
MS:  SR [id number] with rare PVCs  0.16/0.08/0.38    12 hour chart check    2 RN admission skin check completed with Kusum ANGEL No abnormalities noted besides surgical wounds.

## 2018-10-25 NOTE — PROGRESS NOTES
Pt arrived to [id number] at 1245. Bedside report received from Dr. Mendoza. Dr. Canela at bedside as well. VS and medication documented. Family updated in waiting room.

## 2018-10-25 NOTE — CARE PLAN
Problem: Hyperinflation:  Goal: Prevent or improve atelectasis  Outcome: PROGRESSING AS EXPECTED  PEP QID, Pt. Attaining volumes of 1250 on IS

## 2018-10-25 NOTE — PROGRESS NOTES
Updated APRN White regarding ABG values, pressors, UO, and ventilation parameters. Orders received to extubate after one time dose of sodium acetate, if PMA agrees with extubation. Dr. Smith updated, agreeable to extubation as well and orders received. APRN also updated regarding BP cuff and arterial pressure not correlating. Orders received to titrate medication based on cuff pressure.

## 2018-10-26 PROBLEM — E87.1 HYPONATREMIA: Status: ACTIVE | Noted: 2018-10-26

## 2018-10-26 LAB
ANION GAP SERPL CALC-SCNC: 5 MMOL/L (ref 0–11.9)
BUN SERPL-MCNC: 17 MG/DL (ref 8–22)
CA-I SERPL-SCNC: 1.1 MMOL/L (ref 1.1–1.3)
CALCIUM SERPL-MCNC: 8.2 MG/DL (ref 8.5–10.5)
CHLORIDE SERPL-SCNC: 95 MMOL/L (ref 96–112)
CO2 SERPL-SCNC: 26 MMOL/L (ref 20–33)
CREAT SERPL-MCNC: 0.63 MG/DL (ref 0.5–1.4)
EKG IMPRESSION: NORMAL
ERYTHROCYTE [DISTWIDTH] IN BLOOD BY AUTOMATED COUNT: 43.8 FL (ref 35.9–50)
GLUCOSE SERPL-MCNC: 138 MG/DL (ref 65–99)
HCT VFR BLD AUTO: 31.9 % (ref 42–52)
HGB BLD-MCNC: 11.1 G/DL (ref 14–18)
MAGNESIUM SERPL-MCNC: 1.8 MG/DL (ref 1.5–2.5)
MCH RBC QN AUTO: 34.4 PG (ref 27–33)
MCHC RBC AUTO-ENTMCNC: 34.8 G/DL (ref 33.7–35.3)
MCV RBC AUTO: 98.8 FL (ref 81.4–97.8)
PLATELET # BLD AUTO: 115 K/UL (ref 164–446)
PMV BLD AUTO: 9.4 FL (ref 9–12.9)
POTASSIUM SERPL-SCNC: 4.6 MMOL/L (ref 3.6–5.5)
RBC # BLD AUTO: 3.23 M/UL (ref 4.7–6.1)
SODIUM SERPL-SCNC: 126 MMOL/L (ref 135–145)
WBC # BLD AUTO: 15.2 K/UL (ref 4.8–10.8)

## 2018-10-26 PROCEDURE — 99233 SBSQ HOSP IP/OBS HIGH 50: CPT | Performed by: INTERNAL MEDICINE

## 2018-10-26 PROCEDURE — G8988 SELF CARE GOAL STATUS: HCPCS | Mod: CI

## 2018-10-26 PROCEDURE — 82330 ASSAY OF CALCIUM: CPT

## 2018-10-26 PROCEDURE — G8987 SELF CARE CURRENT STATUS: HCPCS | Mod: CJ

## 2018-10-26 PROCEDURE — 700101 HCHG RX REV CODE 250

## 2018-10-26 PROCEDURE — 97162 PT EVAL MOD COMPLEX 30 MIN: CPT

## 2018-10-26 PROCEDURE — 94640 AIRWAY INHALATION TREATMENT: CPT

## 2018-10-26 PROCEDURE — 700102 HCHG RX REV CODE 250 W/ 637 OVERRIDE(OP): Performed by: CLINICAL NURSE SPECIALIST

## 2018-10-26 PROCEDURE — 700105 HCHG RX REV CODE 258

## 2018-10-26 PROCEDURE — 700111 HCHG RX REV CODE 636 W/ 250 OVERRIDE (IP): Performed by: CLINICAL NURSE SPECIALIST

## 2018-10-26 PROCEDURE — 770020 HCHG ROOM/CARE - TELE (206)

## 2018-10-26 PROCEDURE — 80048 BASIC METABOLIC PNL TOTAL CA: CPT

## 2018-10-26 PROCEDURE — A9270 NON-COVERED ITEM OR SERVICE: HCPCS | Performed by: CLINICAL NURSE SPECIALIST

## 2018-10-26 PROCEDURE — 83735 ASSAY OF MAGNESIUM: CPT

## 2018-10-26 PROCEDURE — 94668 MNPJ CHEST WALL SBSQ: CPT

## 2018-10-26 PROCEDURE — 93005 ELECTROCARDIOGRAM TRACING: CPT | Performed by: CLINICAL NURSE SPECIALIST

## 2018-10-26 PROCEDURE — G8978 MOBILITY CURRENT STATUS: HCPCS | Mod: CK

## 2018-10-26 PROCEDURE — 700105 HCHG RX REV CODE 258: Performed by: CLINICAL NURSE SPECIALIST

## 2018-10-26 PROCEDURE — 97166 OT EVAL MOD COMPLEX 45 MIN: CPT

## 2018-10-26 PROCEDURE — 85027 COMPLETE CBC AUTOMATED: CPT

## 2018-10-26 PROCEDURE — 93010 ELECTROCARDIOGRAM REPORT: CPT | Performed by: INTERNAL MEDICINE

## 2018-10-26 PROCEDURE — G8979 MOBILITY GOAL STATUS: HCPCS | Mod: CI

## 2018-10-26 PROCEDURE — 700111 HCHG RX REV CODE 636 W/ 250 OVERRIDE (IP): Performed by: INTERNAL MEDICINE

## 2018-10-26 RX ORDER — FUROSEMIDE 10 MG/ML
20 INJECTION INTRAMUSCULAR; INTRAVENOUS
Status: DISCONTINUED | OUTPATIENT
Start: 2018-10-26 | End: 2018-10-29 | Stop reason: HOSPADM

## 2018-10-26 RX ORDER — SODIUM CHLORIDE 9 MG/ML
INJECTION, SOLUTION INTRAVENOUS
Status: COMPLETED
Start: 2018-10-26 | End: 2018-10-26

## 2018-10-26 RX ADMIN — Medication 2.5 MG: at 06:10

## 2018-10-26 RX ADMIN — SODIUM CHLORIDE: 9 INJECTION, SOLUTION INTRAVENOUS at 12:45

## 2018-10-26 RX ADMIN — METOPROLOL TARTRATE 25 MG: 25 TABLET, FILM COATED ORAL at 17:20

## 2018-10-26 RX ADMIN — FUROSEMIDE 20 MG: 10 INJECTION, SOLUTION INTRAMUSCULAR; INTRAVENOUS at 11:24

## 2018-10-26 RX ADMIN — OXYCODONE HYDROCHLORIDE 10 MG: 10 TABLET ORAL at 07:04

## 2018-10-26 RX ADMIN — ALBUTEROL SULFATE 2.5 MG: 2.5 SOLUTION RESPIRATORY (INHALATION) at 06:10

## 2018-10-26 RX ADMIN — SENNOSIDES AND DOCUSATE SODIUM 2 TABLET: 8.6; 5 TABLET ORAL at 04:24

## 2018-10-26 RX ADMIN — VANCOMYCIN HYDROCHLORIDE 1500 MG: 100 INJECTION, POWDER, LYOPHILIZED, FOR SOLUTION INTRAVENOUS at 14:33

## 2018-10-26 RX ADMIN — ONDANSETRON HYDROCHLORIDE 4 MG: 2 INJECTION INTRAMUSCULAR; INTRAVENOUS at 17:16

## 2018-10-26 RX ADMIN — MUPIROCIN 1 APPLICATION: 20 OINTMENT TOPICAL at 06:00

## 2018-10-26 RX ADMIN — METOPROLOL TARTRATE 25 MG: 25 TABLET, FILM COATED ORAL at 04:26

## 2018-10-26 RX ADMIN — ATORVASTATIN CALCIUM 80 MG: 20 TABLET, FILM COATED ORAL at 04:22

## 2018-10-26 RX ADMIN — POTASSIUM CHLORIDE 10 MEQ: 1500 TABLET, EXTENDED RELEASE ORAL at 17:18

## 2018-10-26 RX ADMIN — ONDANSETRON HYDROCHLORIDE 4 MG: 2 INJECTION INTRAMUSCULAR; INTRAVENOUS at 08:46

## 2018-10-26 RX ADMIN — GABAPENTIN 100 MG: 100 CAPSULE ORAL at 17:18

## 2018-10-26 RX ADMIN — OXYCODONE HYDROCHLORIDE 10 MG: 10 TABLET ORAL at 03:33

## 2018-10-26 RX ADMIN — GABAPENTIN 100 MG: 100 CAPSULE ORAL at 11:24

## 2018-10-26 RX ADMIN — SENNOSIDES AND DOCUSATE SODIUM 2 TABLET: 8.6; 5 TABLET ORAL at 17:18

## 2018-10-26 RX ADMIN — OXYCODONE HYDROCHLORIDE 10 MG: 10 TABLET ORAL at 10:03

## 2018-10-26 RX ADMIN — ASPIRIN 81 MG: 81 TABLET, COATED ORAL at 04:23

## 2018-10-26 RX ADMIN — POTASSIUM CHLORIDE 10 MEQ: 1500 TABLET, EXTENDED RELEASE ORAL at 04:26

## 2018-10-26 RX ADMIN — GABAPENTIN 100 MG: 100 CAPSULE ORAL at 04:23

## 2018-10-26 RX ADMIN — ENOXAPARIN SODIUM 40 MG: 100 INJECTION SUBCUTANEOUS at 17:23

## 2018-10-26 RX ADMIN — OXYCODONE HYDROCHLORIDE 10 MG: 10 TABLET ORAL at 17:18

## 2018-10-26 RX ADMIN — CLOPIDOGREL 75 MG: 75 TABLET, FILM COATED ORAL at 04:23

## 2018-10-26 RX ADMIN — MUPIROCIN 1 APPLICATION: 20 OINTMENT TOPICAL at 17:23

## 2018-10-26 RX ADMIN — MAGNESIUM SULFATE 1 G: 1 INJECTION INTRAVENOUS at 12:41

## 2018-10-26 RX ADMIN — OXYCODONE HYDROCHLORIDE 10 MG: 10 TABLET ORAL at 21:21

## 2018-10-26 ASSESSMENT — COGNITIVE AND FUNCTIONAL STATUS - GENERAL
DRESSING REGULAR UPPER BODY CLOTHING: A LITTLE
STANDING UP FROM CHAIR USING ARMS: A LITTLE
PERSONAL GROOMING: A LITTLE
CLIMB 3 TO 5 STEPS WITH RAILING: A LITTLE
MOBILITY SCORE: 17
WALKING IN HOSPITAL ROOM: A LITTLE
MOVING TO AND FROM BED TO CHAIR: A LITTLE
TOILETING: A LITTLE
TURNING FROM BACK TO SIDE WHILE IN FLAT BAD: A LOT
MOVING FROM LYING ON BACK TO SITTING ON SIDE OF FLAT BED: A LITTLE
HELP NEEDED FOR BATHING: A LITTLE
SUGGESTED CMS G CODE MODIFIER MOBILITY: CK
DRESSING REGULAR LOWER BODY CLOTHING: A LITTLE
DAILY ACTIVITIY SCORE: 19
SUGGESTED CMS G CODE MODIFIER DAILY ACTIVITY: CK

## 2018-10-26 ASSESSMENT — GAIT ASSESSMENTS
DEVIATION: OTHER (COMMENT)
DISTANCE (FEET): 80
GAIT LEVEL OF ASSIST: SUPERVISED
ASSISTIVE DEVICE: WHEELCHAIR PUSH

## 2018-10-26 ASSESSMENT — PAIN SCALES - GENERAL
PAINLEVEL_OUTOF10: 4
PAINLEVEL_OUTOF10: 8
PAINLEVEL_OUTOF10: 4
PAINLEVEL_OUTOF10: 2
PAINLEVEL_OUTOF10: 0
PAINLEVEL_OUTOF10: 6
PAINLEVEL_OUTOF10: 4
PAINLEVEL_OUTOF10: 2
PAINLEVEL_OUTOF10: 8
PAINLEVEL_OUTOF10: 0

## 2018-10-26 ASSESSMENT — ENCOUNTER SYMPTOMS
PALPITATIONS: 0
SHORTNESS OF BREATH: 0
CONSTITUTIONAL NEGATIVE: 1
ABDOMINAL PAIN: 0
SORE THROAT: 0
FEVER: 0
CARDIOVASCULAR NEGATIVE: 1
COUGH: 1
PSYCHIATRIC NEGATIVE: 1
CHILLS: 0
NEUROLOGICAL NEGATIVE: 1
MUSCULOSKELETAL NEGATIVE: 1
SPUTUM PRODUCTION: 1
EYES NEGATIVE: 1
RESPIRATORY NEGATIVE: 1
GASTROINTESTINAL NEGATIVE: 1
NAUSEA: 0
VOMITING: 0
DEPRESSION: 0

## 2018-10-26 ASSESSMENT — ACTIVITIES OF DAILY LIVING (ADL): TOILETING: INDEPENDENT

## 2018-10-26 NOTE — THERAPY
"Physical Therapy Evaluation completed.   Bed Mobility:  Supine to Sit:  (in chair upon entry and exit)  Transfers: Sit to Stand: Stand by Assist  Gait: Level Of Assist: Supervised with walk behind WC       Plan of Care: Will benefit from Physical Therapy 3 times total  Discharge Recommendations: Equipment: Will Continue to Assess for Equipment Needs. Post-acute therapy: see below.    Patient presented s/p CABG x3 10/24 with Dr. Canela, normal EF. Patient ambulated approximately 80ft before positive talk test, returned to room in Staxi chair due to elevated HR, HR returned to 90s with seated rest in room. Patient with return demo of sternal precautions, activity pacing and monitoring via talk test/RPE/HR response, walking program and return to activity. Patient will benefit from 1-2 more sessions as able during hospitalization to reinforce cardiac education and address any questions. Please refer to outpatient cardiac rehab once appropriate.    See \"Rehab Therapy-Acute\" Patient Summary Report for complete documentation.     "

## 2018-10-26 NOTE — CARE PLAN
Problem: Post Op Day 1 CABG/Heart Valve Replacement  Goal: Optimal care of the post op CABG/heart valve replacement Post Op Day 1    Intervention: EKG and CXR completed  Completed  Intervention: All valve patients: PT/INR daily  N/A  Intervention: Daily Weights  Completed  Intervention: Up in chair for all meals  Pt up in chair for most of shift. Pt tolerated well  Intervention: Ambulate in am if stable. First ambulation is 25 feet. Repeat x 3 as tolerated.  Ambulate again before bed.  Completed  Intervention: Remove original surgical dressing after 24 hrs, leave open to air unless otherwise specified by physician  Prevena in place    Intervention: Consider chest tube removal by MD  N/A  Intervention: IS q 1 hour while awake and record best IS volume  Completed. IS 1250  Intervention: Graduated elastic stockings  Completed  Intervention: Saline lock IV  Completed  Intervention: Transfer to tele status, begin VS q 4 hours  Completed. Pt tele status   Intervention: After 24th hour post-anesthesia end time, transition patient to Cardiac Surgery SQ Insulin Protocol  Completed  Intervention: If patient is CABG or on home beta-blocker, start Beta-Blocker on POD 1 or POD 2 or contraindication documented  Completed. Pt on metoprolol

## 2018-10-26 NOTE — RESPIRATORY CARE
COPD EDUCATION by COPD CLINICAL EDUCATOR  10/26/2018 at 1:05 PM by Vida Cali     Patient reviewed by COPD education team. Patient does not qualify for COPD program.

## 2018-10-26 NOTE — PROGRESS NOTES
"Pharmacy Kinetics 63 y.o. male on vancomycin day # 3 10/26/2018    Currently on Vancomycin 1500 mg iv q12hr  Other antibiotics: none    Indication for Treatment: surgical site prophylaxis    Pertinent history per medical record: Admitted on 10/24/2018 for CABG. Bilateral mammary artery harvest was done to complete procedure. Cardiothoracic surgery requested 72 hours of antibiotic prophylaxis given increased risk of deep sternal wound infection with bilateral mammary artery harvest secondary to decreased blood flow to the sternum. .    Allergies: Patient has no known allergies.     List concerns for renal function: age    Pertinent cultures to date:   Nasal swab positive for MSSA    Recent Labs      10/23/18   1402  10/25/18   0500  10/26/18   0430   WBC  7.2  14.7*  15.2*   NEUTSPOLYS  59.30   --    --      Recent Labs      10/23/18   1402  10/25/18   0500  10/26/18   0430   BUN  22  18  17   CREATININE  0.84  0.70  0.63   ALBUMIN  4.7   --    --      No results for input(s): VANCOTROUGH, VANCOPEAK, VANCORANDOM in the last 72 hours.  Intake/Output Summary (Last 24 hours) at 10/26/18 0753  Last data filed at 10/26/18 0600   Gross per 24 hour   Intake          4668.63 ml   Output             2180 ml   Net          2488.63 ml      Pulse 90, temperature 37.5 °C (99.5 °F), resp. rate 14, height 1.753 m (5' 9\"), weight 108.9 kg (240 lb 1.3 oz), SpO2 95 %. Temp (24hrs), Av.4 °C (99.4 °F), Min:37.4 °C (99.3 °F), Max:37.5 °C (99.5 °F)      A/P   1. Vancomycin dose change: no change  2. Next vancomycin level: not needed  3. Goal trough: 12-16   4. Comments: 72 hours of prophylaxis with vanco will complete today.      Neeraj Ly, PharmD, BCPS, BCCCP      "

## 2018-10-26 NOTE — PROGRESS NOTES
Cardiovascular Surgery Progress Note    Name: Santi Landers  MRN: [id number]  : 1955  Admit Date: 10/24/2018  5:05 AM  Procedure:  Procedure(s) and Anesthesia Type:     * MULTIPLE CORONARY ARTERY BYPASS x 3 ENDO VEIN HARVEST- RIGHT LEG, BILATERAL ANTERIOR MAMMARY ARTERY HARVEST - General     * LORENA - General  2 Day Post-Op    Vitals:  Patient Vitals for the past 8 hrs:   Temp SpO2 O2 Delivery O2 (LPM) Pulse Heart Rate (Monitored) Resp NIBP   10/26/18 0700 (P) 36.8 °C (98.2 °F) (P) 92 % - - (P) 87 (P) 87 (P) 15 (P) 123/76   10/26/18 0610 - 95 % - 1 90 89 14 -   10/26/18 0603 - 94 % - - 91 91 (!) 26 -   10/26/18 0600 - 93 % Silicone Nasal Cannula 1 90 - (!) 23 129/78     Temp (24hrs), Av.2 °C (99 °F), Min:36.8 °C (98.2 °F), Max:37.5 °C (99.5 °F)      Respiratory:    Respiration: (P) 15, Pulse Oximetry: (P) 92 %, O2 Daily Delivery Respiratory : Silicone Nasal Cannula     Chest Tube Drains:          Fluids:    Intake/Output Summary (Last 24 hours) at 10/26/18 1052  Last data filed at 10/26/18 1000   Gross per 24 hour   Intake          1822.43 ml   Output             2075 ml   Net          -252.57 ml     Admit weight: Weight: 96.9 kg (213 lb 10 oz)  Current weight: Weight: 108.9 kg (240 lb 1.3 oz) (10/25/18 1600)    Labs:  Recent Labs      10/23/18   1402  10/24/18   1253  10/25/18   0500  10/26/18   0430   WBC  7.2   --   14.7*  15.2*   RBC  4.57*   --   3.27*  3.23*   HEMOGLOBIN  16.2   --   11.8*  11.1*   HEMATOCRIT  44.2   --   31.9*  31.9*   MCV  96.7   --   96.9  98.8*   MCH  35.4*   --   35.2*  34.4*   MCHC  36.7*   --   36.3*  34.8   RDW  42.2   --   42.5  43.8   PLATELETCT  192  141*  142*  115*   MPV  8.9*   --   9.1  9.4     Recent Labs      10/23/18   1402   NEUTSPOLYS  59.30   LYMPHOCYTES  27.50   MONOCYTES  9.90   EOSINOPHILS  2.40   BASOPHILS  0.60     Recent Labs      10/23/18   1402   10/24/18   1740  10/25/18   0500  10/26/18   0430   SODIUM  136   --    --   136  126*   POTASSIUM  3.8   < >   3.6  4.1  4.6   CHLORIDE  101   --    --   104  95*   CO2  26   --    --   22  26   GLUCOSE  114*   --    --   143*  138*   BUN  22   --    --   18  17   CREATININE  0.84   --    --   0.70  0.63   CALCIUM  10.0   --    --   7.4*  8.2*    < > = values in this interval not displayed.     Recent Labs      10/23/18   1402  10/24/18   1253   APTT  26.8  28.8   INR  1.08  1.64*       Medications:  • furosemide  20 mg     • atorvastatin  80 mg     • gabapentin  100 mg     • senna-docusate  2 Tab     • enoxaparin  40 mg     • mupirocin  1 Application     • potassium chloride SA  10 mEq      Or   • potassium chloride  10 mEq     • magnesium sulfate  1 g Stopped (10/25/18 1310)   • metoprolol  25 mg     • aspirin EC  81 mg     • clopidogrel  75 mg     • MD Alert...Vancomycin per Pharmacy       • vancomycin  1.5 g Stopped (10/26/18 0013)        Ordered Medications:    ASA - Yes    Plavix - Yes    Post-operative Beta Blockers - Yes    Ace Inhibitor - No; contraindicated because of Other ef normal    Statin - Yes          Central Line:  Type of line: CVP  Date of insertion: 10/24/2018  Date of removal:     Exam:   Review of Systems   Constitutional: Negative.    HENT: Negative.    Eyes: Negative.    Respiratory: Negative.    Cardiovascular: Negative.    Gastrointestinal: Negative.    Genitourinary: Negative.    Musculoskeletal: Negative.    Skin: Negative.    Neurological: Negative.    Psychiatric/Behavioral: Negative.        Physical Exam   Constitutional: He is oriented to person, place, and time. He appears well-developed and well-nourished.   HENT:   Head: Normocephalic and atraumatic.   Eyes: Pupils are equal, round, and reactive to light.   Neck: Normal range of motion. Neck supple.   Cardiovascular: Normal rate and regular rhythm.    Pulmonary/Chest: Effort normal.   Decreased at bases.    Abdominal: Soft. Bowel sounds are normal.   Musculoskeletal: Normal range of motion.   Neurological: He is alert and oriented to  person, place, and time.   Skin: Skin is warm and dry.   Wounds CDI   Psychiatric: He has a normal mood and affect.       Quality Measures:   Quality-Core Measures   Reviewed items::  EKG reviewed, Radiology images reviewed, Labs reviewed and Medications reviewed  Durán catheter::  No Durán  Central line in place:  Need for access  DVT prophylaxis pharmacological::  Enoxaparin (Lovenox)  DVT prophylaxis - mechanical:  SCDs  Ulcer Prophylaxis::  Yes  Assessed for rehabilitation services:  Patient returned to prior level of function, rehabilitation not indicated at this time      Assessment/Plan:  POD 1 Extubated, HDS, no drips, chest tube output minimal and negative for air leak.  Neuro grossly intact.  Plan:  Transition to tele status.  DC chest tubes.  DC durán.  POD 2 HDS, chest tube output minimal, neuro intact, wounds CDI, abd S/NT.  Plan:  Dc chest tubes, blakes and pacing wires.     Active Hospital Problems    Diagnosis   • Hyponatremia [E87.1]   • Acute respiratory failure with hypoxia (HCC) [J96.01]   • Hypocalcemia [E83.51]   • Coronary artery disease of native artery of native heart with stable angina pectoris (HCC) [I25.118]   • Dyslipidemia [E78.5]

## 2018-10-26 NOTE — PROGRESS NOTES
Critical Care Progress Note    Date of admission  10/24/2018    Chief Complaint  63 y.o. male admitted 10/24/2018 with multivessel cad    Hospital Course    63-year-old gentleman with known past   medical history of chronic hypertension, dyslipidemia, chronic back pain, and   recently diagnosed multivessel coronary artery disease on cardiac   catheterization on 08/29/2018.  Patient admitted today for elective triple   vessel bypass.  ____ time recovering from anesthesia on full mechanical   ventilatory support with infusing epinephrine, insulin and Precedex infusions.    No intraoperative complications noted.  Patient did have bilateral mammary   artery harvest.      Interval Problem Update  Reviewed last 24 hour events:  Tm 99.5  +2.4L over last 24hr, +9.6L since admit  No cxr this am  Na 126  K 4.6  Mg 1.8  ICa 1.1    Precedex @ pause  Epinephrine @ pause    Review of Systems  Review of Systems   Constitutional: Negative for chills and fever.   HENT: Negative for congestion and sore throat.    Respiratory: Positive for cough and sputum production. Negative for shortness of breath.    Cardiovascular: Positive for chest pain. Negative for palpitations.   Gastrointestinal: Negative for abdominal pain, nausea and vomiting.   Psychiatric/Behavioral: Negative for depression.   All other systems reviewed and are negative.       Vital Signs for last 24 hours   Temp:  [37.4 °C (99.3 °F)-37.5 °C (99.5 °F)] 37.5 °C (99.5 °F)  Pulse:  [] 90  Resp:  [14-35] 14    Hemodynamic parameters for last 24 hours  CVP:  [7 MM HG] 7 MM HG  PCWP:  [8 MM HG] 8 MM HG  CO:  [5.8] 5.8  CI:  [2.7] 2.7    Respiratory       Physical Exam   Physical Exam   Constitutional: He appears well-developed and well-nourished.   HENT:   Head: Normocephalic and atraumatic.   Eyes: Pupils are equal, round, and reactive to light. No scleral icterus.   Neck: No tracheal deviation present.   Cardiovascular: Normal rate and intact distal pulses.     Pulmonary/Chest: He has no wheezes. He has rales.   Abdominal: Soft. He exhibits no distension. There is no tenderness.   Musculoskeletal: He exhibits edema.   Neurological: No cranial nerve deficit.   Skin: Skin is warm and dry.   Psychiatric: He has a normal mood and affect.   Nursing note and vitals reviewed.      Medications  Current Facility-Administered Medications   Medication Dose Route Frequency Provider Last Rate Last Dose   • albuterol (PROVENTIL) 2.5mg/0.5ml nebulizer solution 2.5 mg  2.5 mg Nebulization Q4H PRN (RT) Gallo Smith M.D.   2.5 mg at 10/26/18 0610   • EPINEPHrine 4 mg in  mL Infusion  0-0.2 mcg/kg/min Intravenous Continuous Emely Hernandez A.P.N.   Stopped at 10/25/18 1100   • atorvastatin (LIPITOR) tablet 80 mg  80 mg Oral DAILY Emely Hernandez A.P.N.   80 mg at 10/26/18 0422   • cyclobenzaprine (FLEXERIL) tablet 5 mg  5 mg Oral TID PRN Emely Hernandez A.P.N.   5 mg at 10/24/18 1924   • gabapentin (NEURONTIN) capsule 100 mg  100 mg Oral TID Emely Hernandez, A.P.N.   100 mg at 10/26/18 0423   • Respiratory Care per Protocol   Nebulization Continuous RT Emely Hernandez, A.P.N.       • NS infusion   Intravenous Continuous Emely Hernandez A.P.N. 10 mL/hr at 10/24/18 1300     • Pharmacy Consult Request ...Pain Management Review 1 Each  1 Each Other PRN Emely Hernandez A.P.N.       • senna-docusate (PERICOLACE or SENOKOT S) 8.6-50 MG per tablet 2 Tab  2 Tab Oral BID Emely Hernandez A.P.N.   2 Tab at 10/26/18 0424    And   • polyethylene glycol/lytes (MIRALAX) PACKET 1 Packet  1 Packet Oral QDAY PRN Emely Hernandez A.P.N.        And   • magnesium hydroxide (MILK OF MAGNESIA) suspension 30 mL  30 mL Oral QDAY PRN Emely Hernandez A.P.N.        And   • bisacodyl (DULCOLAX) suppository 10 mg  10 mg Rectal QDAY PRN Emely Hernandez, A.P.N.       • electrolyte-A (PLASMALYTE-A) infusion   Intravenous PRN Emely Hernandez, A.P.N.       • enoxaparin (LOVENOX) inj 40 mg  40 mg Subcutaneous QDAY Emely  SEJAL Hernandez, A.P.N.   40 mg at 10/25/18 1700   • mupirocin (BACTROBAN) 2 % ointment 1 Application  1 Application Topical BID Emely Hernandez A.P.N.   1 Application at 10/26/18 0600   • potassium chloride SA (Kdur) tablet 10 mEq  10 mEq Oral BID Emely Hernandez, A.P.N.   10 mEq at 10/26/18 0426    Or   • potassium chloride (KCL) ivpb 10 mEq  10 mEq Intravenous BID Emely Hernandez A.P.N.       • Magnesium Sulfate in D5W IVPB premix 1 g  1 g Intravenous QDAY Emely Hernandez, A.P.N.   Stopped at 10/25/18 1310   • metoprolol (LOPRESSOR) tablet 25 mg  25 mg Oral BID Emely Hernandez, A.P.N.   25 mg at 10/26/18 0426   • aspirin EC (ECOTRIN) tablet 81 mg  81 mg Oral DAILY Emely Hernandez, A.P.N.   81 mg at 10/26/18 0423   • clopidogrel (PLAVIX) tablet 75 mg  75 mg Oral DAILY Emely Hernandez, A.P.N.   75 mg at 10/26/18 0423   • clevidipine (CLEVIPREX) IV emulsion  1-21 mg/hr Intravenous Continuous Emely Hernandez, A.P.N.   Stopped at 10/24/18 1300   • nitroglycerin 50 mg in D5W 250 ml infusion  0-100 mcg/min Intravenous Continuous Emely Hernandez, A.P.N.   Stopped at 10/24/18 1300   • oxyCODONE immediate-release (ROXICODONE) tablet 5 mg  5 mg Oral Q3HRS PRN Emely Hernandez, A.P.N.   5 mg at 10/24/18 1926   • oxyCODONE immediate release (ROXICODONE) tablet 10 mg  10 mg Oral Q3HRS PRN Emely Hernandez, A.P.N.   10 mg at 10/26/18 0704   • fentaNYL (SUBLIMAZE) injection 50 mcg  50 mcg Intravenous Q3HRS PRN Emely Hernandez, A.P.N.       • tramadol (ULTRAM) 50 MG tablet 50 mg  50 mg Oral Q4HRS PRN Emely Hernandez, A.P.N.   50 mg at 10/25/18 2242   • dexmedetomidine (PRECEDEX) 400 mcg/100mL NS premix infusion  0-1.5 mcg/kg/hr Intravenous Continuous Emely Hernandez, A.P.N.   Stopped at 10/25/18 0700   • ondansetron (ZOFRAN) syringe/vial injection 4 mg  4 mg Intravenous Q6HRS PRN Emely Hernandez, A.P.N.   4 mg at 10/25/18 1049    Or   • prochlorperazine (COMPAZINE) injection 10 mg  10 mg Intravenous Q6HRS PRN Emely Hernandez, A.P.N.        Or   •  promethazine (PHENERGAN) suppository 25 mg  25 mg Rectal Q6HRS PRN Emely Hernandez, A.P.N.       • acetaminophen (TYLENOL) tablet 650 mg  650 mg Oral Q4HRS PRN Emely Hernandez A.P.N.        Or   • acetaminophen (TYLENOL) suppository 650 mg  650 mg Rectal Q4HRS PRN Emely Hernandez A.P.N.       • mag hydrox-al hydrox-simeth (MAALOX PLUS ES or MYLANTA DS) suspension 30 mL  30 mL Oral Q4HRS PRN Emely Hernandez A.P.N.       • diphenhydrAMINE (BENADRYL) tablet/capsule 25 mg  25 mg Oral HS PRN - MR X 1 Emely Hernandez A.P.N.       • MD Alert...Vancomycin per Pharmacy   Other pharmacy to dose Berhane Canela M.D.       • sodium acetate 50 mEq in sterile water 250 mL IV bolus  50 mEq Intravenous Q HOUR PRN Berhane Canela M.D.   Stopped at 10/24/18 1818   • vancomycin 1,500 mg in  mL IVPB  1.5 g Intravenous Q12HR Emely Hernandez A.P.N.   Stopped at 10/26/18 0013       Fluids    Intake/Output Summary (Last 24 hours) at 10/26/18 0737  Last data filed at 10/26/18 0600   Gross per 24 hour   Intake          4668.63 ml   Output             2180 ml   Net          2488.63 ml       Laboratory  Recent Labs      10/24/18   1549  10/24/18   1644  10/24/18   1738   ISTATAPH  7.276*  7.285*  7.330*   ISTATAPCO2  38.6*  38.3*  35.6   ISTATAPO2  83  83  89*   ISTATATCO2  19*  19*  20   [id number]  95  95  96   ISTATARTHCO3  18.0  18.2  18.8   ISTATARTBE  -8*  -8*  -6*   ISTATTEMP  36.7 C  36.9 C  36.8 C   ISTATFIO2  30  30  30   ISTATSPEC  Arterial  Arterial  Arterial   ISTATAPHTC  7.280*  7.287*  7.333*   [id number]  82  82  88*         Recent Labs      10/23/18   1402   10/24/18   1253  10/24/18   1740  10/25/18   0500  10/26/18   0430   SODIUM  136   --    --    --   136  126*   POTASSIUM  3.8   < >   --   3.6  4.1  4.6   CHLORIDE  101   --    --    --   104  95*   CO2  26   --    --    --   22  26   BUN  22   --    --    --   18  17   CREATININE  0.84   --    --    --   0.70  0.63   MAGNESIUM   --    --   1.8   --    --   1.8    CALCIUM  10.0   --    --    --   7.4*  8.2*    < > = values in this interval not displayed.     Recent Labs      10/23/18   1402  10/25/18   0500  10/26/18   0430   ALTSGPT  53*   --    --    ASTSGOT  34   --    --    ALKPHOSPHAT  54   --    --    TBILIRUBIN  1.3   --    --    GLUCOSE  114*  143*  138*     Recent Labs      10/23/18   1402  10/25/18   0500  10/26/18   0430   WBC  7.2  14.7*  15.2*   NEUTSPOLYS  59.30   --    --    LYMPHOCYTES  27.50   --    --    MONOCYTES  9.90   --    --    EOSINOPHILS  2.40   --    --    BASOPHILS  0.60   --    --    ASTSGOT  34   --    --    ALTSGPT  53*   --    --    ALKPHOSPHAT  54   --    --    TBILIRUBIN  1.3   --    --      Recent Labs      10/23/18   1402  10/24/18   1253  10/25/18   0500  10/26/18   0430   RBC  4.57*   --   3.27*  3.23*   HEMOGLOBIN  16.2   --   11.8*  11.1*   HEMATOCRIT  44.2   --   31.9*  31.9*   PLATELETCT  192  141*  142*  115*   PROTHROMBTM  14.1  19.5*   --    --    APTT  26.8  28.8   --    --    INR  1.08  1.64*   --    --        Imaging  X-Ray:  I have personally reviewed the images and compared with prior images.    Assessment/Plan  Hyponatremia   Assessment & Plan    Hypervolemic volume status  Start gentle lasix        Hypocalcemia   Assessment & Plan    ICa was 1.1 this am        Acute respiratory failure with hypoxia (HCC)   Assessment & Plan    Intubated 10/24-24  Continue rt/02 protocols  Is/pep/mobilize  Start gentle diureses with lasix        Coronary artery disease of native artery of native heart with stable angina pectoris (HCC)- (present on admission)   Assessment & Plan    S/P 3V Cabg 10/24  Continue post heart protocols  IS/PEP/Mobilize        Dyslipidemia- (present on admission)   Assessment & Plan    statin             VTE:  Lovenox  Ulcer: Not Indicated  Lines: Central Line  Ongoing indication addressed    I have performed a physical exam and reviewed and updated ROS and Plan today (10/26/2018). In review of yesterday's note  (10/25/2018), there are no changes except as documented above.     Discussed patient condition and risk of morbidity and/or mortality with RN, RT, Therapies, Pharmacy, Patient and CVS

## 2018-10-27 ENCOUNTER — APPOINTMENT (OUTPATIENT)
Dept: RADIOLOGY | Facility: MEDICAL CENTER | Age: 63
DRG: 235 | End: 2018-10-27
Attending: CLINICAL NURSE SPECIALIST
Payer: COMMERCIAL

## 2018-10-27 PROBLEM — E83.42 HYPOMAGNESEMIA: Status: ACTIVE | Noted: 2018-10-27

## 2018-10-27 LAB
ANION GAP SERPL CALC-SCNC: 4 MMOL/L (ref 0–11.9)
BUN SERPL-MCNC: 17 MG/DL (ref 8–22)
CALCIUM SERPL-MCNC: 8.6 MG/DL (ref 8.5–10.5)
CHLORIDE SERPL-SCNC: 97 MMOL/L (ref 96–112)
CO2 SERPL-SCNC: 29 MMOL/L (ref 20–33)
CREAT SERPL-MCNC: 0.64 MG/DL (ref 0.5–1.4)
ERYTHROCYTE [DISTWIDTH] IN BLOOD BY AUTOMATED COUNT: 41.1 FL (ref 35.9–50)
GLUCOSE SERPL-MCNC: 131 MG/DL (ref 65–99)
HCT VFR BLD AUTO: 29.1 % (ref 42–52)
HGB BLD-MCNC: 10.4 G/DL (ref 14–18)
MAGNESIUM SERPL-MCNC: 1.9 MG/DL (ref 1.5–2.5)
MCH RBC QN AUTO: 34.8 PG (ref 27–33)
MCHC RBC AUTO-ENTMCNC: 35.7 G/DL (ref 33.7–35.3)
MCV RBC AUTO: 97.3 FL (ref 81.4–97.8)
PLATELET # BLD AUTO: 115 K/UL (ref 164–446)
PMV BLD AUTO: 9.3 FL (ref 9–12.9)
POTASSIUM SERPL-SCNC: 4 MMOL/L (ref 3.6–5.5)
RBC # BLD AUTO: 2.99 M/UL (ref 4.7–6.1)
SODIUM SERPL-SCNC: 130 MMOL/L (ref 135–145)
WBC # BLD AUTO: 10.2 K/UL (ref 4.8–10.8)

## 2018-10-27 PROCEDURE — 71046 X-RAY EXAM CHEST 2 VIEWS: CPT

## 2018-10-27 PROCEDURE — 99232 SBSQ HOSP IP/OBS MODERATE 35: CPT | Performed by: INTERNAL MEDICINE

## 2018-10-27 PROCEDURE — 700111 HCHG RX REV CODE 636 W/ 250 OVERRIDE (IP): Performed by: CLINICAL NURSE SPECIALIST

## 2018-10-27 PROCEDURE — A9270 NON-COVERED ITEM OR SERVICE: HCPCS | Performed by: CLINICAL NURSE SPECIALIST

## 2018-10-27 PROCEDURE — 770020 HCHG ROOM/CARE - TELE (206)

## 2018-10-27 PROCEDURE — 80048 BASIC METABOLIC PNL TOTAL CA: CPT

## 2018-10-27 PROCEDURE — 700105 HCHG RX REV CODE 258: Performed by: CLINICAL NURSE SPECIALIST

## 2018-10-27 PROCEDURE — A9270 NON-COVERED ITEM OR SERVICE: HCPCS | Performed by: INTERNAL MEDICINE

## 2018-10-27 PROCEDURE — 85027 COMPLETE CBC AUTOMATED: CPT

## 2018-10-27 PROCEDURE — 83735 ASSAY OF MAGNESIUM: CPT

## 2018-10-27 PROCEDURE — 94668 MNPJ CHEST WALL SBSQ: CPT

## 2018-10-27 PROCEDURE — 700102 HCHG RX REV CODE 250 W/ 637 OVERRIDE(OP): Performed by: CLINICAL NURSE SPECIALIST

## 2018-10-27 PROCEDURE — 700102 HCHG RX REV CODE 250 W/ 637 OVERRIDE(OP): Performed by: INTERNAL MEDICINE

## 2018-10-27 PROCEDURE — 700111 HCHG RX REV CODE 636 W/ 250 OVERRIDE (IP): Performed by: INTERNAL MEDICINE

## 2018-10-27 RX ORDER — POLYETHYLENE GLYCOL 3350 17 G/17G
1 POWDER, FOR SOLUTION ORAL 2 TIMES DAILY
Status: DISCONTINUED | OUTPATIENT
Start: 2018-10-27 | End: 2018-10-29 | Stop reason: HOSPADM

## 2018-10-27 RX ORDER — METOCLOPRAMIDE 10 MG/1
5 TABLET ORAL
Status: DISCONTINUED | OUTPATIENT
Start: 2018-10-27 | End: 2018-10-28

## 2018-10-27 RX ORDER — MAGNESIUM SULFATE HEPTAHYDRATE 40 MG/ML
2 INJECTION, SOLUTION INTRAVENOUS ONCE
Status: COMPLETED | OUTPATIENT
Start: 2018-10-27 | End: 2018-10-27

## 2018-10-27 RX ADMIN — GABAPENTIN 100 MG: 100 CAPSULE ORAL at 11:46

## 2018-10-27 RX ADMIN — OXYCODONE HYDROCHLORIDE 10 MG: 10 TABLET ORAL at 05:46

## 2018-10-27 RX ADMIN — METOCLOPRAMIDE HYDROCHLORIDE 5 MG: 10 TABLET ORAL at 13:27

## 2018-10-27 RX ADMIN — POLYETHYLENE GLYCOL 3350 1 PACKET: 17 POWDER, FOR SOLUTION ORAL at 11:35

## 2018-10-27 RX ADMIN — OXYCODONE HYDROCHLORIDE 10 MG: 10 TABLET ORAL at 01:32

## 2018-10-27 RX ADMIN — POLYETHYLENE GLYCOL 3350 1 PACKET: 17 POWDER, FOR SOLUTION ORAL at 16:57

## 2018-10-27 RX ADMIN — POTASSIUM CHLORIDE 10 MEQ: 1500 TABLET, EXTENDED RELEASE ORAL at 16:58

## 2018-10-27 RX ADMIN — ENOXAPARIN SODIUM 40 MG: 100 INJECTION SUBCUTANEOUS at 17:03

## 2018-10-27 RX ADMIN — POTASSIUM CHLORIDE 10 MEQ: 1500 TABLET, EXTENDED RELEASE ORAL at 05:48

## 2018-10-27 RX ADMIN — MUPIROCIN 1 APPLICATION: 20 OINTMENT TOPICAL at 16:57

## 2018-10-27 RX ADMIN — OXYCODONE HYDROCHLORIDE 10 MG: 10 TABLET ORAL at 16:59

## 2018-10-27 RX ADMIN — SENNOSIDES AND DOCUSATE SODIUM 2 TABLET: 8.6; 5 TABLET ORAL at 05:45

## 2018-10-27 RX ADMIN — GABAPENTIN 100 MG: 100 CAPSULE ORAL at 16:58

## 2018-10-27 RX ADMIN — OXYCODONE HYDROCHLORIDE 5 MG: 5 TABLET ORAL at 21:32

## 2018-10-27 RX ADMIN — VANCOMYCIN HYDROCHLORIDE 1500 MG: 100 INJECTION, POWDER, LYOPHILIZED, FOR SOLUTION INTRAVENOUS at 00:20

## 2018-10-27 RX ADMIN — ATORVASTATIN CALCIUM 80 MG: 20 TABLET, FILM COATED ORAL at 05:46

## 2018-10-27 RX ADMIN — MUPIROCIN 1 APPLICATION: 20 OINTMENT TOPICAL at 06:00

## 2018-10-27 RX ADMIN — ASPIRIN 81 MG: 81 TABLET, COATED ORAL at 05:47

## 2018-10-27 RX ADMIN — GABAPENTIN 100 MG: 100 CAPSULE ORAL at 05:46

## 2018-10-27 RX ADMIN — METOPROLOL TARTRATE 25 MG: 25 TABLET, FILM COATED ORAL at 05:48

## 2018-10-27 RX ADMIN — FUROSEMIDE 20 MG: 10 INJECTION, SOLUTION INTRAMUSCULAR; INTRAVENOUS at 05:50

## 2018-10-27 RX ADMIN — SENNOSIDES AND DOCUSATE SODIUM 2 TABLET: 8.6; 5 TABLET ORAL at 16:59

## 2018-10-27 RX ADMIN — FUROSEMIDE 20 MG: 10 INJECTION, SOLUTION INTRAMUSCULAR; INTRAVENOUS at 16:59

## 2018-10-27 RX ADMIN — MAGNESIUM SULFATE IN WATER 2 G: 40 INJECTION, SOLUTION INTRAVENOUS at 11:36

## 2018-10-27 RX ADMIN — METOCLOPRAMIDE HYDROCHLORIDE 5 MG: 10 TABLET ORAL at 16:58

## 2018-10-27 RX ADMIN — OXYCODONE HYDROCHLORIDE 10 MG: 10 TABLET ORAL at 11:36

## 2018-10-27 RX ADMIN — METOPROLOL TARTRATE 25 MG: 25 TABLET, FILM COATED ORAL at 17:00

## 2018-10-27 RX ADMIN — CLOPIDOGREL 75 MG: 75 TABLET, FILM COATED ORAL at 05:47

## 2018-10-27 ASSESSMENT — PAIN SCALES - GENERAL
PAINLEVEL_OUTOF10: 4
PAINLEVEL_OUTOF10: 0
PAINLEVEL_OUTOF10: 0
PAINLEVEL_OUTOF10: 7
PAINLEVEL_OUTOF10: 7
PAINLEVEL_OUTOF10: 0
PAINLEVEL_OUTOF10: 2
PAINLEVEL_OUTOF10: 6
PAINLEVEL_OUTOF10: 5
PAINLEVEL_OUTOF10: 0
PAINLEVEL_OUTOF10: 4

## 2018-10-27 ASSESSMENT — ENCOUNTER SYMPTOMS
SORE THROAT: 0
COUGH: 0
CARDIOVASCULAR NEGATIVE: 1
PSYCHIATRIC NEGATIVE: 1
NAUSEA: 0
CHILLS: 0
VOMITING: 0
SPUTUM PRODUCTION: 0
CONSTITUTIONAL NEGATIVE: 1
EYES NEGATIVE: 1
NEUROLOGICAL NEGATIVE: 1
RESPIRATORY NEGATIVE: 1
SHORTNESS OF BREATH: 0
DEPRESSION: 0
FEVER: 0
MUSCULOSKELETAL NEGATIVE: 1
PALPITATIONS: 0
GASTROINTESTINAL NEGATIVE: 1
ABDOMINAL PAIN: 0

## 2018-10-27 NOTE — PROGRESS NOTES
Critical Care Progress Note    Date of admission  10/24/2018    Chief Complaint  63 y.o. male admitted 10/24/2018 with multivessel cad    Hospital Course    63-year-old gentleman with known past   medical history of chronic hypertension, dyslipidemia, chronic back pain, and   recently diagnosed multivessel coronary artery disease on cardiac   catheterization on 08/29/2018.  Patient admitted today for elective triple   vessel bypass.  ____ time recovering from anesthesia on full mechanical   ventilatory support with infusing epinephrine, insulin and Precedex infusions.    No intraoperative complications noted.  Patient did have bilateral mammary   artery harvest.      Interval Problem Update  Reviewed last 24 hour events:  Tm 98.3  -1.1L over last 24hr, +8.4L since admit  No cxr this am  Na 130  K 4.0  Mg 1.9    Lasix 20 bid    2L Nc  IS  Last BM pta      Review of Systems  Review of Systems   Constitutional: Negative for chills and fever.   HENT: Negative for congestion and sore throat.    Respiratory: Negative for cough, sputum production and shortness of breath.    Cardiovascular: Positive for chest pain. Negative for palpitations.   Gastrointestinal: Negative for abdominal pain, nausea and vomiting.   Psychiatric/Behavioral: Negative for depression.   All other systems reviewed and are negative.       Vital Signs for last 24 hours   Temp:  [36.6 °C (97.9 °F)-36.8 °C (98.3 °F)] 36.8 °C (98.2 °F)  Pulse:  [] 88  Resp:  [15-37] 18    Hemodynamic parameters for last 24 hours       Respiratory       Physical Exam   Physical Exam   Constitutional: He appears well-developed and well-nourished.   HENT:   Head: Normocephalic and atraumatic.   Eyes: Pupils are equal, round, and reactive to light. No scleral icterus.   Neck: No tracheal deviation present.   Cardiovascular: Normal rate and intact distal pulses.    Pulmonary/Chest: He has no wheezes. He has rales.   Abdominal: Soft. He exhibits no distension. There is  no tenderness.   Musculoskeletal: He exhibits edema.   Neurological: No cranial nerve deficit.   Skin: Skin is warm and dry.   Psychiatric: He has a normal mood and affect.   Nursing note and vitals reviewed.      Medications  Current Facility-Administered Medications   Medication Dose Route Frequency Provider Last Rate Last Dose   • albuterol (PROVENTIL) 2.5mg/0.5ml nebulizer solution 2.5 mg  2.5 mg Nebulization Q4H PRN (RT) Gallo Smith M.D.   2.5 mg at 10/26/18 0610   • furosemide (LASIX) injection 20 mg  20 mg Intravenous BID DIURETIC Gallo Smith M.D.   20 mg at 10/27/18 0550   • EPINEPHrine 4 mg in  mL Infusion  0-0.2 mcg/kg/min Intravenous Continuous Emely Hernandez, A.P.N.   Stopped at 10/25/18 1100   • atorvastatin (LIPITOR) tablet 80 mg  80 mg Oral DAILY Emely Hernandez A.P.N.   80 mg at 10/27/18 0546   • cyclobenzaprine (FLEXERIL) tablet 5 mg  5 mg Oral TID PRN Emely Hernandez A.P.N.   5 mg at 10/24/18 1924   • gabapentin (NEURONTIN) capsule 100 mg  100 mg Oral TID Emely Hernandez, A.P.N.   100 mg at 10/27/18 0546   • Respiratory Care per Protocol   Nebulization Continuous RT Emely Hernandez, A.P.N.       • NS infusion   Intravenous Continuous Emely Hernandez A.P.N. 10 mL/hr at 10/24/18 1300     • Pharmacy Consult Request ...Pain Management Review 1 Each  1 Each Other PRN Emely Hernandez, A.P.N.       • senna-docusate (PERICOLACE or SENOKOT S) 8.6-50 MG per tablet 2 Tab  2 Tab Oral BID Emely Hernandez A.P.N.   2 Tab at 10/27/18 0545    And   • polyethylene glycol/lytes (MIRALAX) PACKET 1 Packet  1 Packet Oral QDAY PRN Emely Hernandez A.P.N.        And   • magnesium hydroxide (MILK OF MAGNESIA) suspension 30 mL  30 mL Oral QDAY PRN Emely Hernandez A.P.N.        And   • bisacodyl (DULCOLAX) suppository 10 mg  10 mg Rectal QDAY PRN Emely Hernandez, A.P.N.       • electrolyte-A (PLASMALYTE-A) infusion   Intravenous PRN Emely Hernandez, A.P.N.       • enoxaparin (LOVENOX) inj 40 mg  40 mg Subcutaneous  QDAY Emely Hernandez, A.P.N.   40 mg at 10/26/18 1723   • mupirocin (BACTROBAN) 2 % ointment 1 Application  1 Application Topical BID Emely Hernandez, A.P.N.   1 Application at 10/27/18 0600   • potassium chloride SA (Kdur) tablet 10 mEq  10 mEq Oral BID Emely Hernandez, A.P.N.   10 mEq at 10/27/18 0548    Or   • potassium chloride (KCL) ivpb 10 mEq  10 mEq Intravenous BID Emely Hernandez, A.P.N.       • metoprolol (LOPRESSOR) tablet 25 mg  25 mg Oral BID Emely  David, A.P.N.   25 mg at 10/27/18 0548   • aspirin EC (ECOTRIN) tablet 81 mg  81 mg Oral DAILY Emely  David, A.P.N.   81 mg at 10/27/18 0547   • clopidogrel (PLAVIX) tablet 75 mg  75 mg Oral DAILY Emely  David, A.P.N.   75 mg at 10/27/18 0547   • clevidipine (CLEVIPREX) IV emulsion  1-21 mg/hr Intravenous Continuous Emely Hernandez, A.P.N.   Stopped at 10/24/18 1300   • nitroglycerin 50 mg in D5W 250 ml infusion  0-100 mcg/min Intravenous Continuous Emely Hernandez, A.P.N.   Stopped at 10/24/18 1300   • oxyCODONE immediate-release (ROXICODONE) tablet 5 mg  5 mg Oral Q3HRS PRN Emely Hernandez, A.P.N.   5 mg at 10/24/18 1926   • oxyCODONE immediate release (ROXICODONE) tablet 10 mg  10 mg Oral Q3HRS PRN Emely Hernandez, A.P.N.   10 mg at 10/27/18 0546   • fentaNYL (SUBLIMAZE) injection 50 mcg  50 mcg Intravenous Q3HRS PRN Emely Hernandez, A.P.N.       • tramadol (ULTRAM) 50 MG tablet 50 mg  50 mg Oral Q4HRS PRN Emely Hernandez, A.P.N.   50 mg at 10/25/18 2242   • dexmedetomidine (PRECEDEX) 400 mcg/100mL NS premix infusion  0-1.5 mcg/kg/hr Intravenous Continuous Emely Hernandez, A.P.N.   Stopped at 10/25/18 0700   • ondansetron (ZOFRAN) syringe/vial injection 4 mg  4 mg Intravenous Q6HRS PRN Emely Hernandez A.P.N.   4 mg at 10/26/18 1716    Or   • prochlorperazine (COMPAZINE) injection 10 mg  10 mg Intravenous Q6HRS PRN Emely Hernandez A.P.N.        Or   • promethazine (PHENERGAN) suppository 25 mg  25 mg Rectal Q6HRS PRN Emely Hernandez, A.P.N.       • acetaminophen  (TYLENOL) tablet 650 mg  650 mg Oral Q4HRS PRN Emely Hernandez, A.P.N.        Or   • acetaminophen (TYLENOL) suppository 650 mg  650 mg Rectal Q4HRS PRN Emely Hernandez A.P.N.       • mag hydrox-al hydrox-simeth (MAALOX PLUS ES or MYLANTA DS) suspension 30 mL  30 mL Oral Q4HRS PRN Emely Hernandez A.P.N.       • diphenhydrAMINE (BENADRYL) tablet/capsule 25 mg  25 mg Oral HS PRN - MR X 1 Emely Hernandez A.P.N.       • sodium acetate 50 mEq in sterile water 250 mL IV bolus  50 mEq Intravenous Q HOUR PRN Berhane Canela M.D.   Stopped at 10/24/18 1818       Fluids    Intake/Output Summary (Last 24 hours) at 10/27/18 0742  Last data filed at 10/27/18 0500   Gross per 24 hour   Intake             1090 ml   Output             2255 ml   Net            -1165 ml       Laboratory  Recent Labs      10/24/18   1549  10/24/18   1644  10/24/18   1738   ISTATAPH  7.276*  7.285*  7.330*   ISTATAPCO2  38.6*  38.3*  35.6   ISTATAPO2  83  83  89*   ISTATATCO2  19*  19*  20   [id number]  95  95  96   ISTATARTHCO3  18.0  18.2  18.8   ISTATARTBE  -8*  -8*  -6*   ISTATTEMP  36.7 C  36.9 C  36.8 C   ISTATFIO2  30  30  30   ISTATSPEC  Arterial  Arterial  Arterial   ISTATAPHTC  7.280*  7.287*  7.333*   [id number]  82  82  88*         Recent Labs      10/24/18   1253   10/25/18   0500  10/26/18   0430  10/27/18   0554   SODIUM   --    --   136  126*  130*   POTASSIUM   --    < >  4.1  4.6  4.0   CHLORIDE   --    --   104  95*  97   CO2   --    --   22  26  29   BUN   --    --   18  17  17   CREATININE   --    --   0.70  0.63  0.64   MAGNESIUM  1.8   --    --   1.8  1.9   CALCIUM   --    --   7.4*  8.2*  8.6    < > = values in this interval not displayed.     Recent Labs      10/25/18   0500  10/26/18   0430  10/27/18   0554   GLUCOSE  143*  138*  131*     Recent Labs      10/25/18   0500  10/26/18   0430  10/27/18   0554   WBC  14.7*  15.2*  10.2     Recent Labs      10/24/18   1253  10/25/18   0500  10/26/18   0430  10/27/18   0554   RBC    --   3.27*  3.23*  2.99*   HEMOGLOBIN   --   11.8*  11.1*  10.4*   HEMATOCRIT   --   31.9*  31.9*  29.1*   PLATELETCT  141*  142*  115*  115*   PROTHROMBTM  19.5*   --    --    --    APTT  28.8   --    --    --    INR  1.64*   --    --    --        Imaging  X-Ray:  No film today    Assessment/Plan  Hypomagnesemia   Assessment & Plan    Replace to keep > 2        Hyponatremia   Assessment & Plan    Hypervolemic volume status  Lasix  Improving, will follow        Acute respiratory failure with hypoxia (HCC)   Assessment & Plan    Intubated 10/24-24  Continue rt/02 protocols  IS/PEP/Mobilize  Diurese with lasix        Coronary artery disease of native artery of native heart with stable angina pectoris (HCC)- (present on admission)   Assessment & Plan    S/P 3V Cabg 10/24  Asa/Plavix/Statin/BB  Continue post heart protocols  IS/PEP/Mobilize  Pain control  Diureses with lasix        Dyslipidemia- (present on admission)   Assessment & Plan    statin             VTE:  Lovenox  Ulcer: Not Indicated  Lines: Central Line  Ongoing indication addressed    I have performed a physical exam and reviewed and updated ROS and Plan today (10/27/2018). In review of yesterday's note (10/26/2018), there are no changes except as documented above.     Discussed patient condition and risk of morbidity and/or mortality with RN, RT, Therapies, Pharmacy, Patient and [facility]

## 2018-10-27 NOTE — PROGRESS NOTES
Cardiovascular Surgery Progress Note    Name: Santi Landers  MRN: [id number]  : 1955  Admit Date: 10/24/2018  5:05 AM  Procedure:  Procedure(s) and Anesthesia Type:     * MULTIPLE CORONARY ARTERY BYPASS x 3 ENDO VEIN HARVEST- RIGHT LEG, BILATERAL ANTERIOR MAMMARY ARTERY HARVEST - General     * LORENA - General  3 Day Post-Op    Vitals:  Patient Vitals for the past 8 hrs:   Temp SpO2 O2 Delivery O2 (LPM) Pulse Heart Rate (Monitored) Resp Weight   10/27/18 1100 - 94 % - - (!) 101 (!) 102 (!) 23 -   10/27/18 1000 - 95 % Silicone Nasal Cannula 2 90 94 14 -   10/27/18 0930 - 93 % - 2 98 95 (!) 27 -   10/27/18 0900 - 95 % Silicone Nasal Cannula 2 99 99 (!) 21 -   10/27/18 0800 36.3 °C (97.3 °F) 94 % - - 100 99 (!) 21 106.9 kg (235 lb 10.8 oz)     Temp (24hrs), Av.6 °C (97.9 °F), Min:36.3 °C (97.3 °F), Max:36.8 °C (98.3 °F)      Respiratory:    Respiration: (!) 23, Pulse Oximetry: 94 %, O2 Daily Delivery Respiratory : Silicone Nasal Cannula     Chest Tube Drains:          Fluids:    Intake/Output Summary (Last 24 hours) at 10/27/18 1234  Last data filed at 10/27/18 0500   Gross per 24 hour   Intake             1090 ml   Output             1750 ml   Net             -660 ml     Admit weight: Weight: 96.9 kg (213 lb 10 oz)  Current weight: Weight: 106.9 kg (235 lb 10.8 oz) (10/27/18 0800)    Labs:  Recent Labs      10/25/18   0500  10/26/18   0430  10/27/18   0554   WBC  14.7*  15.2*  10.2   RBC  3.27*  3.23*  2.99*   HEMOGLOBIN  11.8*  11.1*  10.4*   HEMATOCRIT  31.9*  31.9*  29.1*   MCV  96.9  98.8*  97.3   MCH  35.2*  34.4*  34.8*   MCHC  36.3*  34.8  35.7*   RDW  42.5  43.8  41.1   PLATELETCT  142*  115*  115*   MPV  9.1  9.4  9.3         Recent Labs      10/25/18   0500  10/26/18   0430  10/27/18   0554   SODIUM  136  126*  130*   POTASSIUM  4.1  4.6  4.0   CHLORIDE  104  95*  97   CO2  22  26  29   GLUCOSE  143*  138*  131*   BUN  18  17  17   CREATININE  0.70  0.63  0.64   CALCIUM  7.4*  8.2*  8.6     Recent Labs       10/24/18   1253   APTT  28.8   INR  1.64*       Medications:  • polyethylene glycol/lytes  1 Packet     • magnesium sulfate  2 g 2 g (10/27/18 1136)   • furosemide  20 mg     • atorvastatin  80 mg     • gabapentin  100 mg     • senna-docusate  2 Tab     • enoxaparin  40 mg     • mupirocin  1 Application     • potassium chloride SA  10 mEq     • metoprolol  25 mg     • aspirin EC  81 mg     • clopidogrel  75 mg          Ordered Medications:    ASA - Yes    Plavix - Yes    Post-operative Beta Blockers - Yes    Ace Inhibitor - No; contraindicated because of Other ef normal    Statin - Yes          Central Line:  Type of line: CVP  Date of insertion: 10/24/2018  Date of removal:     Exam:   Review of Systems   Constitutional: Negative.    HENT: Negative.    Eyes: Negative.    Respiratory: Negative.    Cardiovascular: Negative.    Gastrointestinal: Negative.    Genitourinary: Negative.    Musculoskeletal: Negative.    Skin: Negative.    Neurological: Negative.    Psychiatric/Behavioral: Negative.        Physical Exam   Constitutional: He is oriented to person, place, and time. He appears well-developed and well-nourished.   HENT:   Head: Normocephalic and atraumatic.   Eyes: Pupils are equal, round, and reactive to light.   Neck: Normal range of motion. Neck supple.   Cardiovascular: Normal rate and regular rhythm.    Pulmonary/Chest: Effort normal.   Decreased at bases.    Abdominal: Soft. Bowel sounds are normal.   Musculoskeletal: Normal range of motion.   Neurological: He is alert and oriented to person, place, and time.   Skin: Skin is warm and dry.   Wounds CDI   Psychiatric: He has a normal mood and affect.       Quality Measures:   Quality-Core Measures   Reviewed items::  EKG reviewed, Radiology images reviewed, Labs reviewed and Medications reviewed  Chavarria catheter::  No Chavarria  Central line in place:  Need for access  DVT prophylaxis pharmacological::  Enoxaparin (Lovenox)  DVT prophylaxis - mechanical:   SCDs  Ulcer Prophylaxis::  Yes  Assessed for rehabilitation services:  Patient returned to prior level of function, rehabilitation not indicated at this time      Assessment/Plan:  POD 1 Extubated, HDS, no drips, chest tube output minimal and negative for air leak.  Neuro grossly intact.  Plan:  Transition to tele status.  DC chest tubes.  DC durán.  POD 2 HDS, chest tube output minimal, neuro intact, wounds CDI, abd S/NT.  Plan:  Dc chest tubes, blakes and pacing wires.   POD 3 HDS, neuro intact, wounds CDI, abd taunt and hypoactive BS.  SR.  CXR noted.  Plan:  Start reglan today.      Active Hospital Problems    Diagnosis   • Hypomagnesemia [E83.42]   • Hyponatremia [E87.1]   • Acute respiratory failure with hypoxia (HCC) [J96.01]   • Hypocalcemia [E83.51]   • Coronary artery disease of native artery of native heart with stable angina pectoris (HCC) [I25.118]   • Dyslipidemia [E78.5]

## 2018-10-27 NOTE — CARE PLAN
Problem: Post op day 2 CABG/Heart Valve Replacement  Goal: Optimal care of the post op CABG/heart valve replacement post op day 2    Intervention: FSBS: when 2 consecutive BS < 130 after post op day 2, discontinue FSBS unless patient is insulin dependent diabetic  Completed per protocol.   Intervention: Daily Weights  Completed.   Intervention: Up in chair for all meals  Pt in chair for lunch and dinner.   Intervention: Ambulate, increasing the distance each time x 3 and before bed  Pt ambulated twice today. Max distance 100ft.  Intervention: Stand at sink and wash up with assistance.  Clean incisions twice daily with soap and water.  NA, prevena in place.   Intervention: IS q 1 hour while awake and record best IS volume  Pt pulls max 600. Baseline 3500. Pt encouraged to use 8-10 times per hour.   Intervention: Consider pacer wire removal by MD  Removed by KENDRICK Hernandez.   Intervention: Consider removal of durán and chest tube if not already done  Removed per order.

## 2018-10-27 NOTE — THERAPY
"Occupational Therapy Evaluation completed.   Functional Status:  Pt had just mobilized with nsg prior to session; seated in chair.  Pt currently requires min A UB/LB dressing.  Pt limited by fatigued and lines/tubes (pt has 2 chest tubes).  Pt and pt's spouse provided OHS education and handout; both were receptive.  Pt stood with min A and took a few steps in room w/HHA.  Plan of Care: Will benefit from Occupational Therapy 3 times per week  Discharge Recommendations:  Equipment: Shower Chair. Pt will benefit from home health at [place].    See \"Rehab Therapy-Acute\" Patient Summary Report for complete documentation.    "

## 2018-10-28 ENCOUNTER — APPOINTMENT (OUTPATIENT)
Dept: RADIOLOGY | Facility: MEDICAL CENTER | Age: 63
DRG: 235 | End: 2018-10-28
Attending: CLINICAL NURSE SPECIALIST
Payer: COMMERCIAL

## 2018-10-28 PROBLEM — K59.09 OTHER CONSTIPATION: Status: ACTIVE | Noted: 2018-10-28

## 2018-10-28 LAB
ANION GAP SERPL CALC-SCNC: 8 MMOL/L (ref 0–11.9)
BUN SERPL-MCNC: 18 MG/DL (ref 8–22)
CALCIUM SERPL-MCNC: 8.7 MG/DL (ref 8.5–10.5)
CHLORIDE SERPL-SCNC: 96 MMOL/L (ref 96–112)
CO2 SERPL-SCNC: 30 MMOL/L (ref 20–33)
CREAT SERPL-MCNC: 0.73 MG/DL (ref 0.5–1.4)
ERYTHROCYTE [DISTWIDTH] IN BLOOD BY AUTOMATED COUNT: 43.1 FL (ref 35.9–50)
GLUCOSE SERPL-MCNC: 117 MG/DL (ref 65–99)
HCT VFR BLD AUTO: 30.6 % (ref 42–52)
HGB BLD-MCNC: 11.3 G/DL (ref 14–18)
MAGNESIUM SERPL-MCNC: 1.9 MG/DL (ref 1.5–2.5)
MCH RBC QN AUTO: 36.2 PG (ref 27–33)
MCHC RBC AUTO-ENTMCNC: 36.9 G/DL (ref 33.7–35.3)
MCV RBC AUTO: 98.1 FL (ref 81.4–97.8)
PLATELET # BLD AUTO: 141 K/UL (ref 164–446)
PMV BLD AUTO: 9.6 FL (ref 9–12.9)
POTASSIUM SERPL-SCNC: 3.3 MMOL/L (ref 3.6–5.5)
RBC # BLD AUTO: 3.12 M/UL (ref 4.7–6.1)
SODIUM SERPL-SCNC: 134 MMOL/L (ref 135–145)
WBC # BLD AUTO: 6.9 K/UL (ref 4.8–10.8)

## 2018-10-28 PROCEDURE — 770020 HCHG ROOM/CARE - TELE (206)

## 2018-10-28 PROCEDURE — 700102 HCHG RX REV CODE 250 W/ 637 OVERRIDE(OP): Performed by: INTERNAL MEDICINE

## 2018-10-28 PROCEDURE — 99232 SBSQ HOSP IP/OBS MODERATE 35: CPT | Performed by: INTERNAL MEDICINE

## 2018-10-28 PROCEDURE — 700111 HCHG RX REV CODE 636 W/ 250 OVERRIDE (IP): Performed by: INTERNAL MEDICINE

## 2018-10-28 PROCEDURE — 85027 COMPLETE CBC AUTOMATED: CPT

## 2018-10-28 PROCEDURE — 83735 ASSAY OF MAGNESIUM: CPT

## 2018-10-28 PROCEDURE — 76604 US EXAM CHEST: CPT

## 2018-10-28 PROCEDURE — A9270 NON-COVERED ITEM OR SERVICE: HCPCS | Performed by: INTERNAL MEDICINE

## 2018-10-28 PROCEDURE — 80048 BASIC METABOLIC PNL TOTAL CA: CPT

## 2018-10-28 PROCEDURE — A9270 NON-COVERED ITEM OR SERVICE: HCPCS | Performed by: CLINICAL NURSE SPECIALIST

## 2018-10-28 PROCEDURE — 71045 X-RAY EXAM CHEST 1 VIEW: CPT

## 2018-10-28 PROCEDURE — 94668 MNPJ CHEST WALL SBSQ: CPT

## 2018-10-28 PROCEDURE — 700102 HCHG RX REV CODE 250 W/ 637 OVERRIDE(OP): Performed by: CLINICAL NURSE SPECIALIST

## 2018-10-28 PROCEDURE — 700111 HCHG RX REV CODE 636 W/ 250 OVERRIDE (IP): Performed by: CLINICAL NURSE SPECIALIST

## 2018-10-28 PROCEDURE — 32555 ASPIRATE PLEURA W/ IMAGING: CPT | Mod: LT

## 2018-10-28 RX ORDER — POTASSIUM CHLORIDE 20 MEQ/1
20 TABLET, EXTENDED RELEASE ORAL 2 TIMES DAILY
Status: DISCONTINUED | OUTPATIENT
Start: 2018-10-28 | End: 2018-10-29 | Stop reason: HOSPADM

## 2018-10-28 RX ORDER — POTASSIUM CHLORIDE 20 MEQ/1
20 TABLET, EXTENDED RELEASE ORAL ONCE
Status: COMPLETED | OUTPATIENT
Start: 2018-10-28 | End: 2018-10-28

## 2018-10-28 RX ORDER — MAGNESIUM SULFATE HEPTAHYDRATE 40 MG/ML
2 INJECTION, SOLUTION INTRAVENOUS ONCE
Status: COMPLETED | OUTPATIENT
Start: 2018-10-28 | End: 2018-10-28

## 2018-10-28 RX ADMIN — METOPROLOL TARTRATE 25 MG: 25 TABLET, FILM COATED ORAL at 05:09

## 2018-10-28 RX ADMIN — MUPIROCIN 1 APPLICATION: 20 OINTMENT TOPICAL at 05:05

## 2018-10-28 RX ADMIN — FUROSEMIDE 20 MG: 10 INJECTION, SOLUTION INTRAMUSCULAR; INTRAVENOUS at 05:05

## 2018-10-28 RX ADMIN — POTASSIUM CHLORIDE 20 MEQ: 1500 TABLET, EXTENDED RELEASE ORAL at 13:20

## 2018-10-28 RX ADMIN — OXYCODONE HYDROCHLORIDE 5 MG: 5 TABLET ORAL at 20:03

## 2018-10-28 RX ADMIN — CLOPIDOGREL 75 MG: 75 TABLET, FILM COATED ORAL at 05:06

## 2018-10-28 RX ADMIN — METOCLOPRAMIDE HYDROCHLORIDE 5 MG: 10 TABLET ORAL at 07:44

## 2018-10-28 RX ADMIN — FUROSEMIDE 20 MG: 10 INJECTION, SOLUTION INTRAMUSCULAR; INTRAVENOUS at 16:16

## 2018-10-28 RX ADMIN — GABAPENTIN 100 MG: 100 CAPSULE ORAL at 12:24

## 2018-10-28 RX ADMIN — POTASSIUM CHLORIDE 10 MEQ: 1500 TABLET, EXTENDED RELEASE ORAL at 05:07

## 2018-10-28 RX ADMIN — OXYCODONE HYDROCHLORIDE 5 MG: 5 TABLET ORAL at 05:14

## 2018-10-28 RX ADMIN — ATORVASTATIN CALCIUM 80 MG: 20 TABLET, FILM COATED ORAL at 05:06

## 2018-10-28 RX ADMIN — POLYETHYLENE GLYCOL 3350 1 PACKET: 17 POWDER, FOR SOLUTION ORAL at 05:07

## 2018-10-28 RX ADMIN — ENOXAPARIN SODIUM 40 MG: 100 INJECTION SUBCUTANEOUS at 18:01

## 2018-10-28 RX ADMIN — ASPIRIN 81 MG: 81 TABLET, COATED ORAL at 05:06

## 2018-10-28 RX ADMIN — OXYCODONE HYDROCHLORIDE 5 MG: 5 TABLET ORAL at 12:24

## 2018-10-28 RX ADMIN — MAGNESIUM SULFATE IN WATER 2 G: 40 INJECTION, SOLUTION INTRAVENOUS at 16:07

## 2018-10-28 RX ADMIN — GABAPENTIN 100 MG: 100 CAPSULE ORAL at 18:01

## 2018-10-28 RX ADMIN — SENNOSIDES AND DOCUSATE SODIUM 2 TABLET: 8.6; 5 TABLET ORAL at 05:06

## 2018-10-28 RX ADMIN — MUPIROCIN 1 APPLICATION: 20 OINTMENT TOPICAL at 18:00

## 2018-10-28 RX ADMIN — GABAPENTIN 100 MG: 100 CAPSULE ORAL at 05:05

## 2018-10-28 RX ADMIN — METOPROLOL TARTRATE 25 MG: 25 TABLET, FILM COATED ORAL at 18:00

## 2018-10-28 RX ADMIN — POTASSIUM CHLORIDE 20 MEQ: 1500 TABLET, EXTENDED RELEASE ORAL at 18:01

## 2018-10-28 ASSESSMENT — ENCOUNTER SYMPTOMS
COUGH: 0
NEUROLOGICAL NEGATIVE: 1
CHILLS: 0
NAUSEA: 0
FEVER: 0
PALPITATIONS: 0
SHORTNESS OF BREATH: 0
ABDOMINAL PAIN: 0
EYES NEGATIVE: 1
MUSCULOSKELETAL NEGATIVE: 1
CONSTITUTIONAL NEGATIVE: 1
CARDIOVASCULAR NEGATIVE: 1
SORE THROAT: 0
VOMITING: 0
DEPRESSION: 0
GASTROINTESTINAL NEGATIVE: 1
SPUTUM PRODUCTION: 0
RESPIRATORY NEGATIVE: 1
PSYCHIATRIC NEGATIVE: 1

## 2018-10-28 ASSESSMENT — PAIN SCALES - GENERAL
PAINLEVEL_OUTOF10: 3
PAINLEVEL_OUTOF10: 4
PAINLEVEL_OUTOF10: 5
PAINLEVEL_OUTOF10: 4
PAINLEVEL_OUTOF10: 5
PAINLEVEL_OUTOF10: 3
PAINLEVEL_OUTOF10: 4
PAINLEVEL_OUTOF10: 5
PAINLEVEL_OUTOF10: 4
PAINLEVEL_OUTOF10: 2
PAINLEVEL_OUTOF10: 3
PAINLEVEL_OUTOF10: 3

## 2018-10-28 ASSESSMENT — PATIENT HEALTH QUESTIONNAIRE - PHQ9
2. FEELING DOWN, DEPRESSED, IRRITABLE, OR HOPELESS: NOT AT ALL
1. LITTLE INTEREST OR PLEASURE IN DOING THINGS: NOT AT ALL
SUM OF ALL RESPONSES TO PHQ9 QUESTIONS 1 AND 2: 0
SUM OF ALL RESPONSES TO PHQ9 QUESTIONS 1 AND 2: 0
1. LITTLE INTEREST OR PLEASURE IN DOING THINGS: NOT AT ALL
2. FEELING DOWN, DEPRESSED, IRRITABLE, OR HOPELESS: NOT AT ALL

## 2018-10-28 ASSESSMENT — LIFESTYLE VARIABLES: ALCOHOL_USE: NO

## 2018-10-28 NOTE — CARE PLAN
Problem: Post Op Day 4 CABG/Heart Valve Replacement  Goal: Optimal care of the Post Op CABG/Heart Valve replacement Post Op Day 4  Outcome: PROGRESSING AS EXPECTED    Intervention: Daily Weights  With AM walk    Intervention: Shower daily and clean incisions twice daily with soap and water  Patient showered, CHG wipes done    Intervention: Up in chair for all meals  Up for all meals    Intervention: Ambulate, increasing the distance each time x 3 and before bed  Increased distance with each walk    Intervention: IS q 1 hour while awake and record best IS volume  IS 1300    Intervention: Consider removal of durán, chest tube and pacer wire if not already done  Complete

## 2018-10-28 NOTE — PROGRESS NOTES
Cardiovascular Surgery Progress Note    Name: Santi Landers  MRN: [id number]  : 1955  Admit Date: 10/24/2018  5:05 AM  Procedure:  Procedure(s) and Anesthesia Type:     * MULTIPLE CORONARY ARTERY BYPASS x 3 ENDO VEIN HARVEST- RIGHT LEG, BILATERAL ANTERIOR MAMMARY ARTERY HARVEST - General     * LORENA - General  4 Day Post-Op    Vitals:  Patient Vitals for the past 8 hrs:   Temp SpO2 O2 Delivery O2 (LPM) Pulse Resp NIBP Weight   10/28/18 0800 36.8 °C (98.3 °F) 99 % Silicone Nasal Cannula 2 95 16 103/63 -   10/28/18 0600 - 96 % - - (!) 110 - 113/83 105.3 kg (232 lb 2.3 oz)   10/28/18 0400 - 97 % Silicone Nasal Cannula 2 (!) 102 - 136/75 -   10/28/18 0300 - 98 % - - 100 - - -     Temp (24hrs), Av.8 °C (98.3 °F), Min:36.8 °C (98.3 °F), Max:36.8 °C (98.3 °F)      Respiratory:    Respiration: 16, Pulse Oximetry: 99 %, O2 Daily Delivery Respiratory : Silicone Nasal Cannula     Chest Tube Drains:          Fluids:    Intake/Output Summary (Last 24 hours) at 10/28/18 1054  Last data filed at 10/28/18 0900   Gross per 24 hour   Intake             2600 ml   Output             2815 ml   Net             -215 ml     Admit weight: Weight: 96.9 kg (213 lb 10 oz)  Current weight: Weight: 105.3 kg (232 lb 2.3 oz) (10/28/18 0600)    Labs:  Recent Labs      10/26/18   0430  10/27/18   0554  10/28/18   0400   WBC  15.2*  10.2  6.9   RBC  3.23*  2.99*  3.12*   HEMOGLOBIN  11.1*  10.4*  11.3*   HEMATOCRIT  31.9*  29.1*  30.6*   MCV  98.8*  97.3  98.1*   MCH  34.4*  34.8*  36.2*   MCHC  34.8  35.7*  36.9*   RDW  43.8  41.1  43.1   PLATELETCT  115*  115*  141*   MPV  9.4  9.3  9.6         Recent Labs      10/26/18   0430  10/27/18   0554  10/28/18   0400   SODIUM  126*  130*  134*   POTASSIUM  4.6  4.0  3.3*   CHLORIDE  95*  97  96   CO2  26  29  30   GLUCOSE  138*  131*  117*   BUN  17  17  18   CREATININE  0.63  0.64  0.73   CALCIUM  8.2*  8.6  8.7           Medications:  • magnesium sulfate  2 g     • potassium chloride SA  20 mEq      • potassium chloride SA  20 mEq     • polyethylene glycol/lytes  1 Packet     • metoclopramide  5 mg     • furosemide  20 mg     • atorvastatin  80 mg     • gabapentin  100 mg     • senna-docusate  2 Tab     • enoxaparin  40 mg     • mupirocin  1 Application     • metoprolol  25 mg     • aspirin EC  81 mg     • clopidogrel  75 mg          Ordered Medications:    ASA - Yes    Plavix - Yes    Post-operative Beta Blockers - Yes    Ace Inhibitor - No; contraindicated because of Other ef normal    Statin - Yes          Central Line:  Type of line: CVP  Date of insertion: 10/24/2018  Date of removal:     Exam:   Review of Systems   Constitutional: Negative.    HENT: Negative.    Eyes: Negative.    Respiratory: Negative.    Cardiovascular: Negative.    Gastrointestinal: Negative.    Genitourinary: Negative.    Musculoskeletal: Negative.    Skin: Negative.    Neurological: Negative.    Psychiatric/Behavioral: Negative.        Physical Exam   Constitutional: He is oriented to person, place, and time. He appears well-developed and well-nourished.   HENT:   Head: Normocephalic and atraumatic.   Eyes: Pupils are equal, round, and reactive to light.   Neck: Normal range of motion. Neck supple.   Cardiovascular: Normal rate and regular rhythm.    Pulmonary/Chest: Effort normal.   Decreased at bases.    Abdominal: Soft. Bowel sounds are normal.   Musculoskeletal: Normal range of motion.   Neurological: He is alert and oriented to person, place, and time.   Skin: Skin is warm and dry.   Wounds CDI   Psychiatric: He has a normal mood and affect.       Quality Measures:   Quality-Core Measures   Reviewed items::  EKG reviewed, Radiology images reviewed, Labs reviewed and Medications reviewed  Chavarria catheter::  No Chavarria  Central line in place:  Need for access  DVT prophylaxis pharmacological::  Enoxaparin (Lovenox)  DVT prophylaxis - mechanical:  SCDs  Ulcer Prophylaxis::  Yes  Assessed for rehabilitation services:  Patient  returned to prior level of function, rehabilitation not indicated at this time      Assessment/Plan:  POD 1 Extubated, HDS, no drips, chest tube output minimal and negative for air leak.  Neuro grossly intact.  Plan:  Transition to tele status.  DC chest tubes.  DC durán.  POD 2 HDS, chest tube output minimal, neuro intact, wounds CDI, abd S/NT.  Plan:  Dc chest tubes, blakes and pacing wires.   POD 3 HDS, neuro intact, wounds CDI, abd taunt and hypoactive BS.  SR.  CXR noted.  Plan:  Start reglan today.    POD 4 HDS, neuro intact, wounds CDI, Abd S/NT + BS.  SR.  CXR with stable left apical pneumo.  May have pleural effusion.  Plan:  thoracentesis left side.  Watch pneumo.  Home soon.     Active Hospital Problems    Diagnosis   • Other constipation [K59.09]   • Hypomagnesemia [E83.42]   • Hyponatremia [E87.1]   • Acute respiratory failure with hypoxia (HCC) [J96.01]   • Hypocalcemia [E83.51]   • Coronary artery disease of native artery of native heart with stable angina pectoris (HCC) [I25.118]   • Dyslipidemia [E78.5]

## 2018-10-28 NOTE — PROGRESS NOTES
Critical Care Progress Note    Date of admission  10/24/2018    Chief Complaint  63 y.o. male admitted 10/24/2018 with multivessel cad    Hospital Course    63-year-old gentleman with known past   medical history of chronic hypertension, dyslipidemia, chronic back pain, and   recently diagnosed multivessel coronary artery disease on cardiac   catheterization on 08/29/2018.  Patient admitted today for elective triple   vessel bypass.  ____ time recovering from anesthesia on full mechanical   ventilatory support with infusing epinephrine, insulin and Precedex infusions.    No intraoperative complications noted.  Patient did have bilateral mammary   artery harvest.      Interval Problem Update  Reviewed last 24 hour events:  Tm 98.3  -1L over last 24hr, +7.4L since admit  cxr with L>R bb atx + ? Small apical pneumo on left  Na 134  K 3.3  Mg 1.9    Lasix 20 bid    2L Nc  IS  Last BM pta      Review of Systems  Review of Systems   Constitutional: Negative for chills and fever.   HENT: Negative for congestion and sore throat.    Respiratory: Negative for cough, sputum production and shortness of breath.    Cardiovascular: Positive for chest pain. Negative for palpitations.   Gastrointestinal: Negative for abdominal pain, nausea and vomiting.   Psychiatric/Behavioral: Negative for depression.   All other systems reviewed and are negative.       Vital Signs for last 24 hours   Temp:  [36.3 °C (97.3 °F)-36.8 °C (98.3 °F)] 36.8 °C (98.3 °F)  Pulse:  [] 110  Resp:  [14-27] 16    Hemodynamic parameters for last 24 hours       Respiratory       Physical Exam   Physical Exam   Constitutional: He appears well-developed and well-nourished.   HENT:   Head: Normocephalic and atraumatic.   Eyes: Pupils are equal, round, and reactive to light. No scleral icterus.   Neck: No tracheal deviation present.   Cardiovascular: Intact distal pulses.    Tachy rate   Pulmonary/Chest: He has no wheezes. He has no rales.   Abdominal: He  exhibits distension. There is no tenderness.   Musculoskeletal: He exhibits edema.   Neurological: No cranial nerve deficit.   Skin: Skin is warm and dry.   Psychiatric: He has a normal mood and affect.   Nursing note and vitals reviewed.      Medications  Current Facility-Administered Medications   Medication Dose Route Frequency Provider Last Rate Last Dose   • polyethylene glycol/lytes (MIRALAX) PACKET 1 Packet  1 Packet Oral BID Gallo Smith M.D.   1 Packet at 10/28/18 0507   • metoclopramide (REGLAN) tablet 5 mg  5 mg Oral TID AC Emely Hernandez A.P.N.   5 mg at 10/28/18 0744   • albuterol (PROVENTIL) 2.5mg/0.5ml nebulizer solution 2.5 mg  2.5 mg Nebulization Q4H PRN (RT) Gallo Smith M.D.   2.5 mg at 10/26/18 0610   • furosemide (LASIX) injection 20 mg  20 mg Intravenous BID DIURETIC Gallo Smith M.D.   20 mg at 10/28/18 0505   • atorvastatin (LIPITOR) tablet 80 mg  80 mg Oral DAILY Emely Hernandez A.P.N.   80 mg at 10/28/18 0506   • cyclobenzaprine (FLEXERIL) tablet 5 mg  5 mg Oral TID PRN Emely Hernandez A.P.N.   5 mg at 10/24/18 1924   • gabapentin (NEURONTIN) capsule 100 mg  100 mg Oral TID Emely Hernandez A.P.N.   100 mg at 10/28/18 0505   • Respiratory Care per Protocol   Nebulization Continuous RT Emely Hernandez A.P.N.       • NS infusion   Intravenous Continuous Emely Hernandez A.P.N. 10 mL/hr at 10/24/18 1300     • Pharmacy Consult Request ...Pain Management Review 1 Each  1 Each Other PRN Emely Hernandez A.P.N.       • senna-docusate (PERICOLACE or SENOKOT S) 8.6-50 MG per tablet 2 Tab  2 Tab Oral BID Emely Hernandez A.P.N.   2 Tab at 10/28/18 0506    And   • polyethylene glycol/lytes (MIRALAX) PACKET 1 Packet  1 Packet Oral QDAY PRN Emely Hernandez A.P.N.        And   • magnesium hydroxide (MILK OF MAGNESIA) suspension 30 mL  30 mL Oral QDAY PRN Emely Hernandez, A.P.N.        And   • bisacodyl (DULCOLAX) suppository 10 mg  10 mg Rectal QDAY PRN Emely Hernandez, A.P.N.       • enoxaparin  (LOVENOX) inj 40 mg  40 mg Subcutaneous QDAY Emely Hernandez, A.P.N.   40 mg at 10/27/18 1703   • mupirocin (BACTROBAN) 2 % ointment 1 Application  1 Application Topical BID Emely  David, A.P.N.   1 Application at 10/28/18 0505   • potassium chloride SA (Kdur) tablet 10 mEq  10 mEq Oral BID Emely  David, A.P.N.   10 mEq at 10/28/18 0507   • metoprolol (LOPRESSOR) tablet 25 mg  25 mg Oral BID Emely  David, A.P.N.   25 mg at 10/28/18 0509   • aspirin EC (ECOTRIN) tablet 81 mg  81 mg Oral DAILY Emely  David, A.P.N.   81 mg at 10/28/18 0506   • clopidogrel (PLAVIX) tablet 75 mg  75 mg Oral DAILY Emely  David, A.P.N.   75 mg at 10/28/18 0506   • oxyCODONE immediate-release (ROXICODONE) tablet 5 mg  5 mg Oral Q3HRS PRN Emely Hernandez, A.P.N.   5 mg at 10/28/18 0514   • oxyCODONE immediate release (ROXICODONE) tablet 10 mg  10 mg Oral Q3HRS PRN Emely Hernandez, A.P.N.   10 mg at 10/27/18 1659   • fentaNYL (SUBLIMAZE) injection 50 mcg  50 mcg Intravenous Q3HRS PRN Emely Hernandez, A.P.N.       • tramadol (ULTRAM) 50 MG tablet 50 mg  50 mg Oral Q4HRS PRN Emely Hernandez, A.P.N.   50 mg at 10/25/18 2242   • ondansetron (ZOFRAN) syringe/vial injection 4 mg  4 mg Intravenous Q6HRS PRN Emely Hernandez, A.P.N.   4 mg at 10/26/18 1716    Or   • prochlorperazine (COMPAZINE) injection 10 mg  10 mg Intravenous Q6HRS PRN Emely Hernandez, A.P.N.        Or   • promethazine (PHENERGAN) suppository 25 mg  25 mg Rectal Q6HRS PRN Emely Hernandez, A.P.N.       • acetaminophen (TYLENOL) tablet 650 mg  650 mg Oral Q4HRS PRN Emely Hernandez, A.P.N.        Or   • acetaminophen (TYLENOL) suppository 650 mg  650 mg Rectal Q4HRS PRN Emely Hernandez, A.P.N.       • mag hydrox-al hydrox-simeth (MAALOX PLUS ES or MYLANTA DS) suspension 30 mL  30 mL Oral Q4HRS PRN Emely Hernandez, A.P.N.       • diphenhydrAMINE (BENADRYL) tablet/capsule 25 mg  25 mg Oral HS PRN - MR X 1 Emely Hernandez, A.P.N.           Fluids    Intake/Output Summary (Last 24 hours) at  10/28/18 0748  Last data filed at 10/28/18 0600   Gross per 24 hour   Intake             2450 ml   Output             3490 ml   Net            -1040 ml       Laboratory          Recent Labs      10/26/18   0430  10/27/18   0554  10/28/18   0400   SODIUM  126*  130*  134*   POTASSIUM  4.6  4.0  3.3*   CHLORIDE  95*  97  96   CO2  26  29  30   BUN  17  17  18   CREATININE  0.63  0.64  0.73   MAGNESIUM  1.8  1.9  1.9   CALCIUM  8.2*  8.6  8.7     Recent Labs      10/26/18   0430  10/27/18   0554  10/28/18   0400   GLUCOSE  138*  131*  117*     Recent Labs      10/26/18   0430  10/27/18   0554  10/28/18   0400   WBC  15.2*  10.2  6.9     Recent Labs      10/26/18   0430  10/27/18   0554  10/28/18   0400   RBC  3.23*  2.99*  3.12*   HEMOGLOBIN  11.1*  10.4*  11.3*   HEMATOCRIT  31.9*  29.1*  30.6*   PLATELETCT  115*  115*  141*       Imaging  X-Ray:  No film today    Assessment/Plan  Other constipation   Assessment & Plan    Advance bowel regimen  Continue docusate, miralax bid  Suppository if no bm        Hypomagnesemia   Assessment & Plan    Replace to keep > 2        Hyponatremia   Assessment & Plan    Hypervolemic volume status  Continue lasix  improving        Acute respiratory failure with hypoxia (HCC)   Assessment & Plan    Intubated 10/24-24  Continue rt/02 protocols  IS/PEP/Mobilize  Diurese with lasix        Coronary artery disease of native artery of native heart with stable angina pectoris (HCC)- (present on admission)   Assessment & Plan    S/P 3V Cabg 10/24  Asa/Plavix/Statin/BB  Continue post heart protocols  Continue IS/PEP/Mobilize  Continue lasix        Dyslipidemia- (present on admission)   Assessment & Plan    Continue statin             VTE:  Lovenox  Ulcer: Not Indicated  Lines: Central Line  Ongoing indication addressed    I have performed a physical exam and reviewed and updated ROS and Plan today (10/28/2018). In review of yesterday's note (10/27/2018), there are no changes except as documented  above.     Discussed patient condition and risk of morbidity and/or mortality with RN, RT, Therapies, Pharmacy, Patient and [facility]

## 2018-10-28 NOTE — CARE PLAN
Problem: Post Op Day 3 CABG/Heart Valve replacement  Goal: Optimal care of the post op CABG/Heart Valve replacement post op day 3  Outcome: PROGRESSING AS EXPECTED    Intervention: Daily Weights  Weight taken on stand up scale   Intervention: Shower daily and clean incisions twice daily with soap and water  Incisions cleaned. Pt refused shower   Intervention: Up in chair for all meals  Complete.   Intervention: Ambulate, increasing the distance each time x 3 and before bed  Pt walked 1250 ft.    Intervention: IS q 1 hour while awake and record best IS volume  1250 mL   Intervention: Consider removal of durán, chest tube and pacer wire if not already done  N/A

## 2018-10-28 NOTE — PROGRESS NOTES
Monitor Summary:     Sinus rhythm/tach  HR 's  .16/.08/.32    12 hour cc complete.  2 RN skin check complete.

## 2018-10-28 NOTE — CARE PLAN
Problem: Post Op Day 3 CABG/Heart Valve replacement  Goal: Optimal care of the post op CABG/Heart Valve replacement post op day 3  Outcome: PROGRESSING AS EXPECTED    Intervention: Daily Weights  Weight has been taken for the day  Intervention: Up in chair for all meals  Up to chair for all meals   Intervention: Ambulate, increasing the distance each time x 3 and before bed  Ambulated 4 times today    Intervention: Consider removal of durán, chest tube and pacer wire if not already done  Durán and pacer wires removed       Problem: Safety  Goal: Free from accidental injury  Outcome: PROGRESSING AS EXPECTED  Patient educated on fall precautions    Intervention: Initiate Safety Measures  Treaded socks on, bed in lowest position, personal belongings within reach, patient educated to use call light, and lower bed rails up.   Intervention: Assess for Fall Risk  Fall risk assessed

## 2018-10-28 NOTE — PROGRESS NOTES
Received call from radiology, informed of small left apical pneumothorax. Emely Hernandez paged and informed. Instructed to repeat chest xray in morning.

## 2018-10-29 ENCOUNTER — APPOINTMENT (OUTPATIENT)
Dept: RADIOLOGY | Facility: MEDICAL CENTER | Age: 63
DRG: 235 | End: 2018-10-29
Attending: CLINICAL NURSE SPECIALIST
Payer: COMMERCIAL

## 2018-10-29 VITALS
BODY MASS INDEX: 33.31 KG/M2 | RESPIRATION RATE: 20 BRPM | WEIGHT: 224.87 LBS | OXYGEN SATURATION: 91 % | HEIGHT: 69 IN | TEMPERATURE: 98.7 F | HEART RATE: 102 BPM

## 2018-10-29 PROBLEM — K59.09 OTHER CONSTIPATION: Status: RESOLVED | Noted: 2018-10-28 | Resolved: 2018-10-29

## 2018-10-29 PROBLEM — E83.51 HYPOCALCEMIA: Status: RESOLVED | Noted: 2018-10-25 | Resolved: 2018-10-29

## 2018-10-29 PROBLEM — E87.1 HYPONATREMIA: Status: RESOLVED | Noted: 2018-10-26 | Resolved: 2018-10-29

## 2018-10-29 PROBLEM — E83.42 HYPOMAGNESEMIA: Status: RESOLVED | Noted: 2018-10-27 | Resolved: 2018-10-29

## 2018-10-29 LAB
ANION GAP SERPL CALC-SCNC: 6 MMOL/L (ref 0–11.9)
BUN SERPL-MCNC: 14 MG/DL (ref 8–22)
CALCIUM SERPL-MCNC: 9.2 MG/DL (ref 8.5–10.5)
CHLORIDE SERPL-SCNC: 98 MMOL/L (ref 96–112)
CO2 SERPL-SCNC: 32 MMOL/L (ref 20–33)
CREAT SERPL-MCNC: 0.69 MG/DL (ref 0.5–1.4)
ERYTHROCYTE [DISTWIDTH] IN BLOOD BY AUTOMATED COUNT: 42.1 FL (ref 35.9–50)
GLUCOSE SERPL-MCNC: 114 MG/DL (ref 65–99)
HCT VFR BLD AUTO: 31.7 % (ref 42–52)
HGB BLD-MCNC: 11.2 G/DL (ref 14–18)
MAGNESIUM SERPL-MCNC: 2 MG/DL (ref 1.5–2.5)
MCH RBC QN AUTO: 34.5 PG (ref 27–33)
MCHC RBC AUTO-ENTMCNC: 35.3 G/DL (ref 33.7–35.3)
MCV RBC AUTO: 97.5 FL (ref 81.4–97.8)
PLATELET # BLD AUTO: 187 K/UL (ref 164–446)
PMV BLD AUTO: 8.9 FL (ref 9–12.9)
POTASSIUM SERPL-SCNC: 3.5 MMOL/L (ref 3.6–5.5)
RBC # BLD AUTO: 3.25 M/UL (ref 4.7–6.1)
SODIUM SERPL-SCNC: 136 MMOL/L (ref 135–145)
WBC # BLD AUTO: 7 K/UL (ref 4.8–10.8)

## 2018-10-29 PROCEDURE — 85027 COMPLETE CBC AUTOMATED: CPT

## 2018-10-29 PROCEDURE — A9270 NON-COVERED ITEM OR SERVICE: HCPCS | Performed by: NURSE PRACTITIONER

## 2018-10-29 PROCEDURE — 94668 MNPJ CHEST WALL SBSQ: CPT

## 2018-10-29 PROCEDURE — 700111 HCHG RX REV CODE 636 W/ 250 OVERRIDE (IP): Performed by: INTERNAL MEDICINE

## 2018-10-29 PROCEDURE — A9270 NON-COVERED ITEM OR SERVICE: HCPCS | Performed by: INTERNAL MEDICINE

## 2018-10-29 PROCEDURE — A9270 NON-COVERED ITEM OR SERVICE: HCPCS | Performed by: CLINICAL NURSE SPECIALIST

## 2018-10-29 PROCEDURE — 71045 X-RAY EXAM CHEST 1 VIEW: CPT

## 2018-10-29 PROCEDURE — 99232 SBSQ HOSP IP/OBS MODERATE 35: CPT | Performed by: INTERNAL MEDICINE

## 2018-10-29 PROCEDURE — 83735 ASSAY OF MAGNESIUM: CPT

## 2018-10-29 PROCEDURE — 700102 HCHG RX REV CODE 250 W/ 637 OVERRIDE(OP): Performed by: NURSE PRACTITIONER

## 2018-10-29 PROCEDURE — 80048 BASIC METABOLIC PNL TOTAL CA: CPT

## 2018-10-29 PROCEDURE — 700102 HCHG RX REV CODE 250 W/ 637 OVERRIDE(OP): Performed by: CLINICAL NURSE SPECIALIST

## 2018-10-29 PROCEDURE — 700102 HCHG RX REV CODE 250 W/ 637 OVERRIDE(OP): Performed by: INTERNAL MEDICINE

## 2018-10-29 RX ORDER — FUROSEMIDE 40 MG/1
40 TABLET ORAL DAILY
Qty: 30 TAB | Refills: 3 | Status: SHIPPED | OUTPATIENT
Start: 2018-10-29 | End: 2019-01-04

## 2018-10-29 RX ORDER — POTASSIUM CHLORIDE 20 MEQ/1
40 TABLET, EXTENDED RELEASE ORAL ONCE
Status: COMPLETED | OUTPATIENT
Start: 2018-10-29 | End: 2018-10-29

## 2018-10-29 RX ORDER — METOPROLOL TARTRATE 37.5 MG/1
37.5 TABLET, FILM COATED ORAL 2 TIMES DAILY
Qty: 90 TAB | Refills: 3 | Status: SHIPPED | OUTPATIENT
Start: 2018-10-29 | End: 2019-01-04

## 2018-10-29 RX ORDER — OXYCODONE HYDROCHLORIDE 5 MG/1
5-10 TABLET ORAL EVERY 4 HOURS PRN
Qty: 56 TAB | Refills: 0 | Status: SHIPPED | OUTPATIENT
Start: 2018-10-29 | End: 2018-11-05

## 2018-10-29 RX ORDER — POTASSIUM CHLORIDE 20 MEQ/1
10 TABLET, EXTENDED RELEASE ORAL DAILY
Qty: 30 TAB | Refills: 3 | Status: SHIPPED | OUTPATIENT
Start: 2018-10-29 | End: 2019-01-04

## 2018-10-29 RX ORDER — ASPIRIN 81 MG/1
81 TABLET ORAL DAILY
Qty: 30 TAB | Refills: 3 | Status: ON HOLD | OUTPATIENT
Start: 2018-10-30 | End: 2022-06-29

## 2018-10-29 RX ORDER — CLOPIDOGREL BISULFATE 75 MG/1
75 TABLET ORAL DAILY
Qty: 30 TAB | Refills: 3 | Status: SHIPPED | OUTPATIENT
Start: 2018-10-30 | End: 2019-12-27

## 2018-10-29 RX ADMIN — OXYCODONE HYDROCHLORIDE 5 MG: 5 TABLET ORAL at 05:49

## 2018-10-29 RX ADMIN — METOPROLOL TARTRATE 37.5 MG: 25 TABLET, FILM COATED ORAL at 11:19

## 2018-10-29 RX ADMIN — METOPROLOL TARTRATE 25 MG: 25 TABLET, FILM COATED ORAL at 05:53

## 2018-10-29 RX ADMIN — ASPIRIN 81 MG: 81 TABLET, COATED ORAL at 05:49

## 2018-10-29 RX ADMIN — POTASSIUM CHLORIDE 40 MEQ: 1500 TABLET, EXTENDED RELEASE ORAL at 11:23

## 2018-10-29 RX ADMIN — POTASSIUM CHLORIDE 20 MEQ: 1500 TABLET, EXTENDED RELEASE ORAL at 05:48

## 2018-10-29 RX ADMIN — OXYCODONE HYDROCHLORIDE 5 MG: 5 TABLET ORAL at 11:20

## 2018-10-29 RX ADMIN — FUROSEMIDE 20 MG: 10 INJECTION, SOLUTION INTRAMUSCULAR; INTRAVENOUS at 05:49

## 2018-10-29 RX ADMIN — CLOPIDOGREL 75 MG: 75 TABLET, FILM COATED ORAL at 05:48

## 2018-10-29 RX ADMIN — MUPIROCIN 1 APPLICATION: 20 OINTMENT TOPICAL at 05:50

## 2018-10-29 RX ADMIN — GABAPENTIN 100 MG: 100 CAPSULE ORAL at 05:48

## 2018-10-29 RX ADMIN — ATORVASTATIN CALCIUM 80 MG: 20 TABLET, FILM COATED ORAL at 05:49

## 2018-10-29 RX ADMIN — OXYCODONE HYDROCHLORIDE 5 MG: 5 TABLET ORAL at 01:30

## 2018-10-29 ASSESSMENT — ENCOUNTER SYMPTOMS
FEVER: 0
PSYCHIATRIC NEGATIVE: 1
PALPITATIONS: 0
CONSTITUTIONAL NEGATIVE: 1
SHORTNESS OF BREATH: 0
EYES NEGATIVE: 1
DEPRESSION: 0
NEUROLOGICAL NEGATIVE: 1
CHILLS: 0
COUGH: 0
ABDOMINAL PAIN: 0
SPUTUM PRODUCTION: 0
NAUSEA: 0
CARDIOVASCULAR NEGATIVE: 1
SORE THROAT: 0
RESPIRATORY NEGATIVE: 1
MUSCULOSKELETAL NEGATIVE: 1
VOMITING: 0
GASTROINTESTINAL NEGATIVE: 1

## 2018-10-29 ASSESSMENT — PAIN SCALES - GENERAL
PAINLEVEL_OUTOF10: 5
PAINLEVEL_OUTOF10: 3
PAINLEVEL_OUTOF10: 5
PAINLEVEL_OUTOF10: 5
PAINLEVEL_OUTOF10: 2
PAINLEVEL_OUTOF10: 0
PAINLEVEL_OUTOF10: 5
PAINLEVEL_OUTOF10: 2
PAINLEVEL_OUTOF10: 4
PAINLEVEL_OUTOF10: 3

## 2018-10-29 ASSESSMENT — PATIENT HEALTH QUESTIONNAIRE - PHQ9
1. LITTLE INTEREST OR PLEASURE IN DOING THINGS: NOT AT ALL
2. FEELING DOWN, DEPRESSED, IRRITABLE, OR HOPELESS: NOT AT ALL
SUM OF ALL RESPONSES TO PHQ9 QUESTIONS 1 AND 2: 0

## 2018-10-29 NOTE — DISCHARGE PLANNING
Anticipated Discharge Disposition: d/c home w/ hh    Action: Received order for HH.    Met at bedside and acquired choice: [facility].    Pt lives in [place], [place] and carries both [employer] and Medicare INS.    Pt will use PCP Dr Meli Ramos for HH orders.     Faxed to [facility].    Contacted [facility].    Barriers to Discharge: acceptance,     Plan: f/u w/ [facility], medical team, pt/family

## 2018-10-29 NOTE — PROGRESS NOTES
Cardiovascular Surgery Progress Note    Name: Santi Landers  MRN: [id number]  : 1955  Admit Date: 10/24/2018  5:05 AM  Procedure:  Procedure(s) and Anesthesia Type:     * MULTIPLE CORONARY ARTERY BYPASS x 3 ENDO VEIN HARVEST- RIGHT LEG, BILATERAL ANTERIOR MAMMARY ARTERY HARVEST - General     * LORENA - General  5 Day Post-Op    Vitals:  Patient Vitals for the past 8 hrs:   Temp Pulse Heart Rate (Monitored) Resp NIBP Weight   10/29/18 0600 - - - - - 102 kg (224 lb 13.9 oz)   10/29/18 0400 36.8 °C (98.2 °F) (!) 109 (!) 109 18 123/83 -     Temp (24hrs), Av.9 °C (98.4 °F), Min:36.7 °C (98.1 °F), Max:37 °C (98.6 °F)      Respiratory:    Respiration: 18, Pulse Oximetry: 95 %, O2 Daily Delivery Respiratory : Silicone Nasal Cannula     Chest Tube Drains:          Fluids:    Intake/Output Summary (Last 24 hours) at 10/29/18 09  Last data filed at 10/29/18 0600   Gross per 24 hour   Intake          1672.08 ml   Output             2450 ml   Net          -777.92 ml     Admit weight: Weight: 96.9 kg (213 lb 10 oz)  Current weight: Weight: 102 kg (224 lb 13.9 oz) (10/29/18 0600)    Labs:  Recent Labs      10/27/18   0554  10/28/18   0400  10/29/18   0602   WBC  10.2  6.9  7.0   RBC  2.99*  3.12*  3.25*   HEMOGLOBIN  10.4*  11.3*  11.2*   HEMATOCRIT  29.1*  30.6*  31.7*   MCV  97.3  98.1*  97.5   MCH  34.8*  36.2*  34.5*   MCHC  35.7*  36.9*  35.3   RDW  41.1  43.1  42.1   PLATELETCT  115*  141*  187   MPV  9.3  9.6  8.9*         Recent Labs      10/27/18   0554  10/28/18   0400  10/29/18   0602   SODIUM  130*  134*  136   POTASSIUM  4.0  3.3*  3.5*   CHLORIDE  97  96  98   CO2  29  30  32   GLUCOSE  131*  117*  114*   BUN  17  18  14   CREATININE  0.64  0.73  0.69   CALCIUM  8.6  8.7  9.2           Medications:  • metoprolol  37.5 mg     • potassium chloride SA  40 mEq     • potassium chloride SA  20 mEq     • polyethylene glycol/lytes  1 Packet     • furosemide  20 mg     • atorvastatin  80 mg     • gabapentin  100 mg      • senna-docusate  2 Tab     • enoxaparin  40 mg     • aspirin EC  81 mg     • clopidogrel  75 mg          Ordered Medications:    ASA - Yes    Plavix - Yes    Post-operative Beta Blockers - Yes    Ace Inhibitor - No; contraindicated because of Other ef normal    Statin - Yes          Central Line:  Type of line: CVP  Date of insertion: 10/24/2018  Date of removal:     Exam:   Review of Systems   Constitutional: Negative.    HENT: Negative.    Eyes: Negative.    Respiratory: Negative.    Cardiovascular: Negative.    Gastrointestinal: Negative.    Genitourinary: Negative.    Musculoskeletal: Negative.    Skin: Negative.    Neurological: Negative.    Psychiatric/Behavioral: Negative.        Physical Exam   Constitutional: He is oriented to person, place, and time. He appears well-developed and well-nourished.   HENT:   Head: Normocephalic and atraumatic.   Eyes: Pupils are equal, round, and reactive to light.   Neck: Normal range of motion. Neck supple.   Cardiovascular: Normal rate and regular rhythm.    Pulmonary/Chest: Effort normal.   Decreased at bases.    Abdominal: Soft. Bowel sounds are normal.   Musculoskeletal: Normal range of motion.   Neurological: He is alert and oriented to person, place, and time.   Skin: Skin is warm and dry.   Wounds CDI   Psychiatric: He has a normal mood and affect.       Quality Measures:   Quality-Core Measures   Reviewed items::  EKG reviewed, Radiology images reviewed, Labs reviewed and Medications reviewed  Chavarria catheter::  No Chavarria  Central line in place:  Need for access  DVT prophylaxis pharmacological::  Enoxaparin (Lovenox)  DVT prophylaxis - mechanical:  SCDs  Ulcer Prophylaxis::  Yes  Assessed for rehabilitation services:  Patient returned to prior level of function, rehabilitation not indicated at this time      Assessment/Plan:  POD 1 Extubated, HDS, no drips, chest tube output minimal and negative for air leak.  Neuro grossly intact.  Plan:  Transition to tele  status.  DC chest tubes.  DC durán.  POD 2 HDS, chest tube output minimal, neuro intact, wounds CDI, abd S/NT.  Plan:  Dc chest tubes, blakes and pacing wires.   POD 3 HDS, neuro intact, wounds CDI, abd taunt and hypoactive BS.  SR.  CXR noted.  Plan:  Start reglan today.    POD 4 HDS, neuro intact, wounds CDI, Abd S/NT + BS.  SR.  CXR with stable left apical pneumo.  May have pleural effusion.  Plan:  thoracentesis left side.  Watch pneumo.  Home soon.  POD 5 HDS, NSR/ST, wts trending down, +2 L, On RA at rest, CXR no pneumothorax or effusion to tap.  Has had, BM, surgical incisions CDI. PLAN:  Replace K, increase BB.  RN to do home O2 walking test, will order O2 if needed.  OK for home today with Home Health to follow.      Active Hospital Problems    Diagnosis   • Other constipation [K59.09]   • Hypomagnesemia [E83.42]   • Hyponatremia [E87.1]   • Acute respiratory failure with hypoxia (HCC) [J96.01]   • Hypocalcemia [E83.51]   • Coronary artery disease of native artery of native heart with stable angina pectoris (HCC) [I25.118]   • Dyslipidemia [E78.5]

## 2018-10-29 NOTE — DISCHARGE PLANNING
Anticipated Discharge Disposition: d/c home w/ hh    Action: spoke to [facility] and [facility] requesting hand written hh orders from apn/heart surgery    Left  w/ apn requesting hh hand written orders so pt can be fully accepted by hh    Barriers to Discharge: acceptance to hh    Plan: f/u w/ apn in heart surgery, fax to hh/[facility]

## 2018-10-29 NOTE — CARE PLAN
Problem: Post Op Day 4 CABG/Heart Valve Replacement  Goal: Optimal care of the Post Op CABG/Heart Valve replacement Post Op Day 4  Outcome: PROGRESSING AS EXPECTED    Intervention: Daily Weights  102kg via stand up scale  Intervention: Up in chair for all meals  Up in chair for breakfast  Intervention: Ambulate, increasing the distance each time x 3 and before bed  Ambulated x1 around unit  Intervention: IS q 1 hour while awake and record best IS volume  Done. Best IS 1500  Intervention: Consider removal of durán, chest tube and pacer wire if not already done  Done

## 2018-10-29 NOTE — FLOWSHEET NOTE
10/29/18 0953   Events/Summary/Plan   Events/Summary/Plan On Room Air now!  At 60% IS value, holding steady   Interdisciplinary Plan of Care-Goals (Indications)   Hyperinflation Protocol Indications Chest Trauma (Blunt, Penetrative, or Surgical)   Interdisciplinary Plan of Care-Outcomes    Hyperinflation Protocol Goals/Outcome Stable Vital Capacity x24 hrs and Patient Understands / uses I.S.   Education   Education Yes - Pt. / Family has been Instructed in use of Respiratory Equipment   RT Assessment of Delivered Medications   Evaluation of Medication Delivery Daily Yes-- Pt /Family has been Instructed in use of Respiratory Medications and Adverse Reactions   PEP/CPT Group   #PEP/CPT (Manual) Subsequent Subsequent   PEP/CPT Method Positive Airway Pressure Device   Pressure 20   Incentive Spirometry Group   Incentive Spirometry Instruction Yes   Breathing Exercises Yes   Incentive Spirometer Volume 1600 mL   Respiratory WDL   Respiratory (WDL) X   Chest Exam   Work Of Breathing / Effort Mild   Breath Sounds   RUL Breath Sounds Clear   RML Breath Sounds Clear   RLL Breath Sounds Diminished   TRACI Breath Sounds Clear   LLL Breath Sounds Diminished   Secretions   Cough Non Productive   Oximetry   Continuous Oximetry Yes   O2 Alarms Set & Reviewed Yes

## 2018-10-29 NOTE — DISCHARGE SUMMARY
Discharge Summary    CHIEF COMPLAINT ON ADMISSION  No chief complaint on file.    PREOPERATIVE DIAGNOSES:  Severe multivessel coronary artery disease and   Angina, chronic pain, HTN, dyslipidemia     POSTOPERATIVE DIAGNOSES:  Severe multivessel coronary artery disease and   Angina. Chronic pain, dyslipidemia, HTN, acute post operative pain     PROCEDURES PERFORMED:  Coronary artery bypass grafting x3 (right internal   mammary artery to mid left anterior descending artery, left internal mammary   artery to first obtuse marginal branch, reverse saphenous vein graft to first   diagonal branch), endoscopic vein harvesting, insertion of left femoral   arterial line, and transesophageal echocardiography.  Reason for Admission  Coronary artery disease     Admission Date  10/24/2018    CODE STATUS  Full Code    HPI & HOSPITAL COURSE  This is a 62-year-old male who presented with   symptoms of angina, an abnormal myocardial perfusion study and was found to   have severe multivessel coronary artery disease on coronary angiogram.  The   angiogram revealed a long calcified 80% proximal LAD stenosis after a   medium-sized diagonal branch with 70% mid stenosis.  There was a 70-80% mid   left circumflex lesion.  The patient was subsequently referred to me for   consideration for coronary bypass grafting.      POD 1 Extubated, HDS, no drips, chest tube output minimal and negative for air leak.  Neuro grossly intact.  Plan:  Transition to tele status.  DC chest tubes.  DC durán.  POD 2 HDS, chest tube output minimal, neuro intact, wounds CDI, abd S/NT.  Plan:  Dc chest tubes, blakes and pacing wires.   POD 3 HDS, neuro intact, wounds CDI, abd taunt and hy/poactive BS.  SR.  CXR noted.  Plan:  Start reglan today.    POD 4 HDS, neuro intact, wounds CDI, Abd S/NT + BS.  SR.  CXR with stable left apical pneumo.  May have pleural effusion.  Plan:  thoracentesis left side.  Watch pneumo.  Home soon.  POD 5 HDS, NSR/ST, wts trending down,  +2 L, On RA at rest, CXR no pneumothorax or effusion to tap.  Has had, BM, surgical incisions CDI. PLAN:  Replace K, increase BB.  RN to do home O2 walking test, will order O2 if needed.  OK for home today with Home Health to follow.      Therefore, he is discharged in good and stable condition to home with close outpatient follow-up.    The patient met 2-midnight criteria for an inpatient stay at the time of discharge.    Discharge Date  10/29/18    FOLLOW UP ITEMS POST DISCHARGE  Cardiology 1-2 weeks  Cardiothoracic Surgery 1 month  PCP as previously directed    DISCHARGE DIAGNOSES  Active Problems:    Dyslipidemia POA: Yes    Coronary artery disease of native artery of native heart with stable angina pectoris (HCC) POA: Yes    Acute respiratory failure with hypoxia (HCC) POA: Unknown    Hypocalcemia POA: Unknown    Hyponatremia POA: Unknown    Hypomagnesemia POA: Unknown    Other constipation POA: Unknown  Resolved Problems:    * No resolved hospital problems. *      FOLLOW UP  Future Appointments  Date Time Provider Department Center   11/9/2018 3:45 PM POOJA Blanco. [facility] None   1/9/2019 4:00 PM Maciej Palacios M.D. [facility] None     Berhane Canela M.D.  [address] [place] [id number]  [phone]    Go on 11/26/2018  Please follow up with your cardiac surgeon Dr. Canela on 11/26 at 2:45pm      MEDICATIONS ON DISCHARGE     Medication List      START taking these medications      Instructions   clopidogrel 75 MG Tabs  Commonly known as:  PLAVIX   Take 1 Tab by mouth every day.  Dose:  75 mg     furosemide 40 MG Tabs  Commonly known as:  LASIX   Take 1 Tab by mouth every day.  Dose:  40 mg     Metoprolol Tartrate 37.5 MG Tabs   Take 37.5 mg by mouth 2 Times a Day.  Dose:  37.5 mg     oxyCODONE immediate-release 5 MG Tabs  Commonly known as:  ROXICODONE   Take 1-2 Tabs by mouth every four hours as needed for Severe Pain for up to 7 days.  Dose:  5-10 mg     potassium chloride SA 20 MEQ  Tbcr  Commonly known as:  Kdur   Take 0.5 Tabs by mouth every day.  Dose:  10 mEq        CHANGE how you take these medications      Instructions   aspirin 81 MG EC tablet  What changed:  · medication strength  · See the new instructions.   Take 1 Tab by mouth every day.  Dose:  81 mg        CONTINUE taking these medications      Instructions   amLODIPine 5 MG Tabs  Commonly known as:  NORVASC   Take 5 mg by mouth every day.  Dose:  5 mg     atorvastatin 80 MG tablet  Commonly known as:  LIPITOR   Take 1 Tab by mouth every day.  Dose:  80 mg     cyclobenzaprine 10 MG Tabs  Commonly known as:  FLEXERIL   TAKE 1 TABLET BY MOUTH EVERYDAY AT BEDTIME     gabapentin 300 MG Caps  Commonly known as:  NEURONTIN   TAKE 300MG TWICE A DAY     lidocaine 5 % Ptch  Commonly known as:  LIDODERM   PLACE 1-3 PATCHES ON THE SKIN ANYWHERE YOU HAVE PAIN. WEAR 12 HOURS ON AND 12 HOURS OFF     therapeutic multivitamin-minerals Tabs   Take 1 Tab by mouth every day.  Dose:  1 Tab        STOP taking these medications    ibuprofen 200 MG Tabs  Commonly known as:  MOTRIN     lisinopril-hydrochlorothiazide 20-12.5 MG per tablet  Commonly known as:  PRINZIDE, ZESTORETIC     metoprolol SR 25 MG Tb24  Commonly known as:  TOPROL XL     nitroglycerin 0.4 MG Subl  Commonly known as:  NITROSTAT          Use of narcotics and risks associated reviewed with patient.  [place]  checked. Does receiev [place] Rx from PCP, chronic back pain   Discussed weaning, alternatives and office policy on refills  Patient verbalizes understanding.    Allergies  No Known Allergies    DIET  Orders Placed This Encounter   Procedures   • Diet Order Cardiac, Consistent Carbohydrate     Standing Status:   Standing     Number of Occurrences:   1     Order Specific Question:   Diet:     Answer:   Cardiac [6]     Order Specific Question:   Diet:     Answer:   Consistent Carbohydrate [4]       ACTIVITY  As tolerated.  Weight bearing as tolerated   No Driving x 1 month   No heavy  lifting/pushing/pulling >10# x 2 months    Prevena dressing to be removed on POD 7 or 8, or when battery dies  Wash incisions soap/water, pat dry, do not use ointments  Report s/s infection including warmth/redness/oozing or fever/chills    LABORATORY  Lab Results   Component Value Date    SODIUM 136 10/29/2018    POTASSIUM 3.5 (L) 10/29/2018    CHLORIDE 98 10/29/2018    CO2 32 10/29/2018    GLUCOSE 114 (H) 10/29/2018    BUN 14 10/29/2018    CREATININE 0.69 10/29/2018        Lab Results   Component Value Date    WBC 7.0 10/29/2018    HEMOGLOBIN 11.2 (L) 10/29/2018    HEMATOCRIT 31.7 (L) 10/29/2018    PLATELETCT 187 10/29/2018      Discussed when to seek emergency medical care  Questions and concerns addressed.  Patient verbalizes understanding and agrees to plan of care    Total time of the discharge process exceeds 40 minutes.

## 2018-10-29 NOTE — FACE TO FACE
Face to Face Supporting Documentation - Home Health    The encounter with this patient was in whole or in part the primary reason for home health admission.    Date of encounter:   Patient:                    MRN:                       YOB: 2020  Santi Landers  [id number]  1955     Home health to see patient for:  Wound Care    Skilled need for:  Surgical Aftercare s/p cabg    Skilled nursing interventions to include:  Wound Care    Homebound status evidenced by:  Needs the assistance of another person in order to leave the home. Leaving home requires a considerable and taxing effort. There is a normal inability to leave the home.    Community Physician to provide follow up care: Meli Ramos M.D.     Optional Interventions? No      I certify the face to face encounter for this home health care referral meets the CMS requirements and the encounter/clinical assessment with the patient was, in whole, or in part, for the medical condition(s) listed above, which is the primary reason for home health care. Based on my clinical findings: the service(s) are medically necessary, support the need for home health care, and the homebound criteria are met.  I certify that this patient has had a face to face encounter by myself.  TRUE Lozano. - NPI: [id number]

## 2018-10-29 NOTE — DISCHARGE PLANNING
Anticipated Discharge Disposition: pt to d/c home w/ hh    Action: LSW noted pt is d/cing now.    LSw requested [facility] f/u w/ [facility] to see if pt is accepted.    LSw spoke to bedside RN questioning if MD is comfortable w/ pt d/cing home before he is officially accepted to HH.     Pt has HH contact number and will f/u once arrives home. Pt was contacted by Cardiac Rehab and he is accepted.     Barriers to Discharge: TBD    Plan: pt d/cing home and aware he is not accepted by HH yet

## 2018-10-29 NOTE — CARE PLAN
Problem: Post Op Day 4 CABG/Heart Valve Replacement  Goal: Optimal care of the Post Op CABG/Heart Valve replacement Post Op Day 4  Outcome: PROGRESSING AS EXPECTED    Intervention: Daily Weights  With AM walk    Intervention: Shower daily and clean incisions twice daily with soap and water  Complete    Intervention: Up in chair for all meals  Up  For all meals  Intervention: Ambulate, increasing the distance each time x 3 and before bed  Increased distance each time    Intervention: IS q 1 hour while awake and record best IS volume  1500 best    Intervention: Consider removal of durán, chest tube and pacer wire if not already done  Complete    Intervention: Discharge Education  Provided by marjorie MARKS and by JESUS

## 2018-10-29 NOTE — DISCHARGE PLANNING
Received Choice form at 2529  Agency/Facility Name: [facility]  Referral sent per Choice form @ [phone]

## 2018-10-29 NOTE — DISCHARGE PLANNING
Received call from Vania at [facility], they have accepted patient, but will not be able to see patient until Thursday. Discharge Summary has been faxed to [facility] at [phone].  EDDIE Puente advised.  Vania also has requested final orders for patient.

## 2018-10-30 NOTE — PROGRESS NOTES
Critical Care Progress Note    Date of admission  10/24/2018    Chief Complaint  63 y.o. male admitted 10/24/2018 with multivessel cad    Hospital Course    63-year-old gentleman with known past   medical history of chronic hypertension, dyslipidemia, chronic back pain, and   recently diagnosed multivessel coronary artery disease on cardiac   catheterization on 08/29/2018. Pt underwent CABG x3.      No intraoperative complications noted.       Interval Problem Update  Reviewed last 24 hour events:  No acute events overnight.   Afebrile  Walked around the unit few times  Alert, oriented.   Tolerating oral intake      Review of Systems  Review of Systems   Constitutional: Negative for chills and fever.   HENT: Negative for congestion and sore throat.    Respiratory: Negative for cough, sputum production and shortness of breath.    Cardiovascular: Positive for chest pain. Negative for palpitations.   Gastrointestinal: Negative for abdominal pain, nausea and vomiting.   Psychiatric/Behavioral: Negative for depression.   All other systems reviewed and are negative.       Vital Signs for last 24 hours   Temp:  [36.7 °C (98.1 °F)-37.1 °C (98.7 °F)] 37.1 °C (98.7 °F)  Pulse:  [] 102  Resp:  [18-25] 20    Hemodynamic parameters for last 24 hours       Respiratory       Physical Exam   Physical Exam   Constitutional: He appears well-developed and well-nourished. No distress.   HENT:   Head: Normocephalic and atraumatic.   Eyes: Pupils are equal, round, and reactive to light. No scleral icterus.   Neck: No tracheal deviation present.   Cardiovascular: Intact distal pulses.    Tachy rate   Pulmonary/Chest: He has no wheezes. He has no rales.   Abdominal: He exhibits no distension. There is no tenderness.   Musculoskeletal: He exhibits edema.   Neurological: No cranial nerve deficit.   Skin: Skin is warm and dry. He is not diaphoretic.   Psychiatric: He has a normal mood and affect.   Nursing note and vitals  reviewed.      Medications  No current facility-administered medications for this encounter.      Current Outpatient Prescriptions   Medication Sig Dispense Refill   • [START ON 10/30/2018] aspirin EC 81 MG EC tablet Take 1 Tab by mouth every day. 30 Tab 3   • [START ON 10/30/2018] clopidogrel (PLAVIX) 75 MG Tab Take 1 Tab by mouth every day. 30 Tab 3   • metoprolol 37.5 MG Tab Take 37.5 mg by mouth 2 Times a Day. 90 Tab 3   • oxyCODONE immediate-release (ROXICODONE) 5 MG Tab Take 1-2 Tabs by mouth every four hours as needed for Severe Pain for up to 7 days. 56 Tab 0   • furosemide (LASIX) 40 MG Tab Take 1 Tab by mouth every day. 30 Tab 3   • potassium chloride SA (KDUR) 20 MEQ Tab CR Take 0.5 Tabs by mouth every day. 30 Tab 3   • amLODIPine (NORVASC) 5 MG Tab Take 5 mg by mouth every day.     • therapeutic multivitamin-minerals (THERAGRAN-M) Tab Take 1 Tab by mouth every day.     • atorvastatin (LIPITOR) 80 MG tablet Take 1 Tab by mouth every day. 90 Tab 3   • gabapentin (NEURONTIN) 300 MG Cap TAKE 300MG TWICE A DAY     • lidocaine (LIDODERM) 5 % Patch PLACE 1-3 PATCHES ON THE SKIN ANYWHERE YOU HAVE PAIN. WEAR 12 HOURS ON AND 12 HOURS OFF  0   • cyclobenzaprine (FLEXERIL) 10 MG Tab TAKE 1 TABLET BY MOUTH EVERYDAY AT BEDTIME  0       Fluids    Intake/Output Summary (Last 24 hours) at 10/29/18 1822  Last data filed at 10/29/18 1200   Gross per 24 hour   Intake             1300 ml   Output             2950 ml   Net            -1650 ml       Laboratory          Recent Labs      10/27/18   0554  10/28/18   0400  10/29/18   0602   SODIUM  130*  134*  136   POTASSIUM  4.0  3.3*  3.5*   CHLORIDE  97  96  98   CO2  29  30  32   BUN  17  18  14   CREATININE  0.64  0.73  0.69   MAGNESIUM  1.9  1.9  2.0   CALCIUM  8.6  8.7  9.2     Recent Labs      10/27/18   0554  10/28/18   0400  10/29/18   0602   GLUCOSE  131*  117*  114*     Recent Labs      10/27/18   0554  10/28/18   0400  10/29/18   0602   WBC  10.2  6.9  7.0     Recent  Labs      10/27/18   0554  10/28/18   0400  10/29/18   0602   RBC  2.99*  3.12*  3.25*   HEMOGLOBIN  10.4*  11.3*  11.2*   HEMATOCRIT  29.1*  30.6*  31.7*   PLATELETCT  115*  141*  187       Imaging  X-Ray:  No film today    Assessment/Plan  Coronary artery disease of native artery of native heart with stable angina pectoris (HCC)- (present on admission)   Assessment & Plan    S/P 3V Cabg 10/24  Asa/Plavix/Statin/BB  Continue post heart protocols  Continue IS/PEP/Mobilize  Continue lasix  Doing well         Acute respiratory failure with hypoxia (HCC)   Assessment & Plan    Intubated 10/24-24  Continue rt/02 protocols  IS/PEP/Mobilize  Diurese with lasix  Resolved        Dyslipidemia- (present on admission)   Assessment & Plan    On statin           Doing well  Being discharged home today.     VTE:  Lovenox  Ulcer: Not Indicated  Lines: discontinue line    I have performed a physical exam and reviewed and updated ROS and Plan today (10/29/2018). In review of yesterday's note (10/28/2018), there are no changes except as documented above.     Discussed patient condition and risk of morbidity and/or mortality with RN, RT, Therapies, Pharmacy, Patient and [facility]    Time spent: 35 mins    Waldo Nye D.O.  Critical Care

## 2018-10-30 NOTE — DISCHARGE PLANNING
Agency/Facility Name: [facility]  Spoke To: Gabrielle  Outcome: Patient is all set and they will see him.    EDDIE Puente informed.

## 2018-11-01 ENCOUNTER — TELEPHONE (OUTPATIENT)
Dept: CARDIOLOGY | Facility: MEDICAL CENTER | Age: 63
End: 2018-11-01

## 2018-11-01 NOTE — TELEPHONE ENCOUNTER
staple removal orders   Received: Today   Message Contents   Gita Grace R.N.   Phone Number: [phone]             JURGEN/rianna Tellez, [facility] Nurse, calling for order for staple removal.  Rosalinda said to please leave word with the nurse who answers, and if it's necessary the surgeon gives the order, kindly advise.  Please call .      Attempted to contact Rosalinda, no answer. LVM stating staple removal order will need to come from the surgeon Dr. Berhane Canela.

## 2018-11-02 ENCOUNTER — TELEPHONE (OUTPATIENT)
Dept: CARDIOLOGY | Facility: MEDICAL CENTER | Age: 63
End: 2018-11-02

## 2018-11-02 NOTE — TELEPHONE ENCOUNTER
"circulation issue with legs   Received: Today   Message Contents   FERCHO Lemus/Carol Ann       Patient is having circulation issues with his legs, not sure it can wait for next week''s appt. He can be reached at [phone].      Contact patient, he states \" since he got discharged the swelling has gone down\" and \"he's lost wait but when he climbs in bed his feet \"catch on fire\"  Bilateral feet effected.      Denies pain at the bilateral popliteal sites. States swelling going down every day.  R > L   Right leg with graft site.  Patient states still swelling and tender to touch.      He started feeling these symptoms the night he got home.  He denies feeling this in the hospital.     He denies feeling the symptom during the day. He states he's \"very busy\" during the day. When he sits on the couch he denies these symptoms.  When he elevates his legs he feels it.  He wears compression socks during the day and usually takes them off \"around 6pm\".    Discussed elevating his legs up in bed, warm pad on his lower legs/feet.  Elevate frequently during the day.  Observe over the weekend and on Monday call clinic for any worsening symptoms.     Lengthy discussion on blood clots and s/s to look for. discussed s/s of chest pain, chest pressure, shortness of breath, difficulty breathing and when to initiate EMS.  Patient verbalizes understanding.         "

## 2018-11-09 ENCOUNTER — OFFICE VISIT (OUTPATIENT)
Dept: CARDIOLOGY | Facility: MEDICAL CENTER | Age: 63
End: 2018-11-09
Payer: COMMERCIAL

## 2018-11-09 VITALS
SYSTOLIC BLOOD PRESSURE: 112 MMHG | HEIGHT: 69 IN | BODY MASS INDEX: 31.35 KG/M2 | DIASTOLIC BLOOD PRESSURE: 62 MMHG | HEART RATE: 106 BPM | WEIGHT: 211.64 LBS | OXYGEN SATURATION: 96 %

## 2018-11-09 DIAGNOSIS — I25.118 CORONARY ARTERY DISEASE OF NATIVE ARTERY OF NATIVE HEART WITH STABLE ANGINA PECTORIS (HCC): ICD-10-CM

## 2018-11-09 DIAGNOSIS — E78.5 DYSLIPIDEMIA: ICD-10-CM

## 2018-11-09 DIAGNOSIS — I10 ESSENTIAL HYPERTENSION, BENIGN: ICD-10-CM

## 2018-11-09 PROBLEM — I20.0 UNSTABLE ANGINA PECTORIS (HCC): Status: RESOLVED | Noted: 2018-08-23 | Resolved: 2018-11-09

## 2018-11-09 PROBLEM — R93.1 ABNORMAL NUCLEAR CARDIAC IMAGING TEST: Status: RESOLVED | Noted: 2018-08-23 | Resolved: 2018-11-09

## 2018-11-09 LAB — EKG IMPRESSION: NORMAL

## 2018-11-09 PROCEDURE — 93000 ELECTROCARDIOGRAM COMPLETE: CPT | Performed by: INTERNAL MEDICINE

## 2018-11-09 PROCEDURE — 99214 OFFICE O/P EST MOD 30 MIN: CPT | Performed by: NURSE PRACTITIONER

## 2018-11-09 RX ORDER — HYDROCODONE BITARTRATE AND ACETAMINOPHEN 5; 325 MG/1; MG/1
TABLET ORAL
COMMUNITY
End: 2019-12-27

## 2018-11-09 ASSESSMENT — ENCOUNTER SYMPTOMS
COUGH: 0
MYALGIAS: 0
CLAUDICATION: 0
PALPITATIONS: 0
SHORTNESS OF BREATH: 0
DIZZINESS: 0
FEVER: 0
PND: 0
ABDOMINAL PAIN: 0
ORTHOPNEA: 0
BACK PAIN: 1

## 2018-11-10 NOTE — PROGRESS NOTES
Chief Complaint   Patient presents with   • Coronary Artery Disease     Subjective:   Santi Landers is a 63 y.o. male who presents today for hospital follow up S/P CABG X (AI-LAD, LIMA-OM, RSVG-diag).    He is a patient of Dr. Palacios in our office.    Hx of CAD with recent CABG X3, HLD, HTN, and chronic back pain.    He lives in [place], [place]. His wife is in the apt today with him. He is eager to get back to walking the river and driving. He overall feels good.    Stitches in his chest tube sites were removed today on exam.    He has only mild JANG and edema in his right ankle where the vein graft was.    He has a slightly fast HR today. EKG confirms SR.    His incisions are healing well.    He has had no episodes of chest pain, palpitations, dizziness/lightheadedness, or orthopnea..    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;  Service: Cardiac   • LORENA  10/24/2018    Procedure: LORENA;  Surgeon: Berhane Canela M.D.;  Location: SURGERY [facility];  Service: Cardiac   • CHOLECYSTECTOMY  1989     History reviewed. No pertinent family history.  Social History     Social History   • Marital status:      Spouse name: N/A   • Number of children: N/A   • Years of education: N/A     Occupational History   • Not on file.     Social History Main Topics   • Smoking status: Never Smoker   • Smokeless tobacco: Never Used   • Alcohol use Yes      Comment: 2-3 drinks about 5 days a week   • Drug use: No   • Sexual  "activity: Not on file     Other Topics Concern   • Not on file     Social History Narrative   • No narrative on file     No Known Allergies  Outpatient Encounter Prescriptions as of 11/9/2018   Medication Sig Dispense Refill   • HYDROcodone-acetaminophen (NORCO) 5-325 MG Tab per tablet Take 1 Tablet by mouth every 4 hours as needed for Pain, Moderate.     • aspirin EC 81 MG EC tablet Take 1 Tab by mouth every day. 30 Tab 3   • clopidogrel (PLAVIX) 75 MG Tab Take 1 Tab by mouth every day. 30 Tab 3   • metoprolol 37.5 MG Tab Take 37.5 mg by mouth 2 Times a Day. 90 Tab 3   • furosemide (LASIX) 40 MG Tab Take 1 Tab by mouth every day. 30 Tab 3   • potassium chloride SA (KDUR) 20 MEQ Tab CR Take 0.5 Tabs by mouth every day. 30 Tab 3   • amLODIPine (NORVASC) 5 MG Tab Take 5 mg by mouth every day.     • therapeutic multivitamin-minerals (THERAGRAN-M) Tab Take 1 Tab by mouth every day.     • atorvastatin (LIPITOR) 80 MG tablet Take 1 Tab by mouth every day. 90 Tab 3   • gabapentin (NEURONTIN) 300 MG Cap TAKE 300MG TWICE A DAY     • lidocaine (LIDODERM) 5 % Patch PLACE 1-3 PATCHES ON THE SKIN ANYWHERE YOU HAVE PAIN. WEAR 12 HOURS ON AND 12 HOURS OFF  0   • cyclobenzaprine (FLEXERIL) 10 MG Tab TAKE 1 TABLET BY MOUTH EVERYDAY AT BEDTIME  0     No facility-administered encounter medications on file as of 11/9/2018.      Review of Systems   Constitutional: Negative for fever and malaise/fatigue.   Respiratory: Negative for cough and shortness of breath.    Cardiovascular: Positive for leg swelling. Negative for chest pain, palpitations, orthopnea, claudication and PND.        Worse on R ankle than L   Gastrointestinal: Negative for abdominal pain.   Musculoskeletal: Positive for back pain. Negative for myalgias.   Neurological: Negative for dizziness.        Objective:   /62 (BP Location: Right arm, Patient Position: Sitting, BP Cuff Size: Adult)   Pulse (!) 106   Ht 1.753 m (5' 9\")   Wt 96 kg (211 lb 10.3 oz)   SpO2 " 96%   BMI 31.25 kg/m²     Physical Exam   Constitutional: He appears well-developed and well-nourished.   HENT:   Head: Normocephalic and atraumatic.   Eyes: EOM are normal.   Neck: No JVD present.   Cardiovascular: Normal rate, regular rhythm, normal heart sounds and intact distal pulses.    Pulmonary/Chest: Effort normal and breath sounds normal.   Musculoskeletal: Normal range of motion. He exhibits edema.   Mild edema in R ankle   Skin: Skin is warm and dry.   Psychiatric: He has a normal mood and affect.   Nursing note and vitals reviewed.      Assessment:     1. Coronary artery disease of native artery of native heart with stable angina pectoris (HCC)  EKG    COMP METABOLIC PANEL    LIPID PANEL    MAGNESIUM    CBC WITHOUT DIFFERENTIAL   2. Dyslipidemia     3. Essential hypertension, benign       Medical Decision Making:  Today's Assessment / Status / Plan:     1. S/P CABG X3  -no angina, mild JANG  -incisions healing well, stitches removed  -edema mild, wean off lasix in 2 weeks  -cardiac rehab in [place]  -pain controlled, surgeon apt in a month  -cont asa lifelong, statin, bb  -follow HR at home, call if remains elevated, then increase metoprolol to 50 mg BID  -cut amlodipine to 2.5 mg QD for LE edema, follow at home, okay to dc if BP stable    2. HLD  -statin  -LDL goal <70 with CAD  -check cmp and lipid in 3 months    3. HTN  -great control  -cut back medications as above    FU in clinic in 3 months with SW with labs    Patient verbalizes understanding and agrees with the plan of care.     Collaborating MD: NO ADD TODAY

## 2018-11-12 ENCOUNTER — TELEPHONE (OUTPATIENT)
Dept: CARDIOLOGY | Facility: MEDICAL CENTER | Age: 63
End: 2018-11-12

## 2018-11-12 NOTE — TELEPHONE ENCOUNTER
"pain pills   Received: Today   Message Contents   Gita Shelby R.N.   Phone Number: [phone]             SC/sandro     (This message was on our answering service report this morning, I just called you about it to get your advice)  It came in over the weekend to the ans huong.       \"Patient had triple bypass surgery on 10/24 and does not have enough pain pills to get thru the weekend. He is requesting a call from the nurse ASAP.\"       Pt can be reached at         Pt said he ended up contacting Dr. Canela's office and he is on his way into town as we spoke to go to their office to  a hand written prescription for Norco. Informed pt that our office would usually not prescribe narcotics and this would have to come from surgeon anyways. Pt appreciated return call.    "

## 2018-11-20 ENCOUNTER — TELEPHONE (OUTPATIENT)
Dept: CARDIOLOGY | Facility: MEDICAL CENTER | Age: 63
End: 2018-11-20

## 2018-11-20 NOTE — TELEPHONE ENCOUNTER
Patient has questions about Home Health   Received: Today   Message Contents   FERCHO Cornelius/Carol Ann     Patient wants to find out who ordered Home Health and who's responsible for the bill? He wants a call back at [phone].      Attempted to contact patient, no answer. LVM stating HH was ordered by hospitalist while admitted in hospital, patient is responsible for bill.  If patient would like to refuse HH he certainly has the right to. Advised to call clinic back if patient has further questions.

## 2018-12-01 LAB
ALBUMIN SERPL-MCNC: 4.3 G/DL (ref 3.6–4.8)
ALBUMIN/GLOB SERPL: 2 {RATIO} (ref 1.2–2.2)
ALP SERPL-CCNC: 72 IU/L (ref 39–117)
ALT SERPL-CCNC: 27 IU/L (ref 0–44)
AST SERPL-CCNC: 21 IU/L (ref 0–40)
BILIRUB SERPL-MCNC: 0.7 MG/DL (ref 0–1.2)
BUN SERPL-MCNC: 14 MG/DL (ref 8–27)
BUN/CREAT SERPL: 19 (ref 10–24)
CALCIUM SERPL-MCNC: 9.2 MG/DL (ref 8.6–10.2)
CHLORIDE SERPL-SCNC: 106 MMOL/L (ref 96–106)
CHOLEST SERPL-MCNC: 132 MG/DL (ref 100–199)
CO2 SERPL-SCNC: 20 MMOL/L (ref 20–29)
CREAT SERPL-MCNC: 0.72 MG/DL (ref 0.76–1.27)
ERYTHROCYTE [DISTWIDTH] IN BLOOD BY AUTOMATED COUNT: 13 % (ref 12.3–15.4)
GLOBULIN SER CALC-MCNC: 2.2 G/DL (ref 1.5–4.5)
GLUCOSE SERPL-MCNC: 93 MG/DL (ref 65–99)
HCT VFR BLD AUTO: 40.9 % (ref 37.5–51)
HDLC SERPL-MCNC: 59 MG/DL
HGB BLD-MCNC: 14.2 G/DL (ref 13–17.7)
IF AFRICAN AMERICAN  100797: 113 ML/MIN/1.73
IF NON AFRICAN AMER 100791: 98 ML/MIN/1.73
LABORATORY COMMENT REPORT: NORMAL
LDLC SERPL CALC-MCNC: 58 MG/DL (ref 0–99)
MAGNESIUM SERPL-MCNC: 1.8 MG/DL (ref 1.6–2.3)
MCH RBC QN AUTO: 33.3 PG (ref 26.6–33)
MCHC RBC AUTO-ENTMCNC: 34.7 G/DL (ref 31.5–35.7)
MCV RBC AUTO: 96 FL (ref 79–97)
NRBC BLD AUTO-RTO: ABNORMAL %
POTASSIUM SERPL-SCNC: 4.4 MMOL/L (ref 3.5–5.2)
PROT SERPL-MCNC: 6.5 G/DL (ref 6–8.5)
RBC # BLD AUTO: 4.27 X10E6/UL (ref 4.14–5.8)
SODIUM SERPL-SCNC: 141 MMOL/L (ref 134–144)
TRIGL SERPL-MCNC: 74 MG/DL (ref 0–149)
VLDLC SERPL CALC-MCNC: 15 MG/DL (ref 5–40)
WBC # BLD AUTO: 6.5 X10E3/UL (ref 3.4–10.8)

## 2018-12-06 ENCOUNTER — TELEPHONE (OUTPATIENT)
Dept: CARDIOLOGY | Facility: MEDICAL CENTER | Age: 63
End: 2018-12-06

## 2018-12-06 NOTE — TELEPHONE ENCOUNTER
FW: question about heart rehab   Received: Today     Previous Messages      ----- Message -----   From: Gurwinder Knight, [facility]   Sent: 12/6/2018  11:30 AM   To: Carol Ann Grace R.N.   Subject: question about heart rehab                       Hi JURGEN Maharaj pt would like a call back to discuss alternatives to cardiac rehab. He would like a call back to his home # at the earliest convenience.     Thanks!   Gurwinder [phone]           Pt said he has talked to [facility] cardiac rehab and [facility] and due to necessary co-pays and money restraints he has decided he cannot proceed with rehab. He wanted to know if there is anything he can do at home as far as exercise to mimic cardiac rehab.    Encouraged pt to try to work out some way to even do part of the cardiac rehab program explaining the importance but pt declines.  Explained to pt that the bigest thing is that cardiac rehab monitors him very closely using medical equipment including EKG. Discussed using a pulse oximeter when exercising and taking his blood pressure before and after. Also discussed the target HR limitations due to beta-blocker therapy. Pt encouraged to go slow and not to overdue it. Also discussed diet and education portion.

## 2019-01-04 ENCOUNTER — OFFICE VISIT (OUTPATIENT)
Dept: CARDIOLOGY | Facility: MEDICAL CENTER | Age: 64
End: 2019-01-04
Payer: COMMERCIAL

## 2019-01-04 VITALS
DIASTOLIC BLOOD PRESSURE: 80 MMHG | BODY MASS INDEX: 32.65 KG/M2 | OXYGEN SATURATION: 94 % | SYSTOLIC BLOOD PRESSURE: 130 MMHG | HEART RATE: 82 BPM | HEIGHT: 69 IN | WEIGHT: 220.46 LBS

## 2019-01-04 DIAGNOSIS — I25.10 CORONARY ARTERY DISEASE INVOLVING NATIVE CORONARY ARTERY OF NATIVE HEART WITHOUT ANGINA PECTORIS: ICD-10-CM

## 2019-01-04 DIAGNOSIS — E78.5 DYSLIPIDEMIA: ICD-10-CM

## 2019-01-04 DIAGNOSIS — I10 ESSENTIAL HYPERTENSION, BENIGN: ICD-10-CM

## 2019-01-04 DIAGNOSIS — Z95.1 S/P CABG X 3: ICD-10-CM

## 2019-01-04 PROBLEM — J96.01 ACUTE RESPIRATORY FAILURE WITH HYPOXIA (HCC): Status: RESOLVED | Noted: 2018-10-25 | Resolved: 2019-01-04

## 2019-01-04 PROCEDURE — 99214 OFFICE O/P EST MOD 30 MIN: CPT | Performed by: NURSE PRACTITIONER

## 2019-01-04 RX ORDER — METOPROLOL TARTRATE 50 MG/1
50 TABLET, FILM COATED ORAL 2 TIMES DAILY
Qty: 180 TAB | Refills: 3 | Status: SHIPPED | OUTPATIENT
Start: 2019-01-04 | End: 2019-12-27 | Stop reason: SDUPTHER

## 2019-01-04 RX ORDER — AMLODIPINE BESYLATE 2.5 MG/1
5 TABLET ORAL
Refills: 3 | COMMUNITY
Start: 2018-11-02 | End: 2019-12-27 | Stop reason: SDUPTHER

## 2019-01-04 RX ORDER — OXYCODONE HYDROCHLORIDE 5 MG/1
TABLET ORAL
Refills: 0 | COMMUNITY
Start: 2018-11-12 | End: 2019-12-27

## 2019-01-04 ASSESSMENT — ENCOUNTER SYMPTOMS
HEADACHES: 0
LOSS OF CONSCIOUSNESS: 0
ORTHOPNEA: 0
BACK PAIN: 1
DEPRESSION: 0
BLOOD IN STOOL: 0
PALPITATIONS: 0
PND: 0
CHILLS: 0
FEVER: 0
WEIGHT LOSS: 0
SHORTNESS OF BREATH: 0
NAUSEA: 0
COUGH: 0

## 2019-01-04 NOTE — PATIENT INSTRUCTIONS
Please take Metoprolol 50mg BID, continue to monitor your blood pressure, if it is consistently over 130/90, please call

## 2019-01-04 NOTE — PROGRESS NOTES
Chief Complaint   Patient presents with   • Coronary Artery Disease       Subjective:   Santi Landers is a 63 y.o. male who presents today for follow-up after his CABG, October 24, 2018.    Overall Mr. Landers has been doing well.  He has been taking his antiplatelet medications as directed.  There was a mixup in the last visit and he is taking his metoprolol 37.5 mg twice daily still.  He was unable to attend cardiac rehab due to cost, however he has started his own moderate exercise program.  Walks with his dog and will monitor his heart rate, he is noted his heart rate up to 147.  He denies any chest pressure with this exertion.  Denies any lower extremity edema, orthopnea, PND.    He does not check his blood pressure at home regularly.  He does monitor his weight daily he knows he is up several pounds after the holidays, his normal weight is 212 at home, he is close to 200 pounds by September.    He is requesting a letter today to return to work.  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    Chest burning while dancing   • Arthritis    • CAD (coronary artery disease)     S/P CABG   • High cholesterol    • Hypertension    • Pain     Low back pain     Past Surgical History:   Procedure Laterality Date   • MULTIPLE CORONARY ARTERY BYPASS ENDO VEIN HARVEST  10/24/2018    Procedure: MULTIPLE CORONARY ARTERY BYPASS x 3 ENDO VEIN HARVEST- RIGHT LEG, BILATERAL ANTERIOR MAMMARY ARTERY HARVEST;  Surgeon: Berhane Canela M.D.;  Location: SURGERY [facility];  Service: Cardiac   • LORENA  10/24/2018    Procedure: LORENA;  Surgeon: Berhane Canela M.D.;  Location: SURGERY [facility];  Service: Cardiac   • CHOLECYSTECTOMY  1989   • CARDIAC CATH       No family history on file.  Social History     Social History   • Marital status:      Spouse name: N/A   • Number of children: N/A   • Years of education: N/A     Occupational History   • Not on file.     Social History  Main Topics   • Smoking status: Never Smoker   • Smokeless tobacco: Never Used   • Alcohol use Yes      Comment: 2-3 drinks about 5 days a week   • Drug use: No   • Sexual activity: Not on file     Other Topics Concern   • Not on file     Social History Narrative   • No narrative on file     No Known Allergies  Outpatient Encounter Prescriptions as of 1/4/2019   Medication Sig Dispense Refill   • metoprolol (LOPRESSOR) 50 MG Tab Take 1 Tab by mouth 2 times a day. 180 Tab 3   • HYDROcodone-acetaminophen (NORCO) 5-325 MG Tab per tablet Take 1 Tablet by mouth every 4 hours as needed for Pain, Moderate.     • aspirin EC 81 MG EC tablet Take 1 Tab by mouth every day. 30 Tab 3   • clopidogrel (PLAVIX) 75 MG Tab Take 1 Tab by mouth every day. 30 Tab 3   • therapeutic multivitamin-minerals (THERAGRAN-M) Tab Take 1 Tab by mouth every day.     • atorvastatin (LIPITOR) 80 MG tablet Take 1 Tab by mouth every day. 90 Tab 3   • gabapentin (NEURONTIN) 300 MG Cap TAKE 900MG THREE A DAY     • lidocaine (LIDODERM) 5 % Patch PLACE 1-3 PATCHES ON THE SKIN ANYWHERE YOU HAVE PAIN. WEAR 12 HOURS ON AND 12 HOURS OFF  0   • cyclobenzaprine (FLEXERIL) 10 MG Tab TAKE 1 TABLET BY MOUTH EVERYDAY AT BEDTIME  0   • amLODIPine (NORVASC) 2.5 MG Tab Take 2.5 mg by mouth every day.  3   • oxyCODONE immediate-release (ROXICODONE) 5 MG Tab TAKE 1 TABLET BY MOUTH EVERY 6 HOURS AS NEEDED FOR PAIN FOR 2 WEEKS  0   • [DISCONTINUED] metoprolol 37.5 MG Tab Take 37.5 mg by mouth 2 Times a Day. 90 Tab 3   • [DISCONTINUED] furosemide (LASIX) 40 MG Tab Take 1 Tab by mouth every day. 30 Tab 3   • [DISCONTINUED] potassium chloride SA (KDUR) 20 MEQ Tab CR Take 0.5 Tabs by mouth every day. 30 Tab 3   • [DISCONTINUED] amLODIPine (NORVASC) 5 MG Tab Take 5 mg by mouth every day.       No facility-administered encounter medications on file as of 1/4/2019.      Review of Systems   Constitutional: Negative for chills, fever and weight loss.   HENT: Negative for  "nosebleeds.    Respiratory: Negative for cough and shortness of breath.    Cardiovascular: Negative for chest pain, palpitations, orthopnea, leg swelling and PND.   Gastrointestinal: Negative for blood in stool, melena and nausea.   Genitourinary: Negative for hematuria.   Musculoskeletal: Positive for back pain.   Neurological: Negative for loss of consciousness and headaches.   Psychiatric/Behavioral: Negative for depression.        Objective:   /80 (BP Location: Left arm, Patient Position: Sitting, BP Cuff Size: Adult)   Pulse 82   Ht 1.753 m (5' 9\")   Wt 100 kg (220 lb 7.4 oz)   SpO2 94%   BMI 32.56 kg/m²     Physical Exam   Constitutional: He is oriented to person, place, and time. He appears well-developed and well-nourished. No distress.   HENT:   Head: Normocephalic and atraumatic.   Eyes: Conjunctivae are normal. No scleral icterus.   Neck: Neck supple. No JVD present.   Cardiovascular: Normal rate, regular rhythm and intact distal pulses.    No murmur heard.  Well-healed sternal scar without redness or tenderness. Protuberance of sternal notch.   Pulmonary/Chest: Effort normal and breath sounds normal. No respiratory distress. He has no wheezes.   Abdominal: Soft. Bowel sounds are normal. He exhibits no distension. There is no tenderness.   Musculoskeletal: He exhibits no edema.   Neurological: He is alert and oriented to person, place, and time. No cranial nerve deficit.   Skin: Skin is warm and dry. He is not diaphoretic. No pallor.   Normal cap refill   Psychiatric: Judgment normal.       Assessment:     1. Essential hypertension, benign     2. Dyslipidemia     3. Coronary artery disease involving native coronary artery of native heart without angina pectoris     4. S/P CABG x 3         Medical Decision Making:  Today's Assessment / Status / Plan:   1.  Coronary artery disease status post CABG, 10/18  -EF preserved via LORENA post CABG  -Continue DAPT for 3 months as directed by CV surgery, " after continue ASA 81mg only  -Increase metoprolol tartrate to 50 mg twice daily, prescription sent  -Lipitor 80 mg daily    2.  Hypertension  -Mildly hypertensive in the office, recheck was 146/82  -Increase metoprolol tartrate to 50 mg, as above  -Continue amlodipine 2.5 mg  -Will check blood pressures at home and call office if consistently elevated, systolics greater than 130    3.  Dyslipidemia  -Lipids checked in November, excellent control, LDL 58  -Continue atorvastatin 80 mg    Follow-up in 6 months with Chriss, sooner if problems/BP not controlled    Collaborating MD: Dr. Browning

## 2019-01-31 ENCOUNTER — TELEPHONE (OUTPATIENT)
Dept: CARDIOLOGY | Facility: MEDICAL CENTER | Age: 64
End: 2019-01-31

## 2019-01-31 NOTE — TELEPHONE ENCOUNTER
Patient called, transferred by .  Patient calling to confirm how long he needs to be on Plavix.  Initial instructions and Rx were provided by Dr. Canela.  Advised to contact Dr. Canela's office to confirm.  Patient verbalized understanding.

## 2019-04-29 NOTE — PROGRESS NOTES
NSR/ST 0.2/0.08/0.36    12hr CC   Please call- wondering if son needs to be seen for follow up with Dr. Crisostomo since her son is going for speech therapy?    Mom also requesting medical records- am directing her to [employer] website for form.

## 2019-08-06 ENCOUNTER — APPOINTMENT (RX ONLY)
Dept: URBAN - METROPOLITAN AREA CLINIC 4 | Facility: CLINIC | Age: 64
Setting detail: DERMATOLOGY
End: 2019-08-06

## 2019-08-06 DIAGNOSIS — L82.1 OTHER SEBORRHEIC KERATOSIS: ICD-10-CM

## 2019-08-06 DIAGNOSIS — D18.0 HEMANGIOMA: ICD-10-CM

## 2019-08-06 DIAGNOSIS — L81.4 OTHER MELANIN HYPERPIGMENTATION: ICD-10-CM

## 2019-08-06 DIAGNOSIS — Z85.820 PERSONAL HISTORY OF MALIGNANT MELANOMA OF SKIN: ICD-10-CM

## 2019-08-06 DIAGNOSIS — D22 MELANOCYTIC NEVI: ICD-10-CM

## 2019-08-06 PROBLEM — D22.62 MELANOCYTIC NEVI OF LEFT UPPER LIMB, INCLUDING SHOULDER: Status: ACTIVE | Noted: 2019-08-06

## 2019-08-06 PROBLEM — D22.61 MELANOCYTIC NEVI OF RIGHT UPPER LIMB, INCLUDING SHOULDER: Status: ACTIVE | Noted: 2019-08-06

## 2019-08-06 PROBLEM — E78.5 HYPERLIPIDEMIA, UNSPECIFIED: Status: ACTIVE | Noted: 2019-08-06

## 2019-08-06 PROBLEM — D18.01 HEMANGIOMA OF SKIN AND SUBCUTANEOUS TISSUE: Status: ACTIVE | Noted: 2019-08-06

## 2019-08-06 PROBLEM — D22.71 MELANOCYTIC NEVI OF RIGHT LOWER LIMB, INCLUDING HIP: Status: ACTIVE | Noted: 2019-08-06

## 2019-08-06 PROBLEM — D22.5 MELANOCYTIC NEVI OF TRUNK: Status: ACTIVE | Noted: 2019-08-06

## 2019-08-06 PROBLEM — D22.72 MELANOCYTIC NEVI OF LEFT LOWER LIMB, INCLUDING HIP: Status: ACTIVE | Noted: 2019-08-06

## 2019-08-06 PROCEDURE — 99213 OFFICE O/P EST LOW 20 MIN: CPT

## 2019-08-06 PROCEDURE — ? OBSERVATION

## 2019-08-06 PROCEDURE — ? COUNSELING

## 2019-08-06 ASSESSMENT — LOCATION DETAILED DESCRIPTION DERM
LOCATION DETAILED: RIGHT INFERIOR MEDIAL UPPER BACK
LOCATION DETAILED: LEFT DISTAL POSTERIOR THIGH
LOCATION DETAILED: LEFT ANTERIOR DISTAL UPPER ARM
LOCATION DETAILED: RIGHT SUPERIOR MEDIAL MIDBACK
LOCATION DETAILED: RIGHT DISTAL DORSAL FOREARM
LOCATION DETAILED: RIGHT PROXIMAL POSTERIOR THIGH
LOCATION DETAILED: LEFT MEDIAL UPPER BACK
LOCATION DETAILED: LEFT DISTAL ULNAR DORSAL FOREARM

## 2019-08-06 ASSESSMENT — LOCATION SIMPLE DESCRIPTION DERM
LOCATION SIMPLE: RIGHT UPPER BACK
LOCATION SIMPLE: LEFT FOREARM
LOCATION SIMPLE: RIGHT POSTERIOR THIGH
LOCATION SIMPLE: RIGHT FOREARM
LOCATION SIMPLE: LEFT UPPER ARM
LOCATION SIMPLE: RIGHT LOWER BACK
LOCATION SIMPLE: LEFT POSTERIOR THIGH
LOCATION SIMPLE: LEFT UPPER BACK

## 2019-08-06 ASSESSMENT — LOCATION ZONE DERM
LOCATION ZONE: TRUNK
LOCATION ZONE: LEG
LOCATION ZONE: ARM

## 2019-08-06 NOTE — PROCEDURE: COUNSELING
When Should The Patient Follow-Up For Their Next Full-Body Skin Exam?: 1 Year
Detail Level: Zone
Quality 137: Melanoma: Continuity Of Care - Recall System: Patient information entered into a recall system that includes: target date for the next exam specified AND a process to follow up with patients regarding missed or unscheduled appointments

## 2019-12-12 DIAGNOSIS — E78.5 DYSLIPIDEMIA: ICD-10-CM

## 2019-12-12 DIAGNOSIS — I10 ESSENTIAL HYPERTENSION, BENIGN: ICD-10-CM

## 2019-12-12 RX ORDER — ATORVASTATIN CALCIUM 80 MG/1
80 TABLET, FILM COATED ORAL DAILY
Qty: 90 TAB | Refills: 3 | Status: SHIPPED | OUTPATIENT
Start: 2019-12-12 | End: 2021-11-16

## 2019-12-27 ENCOUNTER — OFFICE VISIT (OUTPATIENT)
Dept: CARDIOLOGY | Facility: MEDICAL CENTER | Age: 64
End: 2019-12-27
Payer: MEDICARE

## 2019-12-27 ENCOUNTER — TELEPHONE (OUTPATIENT)
Dept: CARDIOLOGY | Facility: MEDICAL CENTER | Age: 64
End: 2019-12-27

## 2019-12-27 ENCOUNTER — HOSPITAL ENCOUNTER (OUTPATIENT)
Dept: CARDIOLOGY | Facility: MEDICAL CENTER | Age: 64
End: 2019-12-27
Attending: NURSE PRACTITIONER
Payer: MEDICARE

## 2019-12-27 VITALS
OXYGEN SATURATION: 96 % | DIASTOLIC BLOOD PRESSURE: 88 MMHG | HEART RATE: 70 BPM | BODY MASS INDEX: 33.24 KG/M2 | HEIGHT: 69 IN | SYSTOLIC BLOOD PRESSURE: 140 MMHG | WEIGHT: 224.43 LBS

## 2019-12-27 DIAGNOSIS — E78.5 DYSLIPIDEMIA: ICD-10-CM

## 2019-12-27 DIAGNOSIS — I10 ESSENTIAL HYPERTENSION, BENIGN: ICD-10-CM

## 2019-12-27 DIAGNOSIS — Z95.1 S/P CABG X 3: ICD-10-CM

## 2019-12-27 DIAGNOSIS — I25.10 CORONARY ARTERY DISEASE INVOLVING NATIVE CORONARY ARTERY OF NATIVE HEART WITHOUT ANGINA PECTORIS: ICD-10-CM

## 2019-12-27 LAB
EKG IMPRESSION: NORMAL
LV EJECT FRACT  99904: 55
LV EJECT FRACT MOD 2C 99903: 56.7
LV EJECT FRACT MOD 4C 99902: 56.48
LV EJECT FRACT MOD BP 99901: 56.62

## 2019-12-27 PROCEDURE — 99214 OFFICE O/P EST MOD 30 MIN: CPT | Performed by: NURSE PRACTITIONER

## 2019-12-27 PROCEDURE — 93306 TTE W/DOPPLER COMPLETE: CPT

## 2019-12-27 PROCEDURE — 93000 ELECTROCARDIOGRAM COMPLETE: CPT | Performed by: INTERNAL MEDICINE

## 2019-12-27 PROCEDURE — 700117 HCHG RX CONTRAST REV CODE 255: Performed by: FAMILY MEDICINE

## 2019-12-27 PROCEDURE — 93306 TTE W/DOPPLER COMPLETE: CPT | Mod: 26 | Performed by: INTERNAL MEDICINE

## 2019-12-27 RX ORDER — AMLODIPINE BESYLATE 5 MG/1
5 TABLET ORAL EVERY EVENING
Qty: 90 TAB | Refills: 3 | COMMUNITY
Start: 2019-12-27

## 2019-12-27 RX ORDER — METOPROLOL TARTRATE 50 MG/1
50 TABLET, FILM COATED ORAL 2 TIMES DAILY
Qty: 180 TAB | Refills: 3 | Status: SHIPPED | OUTPATIENT
Start: 2019-12-27

## 2019-12-27 RX ADMIN — HUMAN ALBUMIN MICROSPHERES AND PERFLUTREN 3 ML: 10; .22 INJECTION, SOLUTION INTRAVENOUS at 18:17

## 2019-12-27 ASSESSMENT — ENCOUNTER SYMPTOMS
SHORTNESS OF BREATH: 0
ABDOMINAL PAIN: 0
PND: 0
COUGH: 0
DIZZINESS: 0
FEVER: 0
BACK PAIN: 1
PALPITATIONS: 0
ORTHOPNEA: 0
CLAUDICATION: 0
MYALGIAS: 0

## 2019-12-27 NOTE — PROGRESS NOTES
Chief Complaint   Patient presents with   • Hypertension   • Coronary Artery Disease     Subjective:   Santi Landers is a 63 y.o. male who presents today for 6 month follow up for his coronary disease and one year S/P CABG X (AI-LAD, LIMA-OM, RSVG-diag).      He is a patient of Dr. Palacios in our office.    Hx of CAD with recent CABG X3, HLD, HTN, and chronic back pain.    He lives in [place], [place].     He is feeling overall great. 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    His BP has been trending on the higher side, his PCP increased his amlodipine to 5 mg.     He states his home BP's have been much more controlled now.    He has no other cardiac complaints.    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    Chest burning while dancing   • Arthritis    • CAD (coronary artery disease)     S/P CABG   • High cholesterol    • Hypertension    • Pain     Low back pain     Past Surgical History:   Procedure Laterality Date   • MULTIPLE CORONARY ARTERY BYPASS ENDO VEIN HARVEST  10/24/2018    Procedure: MULTIPLE CORONARY ARTERY BYPASS x 3 ENDO VEIN HARVEST- RIGHT LEG, BILATERAL ANTERIOR MAMMARY ARTERY HARVEST;  Surgeon: Berhane Canela M.D.;  Location: SURGERY [facility];  Service: Cardiac   • LORENA  10/24/2018    Procedure: LORENA;  Surgeon: Berhane Canela M.D.;  Location: SURGERY [facility];  Service: Cardiac   • CHOLECYSTECTOMY  1989   • [facility] CARDIAC CATH       History reviewed. No pertinent family history.  Social History     Socioeconomic History   • Marital status:      Spouse name: Not on file   • Number of children: Not on file   • Years of education: Not on file   • Highest education level: Not on file   Occupational History   • Not on file   Social Needs   • Financial resource strain: Not on file   • Food insecurity:     Worry: Not on file     Inability: Not on file   • Transportation  needs:     Medical: Not on file     Non-medical: Not on file   Tobacco Use   • Smoking status: Never Smoker   • Smokeless tobacco: Never Used   Substance and Sexual Activity   • Alcohol use: Yes     Comment: 2-3 drinks about 5 days a week   • Drug use: No   • Sexual activity: Not on file   Lifestyle   • Physical activity:     Days per week: Not on file     Minutes per session: Not on file   • Stress: Not on file   Relationships   • Social connections:     Talks on phone: Not on file     Gets together: Not on file     Attends Rastafari service: Not on file     Active member of club or organization: Not on file     Attends meetings of clubs or organizations: Not on file     Relationship status: Not on file   • Intimate partner violence:     Fear of current or ex partner: Not on file     Emotionally abused: Not on file     Physically abused: Not on file     Forced sexual activity: Not on file   Other Topics Concern   • Not on file   Social History Narrative   • Not on file     No Known Allergies  Outpatient Encounter Medications as of 12/27/2019   Medication Sig Dispense Refill   • metoprolol (LOPRESSOR) 50 MG Tab Take 1 Tab by mouth 2 times a day. 180 Tab 3   • amLODIPine (NORVASC) 5 MG Tab Take 1 Tab by mouth every day. 90 Tab 3   • atorvastatin (LIPITOR) 80 MG tablet Take 1 Tab by mouth every day. 90 Tab 3   • aspirin EC 81 MG EC tablet Take 1 Tab by mouth every day. 30 Tab 3   • therapeutic multivitamin-minerals (THERAGRAN-M) Tab Take 1 Tab by mouth every day.     • gabapentin (NEURONTIN) 300 MG Cap TAKE 900MG THREE A DAY     • cyclobenzaprine (FLEXERIL) 10 MG Tab TAKE 1 TABLET BY MOUTH EVERYDAY AT BEDTIME  0   • [DISCONTINUED] amLODIPine (NORVASC) 2.5 MG Tab Take 5 mg by mouth every day.  3   • [DISCONTINUED] oxyCODONE immediate-release (ROXICODONE) 5 MG Tab TAKE 1 TABLET BY MOUTH EVERY 6 HOURS AS NEEDED FOR PAIN FOR 2 WEEKS  0   • [DISCONTINUED] metoprolol (LOPRESSOR) 50 MG Tab Take 1 Tab by mouth 2 times a day.  "180 Tab 3   • [DISCONTINUED] HYDROcodone-acetaminophen (NORCO) 5-325 MG Tab per tablet Take 1 Tablet by mouth every 4 hours as needed for Pain, Moderate.     • [DISCONTINUED] clopidogrel (PLAVIX) 75 MG Tab Take 1 Tab by mouth every day. (Patient not taking: Reported on 12/27/2019) 30 Tab 3   • lidocaine (LIDODERM) 5 % Patch PLACE 1-3 PATCHES ON THE SKIN ANYWHERE YOU HAVE PAIN. WEAR 12 HOURS ON AND 12 HOURS OFF  0     No facility-administered encounter medications on file as of 12/27/2019.      Review of Systems   Constitutional: Negative for fever and malaise/fatigue.   Respiratory: Negative for cough and shortness of breath.    Cardiovascular: Positive for leg swelling. Negative for chest pain, palpitations, orthopnea, claudication and PND.        Worse on R ankle than L   Gastrointestinal: Negative for abdominal pain.   Musculoskeletal: Positive for back pain. Negative for myalgias.   Neurological: Negative for dizziness.        Objective:   /88 (BP Location: Left arm, Patient Position: Sitting, BP Cuff Size: Adult)   Pulse 70   Ht 1.753 m (5' 9\")   Wt 101.8 kg (224 lb 6.9 oz)   SpO2 96%   BMI 33.14 kg/m²     Physical Exam   Constitutional: He appears well-developed and well-nourished.   HENT:   Head: Normocephalic and atraumatic.   Eyes: EOM are normal.   Neck: No JVD present.   Cardiovascular: Normal rate, regular rhythm, normal heart sounds and intact distal pulses.   Pulmonary/Chest: Effort normal and breath sounds normal.   Musculoskeletal: Normal range of motion.         General: Edema present.      Comments: Mild edema in R ankle   Skin: Skin is warm and dry.   Psychiatric: He has a normal mood and affect.   Nursing note and vitals reviewed.      Assessment:     1. Essential hypertension, benign  EKG    EC-ECHOCARDIOGRAM COMPLETE W/ CONT   2. Coronary artery disease involving native coronary artery of native heart without angina pectoris  EKG    EC-ECHOCARDIOGRAM COMPLETE W/ CONT   3. S/P CABG x " 3  EKG    EC-ECHOCARDIOGRAM COMPLETE W/ CONT   4. Dyslipidemia  EC-ECHOCARDIOGRAM COMPLETE W/ CONT    LIPID PANEL     Medical Decision Making:  Today's Assessment / Status / Plan:     1. S/P CABG X3  -no angina or JANG  -cont asa lifelong, statin, bb    2. HLD  -statin  -LDL goal <70 with CAD  -check cmp and lipid, no recent lipid panel this year  -obtain prior to next apt    3. HTN  -higher in office, good control at home, call for SBP >140  -cont metoprolol 50 mg BID, amlodipine 5 mg QD    FU in clinic in 6 months with SW with labs, echo for EF measurement for work document, letter made today    Patient verbalizes understanding and agrees with the plan of care.     Collaborating MD: Nallely MCALLISTER

## 2019-12-27 NOTE — LETTER
December 27, 2019        Vladimir Landers    To Whom It May Concern:    Vladimir Landers is under the care of [facility] cardiology for his coronary artery disease post CABG in '18. He has recovered well with normal vital signs, tolerating his medications, and with a normal EF post-operatively. His blood pressure is well controlled. He is able to tolerate exercise with no symptoms. Overall, we are pleased with his recovery and post-operative course. He will continue under our care for his cardiology needs. 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     Please call our office for any further questions or concerns.      Sincerely,        POOJA Blanco.

## 2019-12-27 NOTE — TELEPHONE ENCOUNTER
Called pt. To notify [facility] has no results on file for cholesterol, SC ordered lipid panel, orders have been mailed to address on file. Pt. Also advised to stay hydrated and watch sugars & carbs in diet for elevated blood sugar - per SC.

## 2020-09-02 ENCOUNTER — TELEPHONE (OUTPATIENT)
Dept: CARDIOLOGY | Facility: MEDICAL CENTER | Age: 65
End: 2020-09-02

## 2020-09-10 ENCOUNTER — HOSPITAL ENCOUNTER (OUTPATIENT)
Dept: LAB | Facility: MEDICAL CENTER | Age: 65
End: 2020-09-10
Attending: NURSE PRACTITIONER
Payer: MEDICARE

## 2020-09-10 LAB
CHOLEST SERPL-MCNC: 145 MG/DL (ref 100–199)
FASTING STATUS PATIENT QL REPORTED: NORMAL
HDLC SERPL-MCNC: 63 MG/DL
LDLC SERPL CALC-MCNC: 52 MG/DL
TRIGL SERPL-MCNC: 151 MG/DL (ref 0–149)

## 2020-09-10 PROCEDURE — 80061 LIPID PANEL: CPT

## 2020-09-10 PROCEDURE — 36415 COLL VENOUS BLD VENIPUNCTURE: CPT

## 2020-09-14 ENCOUNTER — OFFICE VISIT (OUTPATIENT)
Dept: CARDIOLOGY | Facility: MEDICAL CENTER | Age: 65
End: 2020-09-14
Payer: MEDICARE

## 2020-09-14 VITALS
WEIGHT: 220 LBS | SYSTOLIC BLOOD PRESSURE: 156 MMHG | HEIGHT: 69 IN | DIASTOLIC BLOOD PRESSURE: 82 MMHG | BODY MASS INDEX: 32.58 KG/M2 | OXYGEN SATURATION: 97 % | HEART RATE: 68 BPM

## 2020-09-14 DIAGNOSIS — I25.10 CORONARY ARTERY DISEASE INVOLVING NATIVE CORONARY ARTERY OF NATIVE HEART WITHOUT ANGINA PECTORIS: ICD-10-CM

## 2020-09-14 DIAGNOSIS — I10 ESSENTIAL HYPERTENSION, BENIGN: ICD-10-CM

## 2020-09-14 DIAGNOSIS — Z95.1 S/P CABG (CORONARY ARTERY BYPASS GRAFT): ICD-10-CM

## 2020-09-14 DIAGNOSIS — E78.5 DYSLIPIDEMIA: ICD-10-CM

## 2020-09-14 PROCEDURE — 99214 OFFICE O/P EST MOD 30 MIN: CPT | Performed by: INTERNAL MEDICINE

## 2020-09-14 ASSESSMENT — ENCOUNTER SYMPTOMS
MYALGIAS: 0
COUGH: 0
PALPITATIONS: 0
SHORTNESS OF BREATH: 0
DIZZINESS: 0
LOSS OF CONSCIOUSNESS: 0
BACK PAIN: 1

## 2020-09-14 ASSESSMENT — FIBROSIS 4 INDEX: FIB4 SCORE: 1.43

## 2020-09-14 NOTE — PROGRESS NOTES
Chief Complaint   Patient presents with   • Coronary Artery Disease     follow up       Subjective:   Santi Landers is a 64 y.o. male who presents today for follow-up evaluation of CAD, CABG, hypertension and dyslipidemia.    Last seen on 12/27/2019 by FUENTES Rodriguez.    Since 12/27/2019 the patient has had no cardiac problems or symptoms.  He is now a  in [place], [place].  He is followed by Dr. Meli Ramos.  At last visit he states his amlodipine was increased from 2-1/2 to 5 mg.    He denies angina pectoris or shortness of breath but activity is limited due to his low back and hip pain for which he is followed in a pain clinic.  Has struggled to lose weight but in his wife are cognizant about trying to eat right.  Tried swimming but that still bothers his back.  Occasionally monitors blood pressure and is labile high.    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    Chest burning while dancing   • Arthritis    • CAD (coronary artery disease)     S/P CABG   • High cholesterol    • Hypertension    • Pain     Low back pain     Past Surgical History:   Procedure Laterality Date   • MULTIPLE CORONARY ARTERY BYPASS ENDO VEIN HARVEST  10/24/2018    Procedure: MULTIPLE CORONARY ARTERY BYPASS x 3 ENDO VEIN HARVEST- RIGHT LEG, BILATERAL ANTERIOR MAMMARY ARTERY HARVEST;  Surgeon: Berhane Canela M.D.;  Location: SURGERY [facility];  Service: Cardiac   • LORENA  10/24/2018    Procedure: LORENA;  Surgeon: Berhane Canela M.D.;  Location: SURGERY [facility];  Service: Cardiac   • CHOLECYSTECTOMY  1989   • ZZZ CARDIAC CATH       No family history on file.  Social History     Socioeconomic History   • Marital status:      Spouse name: Not on file   • Number of children: Not on file   • Years of education: Not on file   • Highest education level: Not on file   Occupational History   • Not on file   Social Needs   • Financial resource strain: Not on  file   • Food insecurity     Worry: Not on file     Inability: Not on file   • Transportation needs     Medical: Not on file     Non-medical: Not on file   Tobacco Use   • Smoking status: Never Smoker   • Smokeless tobacco: Never Used   Substance and Sexual Activity   • Alcohol use: Yes     Comment: 2-3 drinks about 5 days a week   • Drug use: No   • Sexual activity: Not on file   Lifestyle   • Physical activity     Days per week: Not on file     Minutes per session: Not on file   • Stress: Not on file   Relationships   • Social connections     Talks on phone: Not on file     Gets together: Not on file     Attends Hindu service: Not on file     Active member of club or organization: Not on file     Attends meetings of clubs or organizations: Not on file     Relationship status: Not on file   • Intimate partner violence     Fear of current or ex partner: Not on file     Emotionally abused: Not on file     Physically abused: Not on file     Forced sexual activity: Not on file   Other Topics Concern   • Not on file   Social History Narrative   • Not on file     No Known Allergies  Outpatient Encounter Medications as of 9/14/2020   Medication Sig Dispense Refill   • metoprolol (LOPRESSOR) 50 MG Tab Take 1 Tab by mouth 2 times a day. 180 Tab 3   • amLODIPine (NORVASC) 5 MG Tab Take 1 Tab by mouth every day. 90 Tab 3   • atorvastatin (LIPITOR) 80 MG tablet Take 1 Tab by mouth every day. 90 Tab 3   • aspirin EC 81 MG EC tablet Take 1 Tab by mouth every day. 30 Tab 3   • therapeutic multivitamin-minerals (THERAGRAN-M) Tab Take 1 Tab by mouth every day.     • gabapentin (NEURONTIN) 300 MG Cap TAKE 900MG THREE A DAY     • cyclobenzaprine (FLEXERIL) 10 MG Tab TAKE 1 TABLET BY MOUTH EVERYDAY AT BEDTIME  0   • lidocaine (LIDODERM) 5 % Patch PLACE 1-3 PATCHES ON THE SKIN ANYWHERE YOU HAVE PAIN. WEAR 12 HOURS ON AND 12 HOURS OFF  0     No facility-administered encounter medications on file as of 9/14/2020.      Review of  "Systems   Respiratory: Negative for cough and shortness of breath.    Cardiovascular: Negative for chest pain and palpitations.   Musculoskeletal: Positive for back pain and joint pain. Negative for myalgias.   Neurological: Negative for dizziness and loss of consciousness.        Objective:   /82 (BP Location: Left arm, Patient Position: Sitting)   Pulse 68   Ht 1.753 m (5' 9\")   Wt 99.8 kg (220 lb)   SpO2 97%   BMI 32.49 kg/m²     Physical Exam   Constitutional: He is oriented to person, place, and time. He appears well-developed and well-nourished. No distress.   Eyes: Pupils are equal, round, and reactive to light. Conjunctivae and EOM are normal.   Neck: Normal range of motion. Neck supple. No JVD present.   Cardiovascular: Normal rate, regular rhythm, normal heart sounds and intact distal pulses. Exam reveals no gallop and no friction rub.   No murmur heard.  Pulses:       Carotid pulses are 2+ on the right side and 2+ on the left side.  Pulmonary/Chest: Effort normal and breath sounds normal. No accessory muscle usage. No respiratory distress. He has no wheezes. He has no rales.   Abdominal: Soft. He exhibits no distension and no mass. There is no hepatosplenomegaly. There is no abdominal tenderness.   Protuberant.   Musculoskeletal:         General: No edema.   Neurological: He is alert and oriented to person, place, and time.   Skin: Skin is warm, dry and intact. No rash noted. No cyanosis. Nails show no clubbing.   Psychiatric: He has a normal mood and affect. His behavior is normal.   Vitals reviewed.    CARDIAC SURGERY 10/24/2018  PREOPERATIVE DIAGNOSES:  Severe multivessel coronary artery disease and angina.  POSTOPERATIVE DIAGNOSES:  Severe multivessel coronary artery disease and angina.  PROCEDURES PERFORMED:  Coronary artery bypass grafting x3 (right internal mammary artery to mid left anterior descending artery, left internal mammary artery to first obtuse marginal branch, reverse " saphenous vein graft to first diagonal branch), endoscopic vein harvesting, insertion of left femoral arterial line, and transesophageal echocardiography.    ECHOCARDIOGRAM 12/27/2019  Left ventricular ejection fraction 55%.  Normal regional wall motion.  Mild aortic regurgitation.  Mild mitral regurgitation.  Normal right ventricular function.    Assessment:     1. Coronary artery disease involving native coronary artery of native heart without angina pectoris     2. S/P CABG (coronary artery bypass graft)     3. Essential hypertension, benign     4. Dyslipidemia       Medical Decision Making:  Today's Assessment / Status / Plan:     1.  CAD.  2.  CABG, three-vessel, LIMA to LAD, SVG to OM1, SVG to D1.  10/24/2018.  3.  Hypertension.  4.  Dyslipidemia  5.  Obesity.  6.  Lumbar disc disease/chronic back pain.    Recommendation Discussion  1.  The patient is stable from a cardiac standpoint concerning his CAD.  2.  Reviewed recent blood work and his LDL is at goal less than 70.  3.  BP not at goal at least in the clinic recommend that he monitor his blood pressure daily National Guideline Goal should be at least in the 130s/70s or below as tolerated; will follow up with Dr. Ramos for blood pressure management.  4.  RTC 1 year.

## 2020-09-14 NOTE — LETTER
[facility]   [address], [address]  [place] [address]  Phone: [phone]  Fax: [phone]              Santi Landers  1955    Encounter Date: 9/14/2020    Maciej Palacios M.D.          PROGRESS NOTE:  Chief Complaint   Patient presents with   • Coronary Artery Disease     follow up       Subjective:   Santi Landers is a 64 y.o. male who presents today for follow-up evaluation of CAD, CABG, hypertension and dyslipidemia.    Last seen on 12/27/2019 by FUENTES Rodriguez.    Since 12/27/2019 the patient has had no cardiac problems or symptoms.  He is now a  in [place], [place].  He is followed by Dr. Meli Ramos.  At last visit he states his amlodipine was increased from 2-1/2 to 5 mg.    He denies angina pectoris or shortness of breath but activity is limited due to his low back and hip pain for which he is followed in a pain clinic.  Has struggled to lose weight but in his wife are cognizant about trying to eat right.  Tried swimming but that still bothers his back.  Occasionally monitors blood pressure and is labile high.    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    Chest burning while dancing   • Arthritis    • CAD (coronary artery disease)     S/P CABG   • High cholesterol    • Hypertension    • Pain     Low back pain     Past Surgical History:   Procedure Laterality Date   • MULTIPLE CORONARY ARTERY BYPASS ENDO VEIN HARVEST  10/24/2018    Procedure: MULTIPLE CORONARY ARTERY BYPASS x 3 ENDO VEIN HARVEST- RIGHT LEG, BILATERAL ANTERIOR MAMMARY ARTERY HARVEST;  Surgeon: Berhane Canela M.D.;  Location: SURGERY [facility];  Service: Cardiac   • LORENA  10/24/2018    Procedure: LORENA;  Surgeon: Berhane Canela M.D.;  Location: SURGERY [facility];  Service: Cardiac   • CHOLECYSTECTOMY  1989   • ZZZ CARDIAC CATH       No family history on file.  Social History     Socioeconomic  History   • Marital status:      Spouse name: Not on file   • Number of children: Not on file   • Years of education: Not on file   • Highest education level: Not on file   Occupational History   • Not on file   Social Needs   • Financial resource strain: Not on file   • Food insecurity     Worry: Not on file     Inability: Not on file   • Transportation needs     Medical: Not on file     Non-medical: Not on file   Tobacco Use   • Smoking status: Never Smoker   • Smokeless tobacco: Never Used   Substance and Sexual Activity   • Alcohol use: Yes     Comment: 2-3 drinks about 5 days a week   • Drug use: No   • Sexual activity: Not on file   Lifestyle   • Physical activity     Days per week: Not on file     Minutes per session: Not on file   • Stress: Not on file   Relationships   • Social connections     Talks on phone: Not on file     Gets together: Not on file     Attends Druze service: Not on file     Active member of club or organization: Not on file     Attends meetings of clubs or organizations: Not on file     Relationship status: Not on file   • Intimate partner violence     Fear of current or ex partner: Not on file     Emotionally abused: Not on file     Physically abused: Not on file     Forced sexual activity: Not on file   Other Topics Concern   • Not on file   Social History Narrative   • Not on file     No Known Allergies  Outpatient Encounter Medications as of 9/14/2020   Medication Sig Dispense Refill   • metoprolol (LOPRESSOR) 50 MG Tab Take 1 Tab by mouth 2 times a day. 180 Tab 3   • amLODIPine (NORVASC) 5 MG Tab Take 1 Tab by mouth every day. 90 Tab 3   • atorvastatin (LIPITOR) 80 MG tablet Take 1 Tab by mouth every day. 90 Tab 3   • aspirin EC 81 MG EC tablet Take 1 Tab by mouth every day. 30 Tab 3   • therapeutic multivitamin-minerals (THERAGRAN-M) Tab Take 1 Tab by mouth every day.     • gabapentin (NEURONTIN) 300 MG Cap TAKE 900MG THREE A DAY     • cyclobenzaprine (FLEXERIL) 10 MG  "Tab TAKE 1 TABLET BY MOUTH EVERYDAY AT BEDTIME  0   • lidocaine (LIDODERM) 5 % Patch PLACE 1-3 PATCHES ON THE SKIN ANYWHERE YOU HAVE PAIN. WEAR 12 HOURS ON AND 12 HOURS OFF  0     No facility-administered encounter medications on file as of 9/14/2020.      Review of Systems   Respiratory: Negative for cough and shortness of breath.    Cardiovascular: Negative for chest pain and palpitations.   Musculoskeletal: Positive for back pain and joint pain. Negative for myalgias.   Neurological: Negative for dizziness and loss of consciousness.        Objective:   /82 (BP Location: Left arm, Patient Position: Sitting)   Pulse 68   Ht 1.753 m (5' 9\")   Wt 99.8 kg (220 lb)   SpO2 97%   BMI 32.49 kg/m²     Physical Exam   Constitutional: He is oriented to person, place, and time. He appears well-developed and well-nourished. No distress.   Eyes: Pupils are equal, round, and reactive to light. Conjunctivae and EOM are normal.   Neck: Normal range of motion. Neck supple. No JVD present.   Cardiovascular: Normal rate, regular rhythm, normal heart sounds and intact distal pulses. Exam reveals no gallop and no friction rub.   No murmur heard.  Pulses:       Carotid pulses are 2+ on the right side and 2+ on the left side.  Pulmonary/Chest: Effort normal and breath sounds normal. No accessory muscle usage. No respiratory distress. He has no wheezes. He has no rales.   Abdominal: Soft. He exhibits no distension and no mass. There is no hepatosplenomegaly. There is no abdominal tenderness.   Protuberant.   Musculoskeletal:         General: No edema.   Neurological: He is alert and oriented to person, place, and time.   Skin: Skin is warm, dry and intact. No rash noted. No cyanosis. Nails show no clubbing.   Psychiatric: He has a normal mood and affect. His behavior is normal.   Vitals reviewed.    CARDIAC SURGERY 10/24/2018  PREOPERATIVE DIAGNOSES:  Severe multivessel coronary artery disease and angina.  POSTOPERATIVE " DIAGNOSES:  Severe multivessel coronary artery disease and angina.  PROCEDURES PERFORMED:  Coronary artery bypass grafting x3 (right internal mammary artery to mid left anterior descending artery, left internal mammary artery to first obtuse marginal branch, reverse saphenous vein graft to first diagonal branch), endoscopic vein harvesting, insertion of left femoral arterial line, and transesophageal echocardiography.    ECHOCARDIOGRAM 12/27/2019  Left ventricular ejection fraction 55%.  Normal regional wall motion.  Mild aortic regurgitation.  Mild mitral regurgitation.  Normal right ventricular function.    Assessment:     1. Coronary artery disease involving native coronary artery of native heart without angina pectoris     2. S/P CABG (coronary artery bypass graft)     3. Essential hypertension, benign     4. Dyslipidemia       Medical Decision Making:  Today's Assessment / Status / Plan:     1.  CAD.  2.  CABG, three-vessel, LIMA to LAD, SVG to OM1, SVG to D1.  10/24/2018.  3.  Hypertension.  4.  Dyslipidemia  5.  Obesity.  6.  Lumbar disc disease/chronic back pain.    Recommendation Discussion  1.  The patient is stable from a cardiac standpoint concerning his CAD.  2.  Reviewed recent blood work and his LDL is at goal less than 70.  3.  BP not at goal at least in the clinic recommend that he monitor his blood pressure daily National Guideline Goal should be at least in the 130s/70s or below as tolerated; will follow up with Dr. Ramos for blood pressure management.  4.  RTC 1 year.        Meli Ramos M.D.  [address] [address] [address]  Via Fax: [phone]

## 2020-11-11 ENCOUNTER — TELEPHONE (OUTPATIENT)
Dept: CARDIOLOGY | Facility: MEDICAL CENTER | Age: 65
End: 2020-11-11

## 2020-11-11 NOTE — TELEPHONE ENCOUNTER
"SW    Received message from [employer]:    \"Need a form filled out for  employer that allows him to go back  Work.\"    Thank you,  Rosey PÉREZ   "

## 2020-11-12 NOTE — TELEPHONE ENCOUNTER
"PT called back:    \"Needs a medical form saying he is  healthy enough to keep working will be  at work till 3:45p okay to call after  3:45p or leave a voice mail.\"    Thank you,  Rosey HUFF  "

## 2020-11-18 NOTE — TELEPHONE ENCOUNTER
JURGEN    TO: 1p/Mon/Cyndi  NM: Santi Landers    PH: [phone]   PT NM: Santi Landers    : 51   REG DR: Dr Palacios    RE: Please call to confirm status of  paperwork that was faxed over. Needs  filled out and returned asap so can  continue working    DISP HIST: 2020 12:57P

## 2020-11-18 NOTE — TELEPHONE ENCOUNTER
Attempted to reach patient. No answer. Patient identifier on recording. Requested a return phone call.

## 2020-11-20 NOTE — TELEPHONE ENCOUNTER
Attempted to reach patient. No answer. Left voicemail requesting a return phone call. Also advised that Dr. Palacios was off this week and will not return to the clinic until next week.  ---------------------------------------------------------------------------------------------------------------------------  Patient returned call - successfully transferred by Rosey. Patient states he needs a yearly certification signed by Dr. Palacios or his SAIRA stating he can safely continue performing his duties as a .    Advised patient that form has not been received and requested that form be faxed to 1229. Patient verbalized understanding and was appreciative for the follow-up.

## 2020-11-23 NOTE — TELEPHONE ENCOUNTER
Spoke with patient and advised that form was received. Advised patient that form will be signed and faxed to Dr. Martin and emailed to [email or web address]. Patient verbalized understanding and was appreciative for the follow-up.

## 2020-11-24 NOTE — TELEPHONE ENCOUNTER
SW    TO: 3:30p/Mon/Ofc  NM: Santi Landers   PH: [phone]   PT NM: Santi Landers   : 71   REG DR: Dr Palacios   RE: Needs Dr Palacios to sign a  form so he can go back to work  (Deadline is )   DISP HIST: 2020 03:01P

## 2020-11-24 NOTE — TELEPHONE ENCOUNTER
Form signed and faxed to Dr. Martin at [phone]. Completed fax confirmation received.    Form emailed to [email or web address].    Called patient to update. No answer. Left detailed voicemail that letter was faxed and emailed.    Copy of letter mailed to address in chart. Form placed in basket for scanning.

## 2021-08-16 ENCOUNTER — APPOINTMENT (RX ONLY)
Dept: URBAN - METROPOLITAN AREA CLINIC 4 | Facility: CLINIC | Age: 66
Setting detail: DERMATOLOGY
End: 2021-08-16

## 2021-08-16 DIAGNOSIS — L82.1 OTHER SEBORRHEIC KERATOSIS: ICD-10-CM

## 2021-08-16 DIAGNOSIS — D18.0 HEMANGIOMA: ICD-10-CM

## 2021-08-16 DIAGNOSIS — Z85.828 PERSONAL HISTORY OF OTHER MALIGNANT NEOPLASM OF SKIN: ICD-10-CM

## 2021-08-16 DIAGNOSIS — L81.4 OTHER MELANIN HYPERPIGMENTATION: ICD-10-CM

## 2021-08-16 DIAGNOSIS — Z85.820 PERSONAL HISTORY OF MALIGNANT MELANOMA OF SKIN: ICD-10-CM

## 2021-08-16 DIAGNOSIS — D22 MELANOCYTIC NEVI: ICD-10-CM

## 2021-08-16 PROBLEM — D22.5 MELANOCYTIC NEVI OF TRUNK: Status: ACTIVE | Noted: 2021-08-16

## 2021-08-16 PROBLEM — D18.01 HEMANGIOMA OF SKIN AND SUBCUTANEOUS TISSUE: Status: ACTIVE | Noted: 2021-08-16

## 2021-08-16 PROCEDURE — 99213 OFFICE O/P EST LOW 20 MIN: CPT

## 2021-08-16 PROCEDURE — ? OBSERVATION

## 2021-08-16 PROCEDURE — ? COUNSELING

## 2021-08-16 ASSESSMENT — LOCATION ZONE DERM
LOCATION ZONE: ARM
LOCATION ZONE: TRUNK
LOCATION ZONE: FACE

## 2021-08-16 ASSESSMENT — LOCATION DETAILED DESCRIPTION DERM
LOCATION DETAILED: SUPERIOR MID FOREHEAD
LOCATION DETAILED: RIGHT CLAVICULAR SKIN
LOCATION DETAILED: LEFT MEDIAL UPPER BACK
LOCATION DETAILED: RIGHT INFERIOR MEDIAL UPPER BACK
LOCATION DETAILED: RIGHT SUPERIOR MEDIAL MIDBACK
LOCATION DETAILED: LEFT ANTERIOR DISTAL UPPER ARM

## 2021-08-16 ASSESSMENT — LOCATION SIMPLE DESCRIPTION DERM
LOCATION SIMPLE: RIGHT LOWER BACK
LOCATION SIMPLE: RIGHT CLAVICULAR SKIN
LOCATION SIMPLE: LEFT UPPER BACK
LOCATION SIMPLE: SUPERIOR FOREHEAD
LOCATION SIMPLE: RIGHT UPPER BACK
LOCATION SIMPLE: LEFT UPPER ARM

## 2021-08-16 NOTE — PROCEDURE: COUNSELING
When Should The Patient Follow-Up For Their Next Full-Body Skin Exam?: 1 Year
Detail Level: Zone
Quality 137: Melanoma: Continuity Of Care - Recall System: Patient information entered into a recall system that includes: target date for the next exam specified AND a process to follow up with patients regarding missed or unscheduled appointments
Detail Level: Detailed

## 2021-10-12 ENCOUNTER — TELEPHONE (OUTPATIENT)
Dept: CARDIOLOGY | Facility: MEDICAL CENTER | Age: 66
End: 2021-10-12

## 2021-10-27 ENCOUNTER — TELEPHONE (OUTPATIENT)
Dept: CARDIOLOGY | Facility: MEDICAL CENTER | Age: 66
End: 2021-10-27

## 2021-10-27 NOTE — TELEPHONE ENCOUNTER
JURGEN      PT called to advise he will be sending a fax to the office. 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 He does have an appt. With SC 11-16-21 and is hoping to walk out with it filled out same day.      Thank you,    Becky ALMAZAN

## 2021-10-28 NOTE — TELEPHONE ENCOUNTER
Called patient wife back. Advised to refax on Monday due to technical issues all week.   Asked if it is [facility] paperwork and she stated, he is a  in [place] and needs all information in by November 24th.   Aristides Lim cleared physical in truck   Advised paperwork is 7-10 days business days for completion, but I will notify SC and her RN once I receive paperwork. Advised there is no sooner appts available at this time. She verbalized understanding. Answered all questions and concerns, appreciative of call.

## 2021-10-28 NOTE — TELEPHONE ENCOUNTER
PT's wife, Mary Anne, called because she is trying to fax the clearance paperwork and the fax is not going through. She is wanting someone to reach out and let her know of there is another way to send it in.     Best Contact Number: [phone]

## 2021-11-01 NOTE — TELEPHONE ENCOUNTER
"Received paperwork from Thomas [employer]APIA.     Called and notified the patient wife we have received paperwork and  that this should be filled out at his [facility] with SC. Advised if there is any testing that needs to be ordered she will not be able to sign until the results are posted. She verbalized understanding stating \"I will let Vladimir know that\". Advised I will give to SC RN and notify SC. Answered all questions and concerns, appreciative of call.     Routed to SC and Tori FERNANDEZ RN.   Updated patient appt notes.   Copy made and scanned into [employer].   "

## 2021-11-01 NOTE — TELEPHONE ENCOUNTER
Spoke to Thomas TAPIA and Paula TAPIA if they are having issues with the fax machine and they stated not that they are aware of. Advised I have a patient trying to fax us papers and they cannot get it to go through. Paula ANGEL Stated to have her fax it to [phone].     Called and spoke to patient wife Mary Anne. Advised to send to the other fax number, she stated she will send it right now. I advised I have also let the schedulers know who see the faxes to let me know when it comes through. Answered all questions and concerns, appreciative of call.

## 2021-11-01 NOTE — TELEPHONE ENCOUNTER
PT's wife called back again and said it should have gone through successfully. The cover letter does have a type-o and says Eddie Edward, she wanted to make sure we still get it.     Thank you

## 2021-11-01 NOTE — TELEPHONE ENCOUNTER
PT's wife, Mary Anne, called because she is trying to fax the clearance paperwork and the fax is still not going through. She is wanting someone to reach out and let her know of there is another way to send it in.      Best Contact Number: [phone]

## 2021-11-16 ENCOUNTER — OFFICE VISIT (OUTPATIENT)
Dept: CARDIOLOGY | Facility: MEDICAL CENTER | Age: 66
End: 2021-11-16
Payer: COMMERCIAL

## 2021-11-16 ENCOUNTER — TELEPHONE (OUTPATIENT)
Dept: CARDIOLOGY | Facility: MEDICAL CENTER | Age: 66
End: 2021-11-16

## 2021-11-16 VITALS
RESPIRATION RATE: 14 BRPM | WEIGHT: 219 LBS | HEART RATE: 75 BPM | DIASTOLIC BLOOD PRESSURE: 82 MMHG | OXYGEN SATURATION: 96 % | HEIGHT: 69 IN | BODY MASS INDEX: 32.44 KG/M2 | SYSTOLIC BLOOD PRESSURE: 144 MMHG

## 2021-11-16 DIAGNOSIS — I10 ESSENTIAL HYPERTENSION, BENIGN: ICD-10-CM

## 2021-11-16 DIAGNOSIS — Z95.1 S/P CABG (CORONARY ARTERY BYPASS GRAFT): ICD-10-CM

## 2021-11-16 DIAGNOSIS — I25.10 CORONARY ARTERY DISEASE INVOLVING NATIVE CORONARY ARTERY OF NATIVE HEART WITHOUT ANGINA PECTORIS: ICD-10-CM

## 2021-11-16 DIAGNOSIS — E78.5 DYSLIPIDEMIA: ICD-10-CM

## 2021-11-16 PROCEDURE — 99214 OFFICE O/P EST MOD 30 MIN: CPT | Performed by: NURSE PRACTITIONER

## 2021-11-16 RX ORDER — ATORVASTATIN CALCIUM 80 MG/1
80 TABLET, FILM COATED ORAL EVERY EVENING
COMMUNITY
Start: 2021-11-16

## 2021-11-16 RX ORDER — SILDENAFIL 25 MG/1
100 TABLET, FILM COATED ORAL PRN
Status: ON HOLD | COMMUNITY
End: 2022-06-29

## 2021-11-16 RX ORDER — OMEPRAZOLE 20 MG/1
20 CAPSULE, DELAYED RELEASE ORAL
Status: ON HOLD | COMMUNITY
Start: 2021-11-15 | End: 2022-06-29

## 2021-11-16 RX ORDER — HYDROCHLOROTHIAZIDE 25 MG/1
25 TABLET ORAL DAILY
COMMUNITY

## 2021-11-16 ASSESSMENT — ENCOUNTER SYMPTOMS
PALPITATIONS: 0
PND: 0
ORTHOPNEA: 0
CLAUDICATION: 0
SHORTNESS OF BREATH: 0
BACK PAIN: 1
MYALGIAS: 0
ABDOMINAL PAIN: 0
DIZZINESS: 0
FEVER: 0
COUGH: 0

## 2021-11-16 NOTE — PROGRESS NOTES
Chief Complaint   Patient presents with   • Dyslipidemia   • Coronary Artery Disease     F/V Dx: Coronary artery disease involving native coronary artery of native heart without angina pectoris   • Hypertension     F/V Dx: Essential hypertension, benign     Subjective:   Santi Landers is a 63 y.o. male who presents today for 12 month follow up for his coronary disease.      He is a patient of Dr. Palacios in our office.    Hx of CAD with CABG X3 in '18, HLD, HTN, obesity, and chronic back pain.    He lives in [place], [place].     He is feeling overall great. He is still working as a  in [place], [place]. 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    He does have some slightly higher BP readings at home, he will watch this and work with his PCP on management.    He has no other cardiac complaints. He has no chest pain, shortness of breath, edema, dizziness/lightheadedness, or palpitations.    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    Chest burning while dancing   • Arthritis    • CAD (coronary artery disease)     S/P CABG   • High cholesterol    • Hypertension    • Pain     Low back pain     Past Surgical History:   Procedure Laterality Date   • MULTIPLE CORONARY ARTERY BYPASS ENDO VEIN HARVEST  10/24/2018    Procedure: MULTIPLE CORONARY ARTERY BYPASS x 3 ENDO VEIN HARVEST- RIGHT LEG, BILATERAL ANTERIOR MAMMARY ARTERY HARVEST;  Surgeon: Berhane Canela M.D.;  Location: SURGERY [facility];  Service: Cardiac   • LORENA  10/24/2018    Procedure: LORENA;  Surgeon: Berhane Canela M.D.;  Location: SURGERY [facility];  Service: Cardiac   • CHOLECYSTECTOMY  1989   • ZZZ CARDIAC CATH       History reviewed. No pertinent family history.  Social History     Socioeconomic History   • Marital status:      Spouse name: Not on file   • Number of children: Not on file   • Years of education: Not on file   • Highest education level: Not on file   Occupational History   •  Not on file   Tobacco Use   • Smoking status: Never Smoker   • Smokeless tobacco: Never Used   Substance and Sexual Activity   • Alcohol use: Yes     Comment: 2-3 drinks about 5 days a week   • Drug use: No   • Sexual activity: Not on file   Other Topics Concern   • Not on file   Social History Narrative   • Not on file     Social Determinants of Health     Financial Resource Strain:    • Difficulty of Paying Living Expenses: Not on file   Food Insecurity:    • Worried About Running Out of Food in the Last Year: Not on file   • Ran Out of Food in the Last Year: Not on file   Transportation Needs:    • Lack of Transportation (Medical): Not on file   • Lack of Transportation (Non-Medical): Not on file   Physical Activity:    • Days of Exercise per Week: Not on file   • Minutes of Exercise per Session: Not on file   Stress:    • Feeling of Stress : Not on file   Social Connections:    • Frequency of Communication with Friends and Family: Not on file   • Frequency of Social Gatherings with Friends and Family: Not on file   • Attends [employer] Services: Not on file   • Active Member of Clubs or Organizations: Not on file   • Attends Club or Organization Meetings: Not on file   • Marital Status: Not on file   Intimate Partner Violence:    • Fear of Current or Ex-Partner: Not on file   • Emotionally Abused: Not on file   • Physically Abused: Not on file   • Sexually Abused: Not on file   Housing Stability:    • Unable to Pay for Housing in the Last Year: Not on file   • Number of Places Lived in the Last Year: Not on file   • Unstable Housing in the Last Year: Not on file     No Known Allergies  Outpatient Encounter Medications as of 11/16/2021   Medication Sig Dispense Refill   • hydroCHLOROthiazide (HYDRODIURIL) 25 MG Tab Take 25 mg by mouth every day.     • sildenafil citrate (VIAGRA) 25 MG Tab Take 100 mg by mouth as needed for Erectile Dysfunction.     • atorvastatin (LIPITOR) 80 MG tablet Take 1 Tablet by mouth every  "evening.     • metoprolol (LOPRESSOR) 50 MG Tab Take 1 Tab by mouth 2 times a day. 180 Tab 3   • amLODIPine (NORVASC) 5 MG Tab Take 5 mg by mouth every evening. 90 Tab 3   • aspirin EC 81 MG EC tablet Take 1 Tab by mouth every day. 30 Tab 3   • therapeutic multivitamin-minerals (THERAGRAN-M) Tab Take 1 Tab by mouth every day.     • gabapentin (NEURONTIN) 300 MG Cap TAKE 900MG THREE A DAY     • lidocaine (LIDODERM) 5 % Patch PLACE 1-3 PATCHES ON THE SKIN ANYWHERE YOU HAVE PAIN. WEAR 12 HOURS ON AND 12 HOURS OFF  0   • cyclobenzaprine (FLEXERIL) 10 MG Tab TAKE 1 TABLET BY MOUTH EVERYDAY AT BEDTIME  0   • omeprazole (PRILOSEC) 20 MG delayed-release capsule      • [DISCONTINUED] atorvastatin (LIPITOR) 80 MG tablet Take 1 Tab by mouth every day. 90 Tab 3     No facility-administered encounter medications on file as of 11/16/2021.     Review of Systems   Constitutional: Negative for fever and malaise/fatigue.   Respiratory: Negative for cough and shortness of breath.    Cardiovascular: Negative for chest pain, palpitations, orthopnea, claudication, leg swelling and PND.   Gastrointestinal: Negative for abdominal pain.   Musculoskeletal: Positive for back pain. Negative for myalgias.        Chronic but managing well with pain management team   Neurological: Negative for dizziness.        Objective:   /82 (BP Location: Left arm, Patient Position: Sitting, BP Cuff Size: Adult)   Pulse 75   Resp 14   Ht 1.753 m (5' 9\")   Wt 99.3 kg (219 lb)   SpO2 96%   BMI 32.34 kg/m²     Physical Exam  Vitals and nursing note reviewed.   Constitutional:       Appearance: Normal appearance. He is well-developed. He is obese.   HENT:      Head: Normocephalic and atraumatic.   Neck:      Vascular: No JVD.   Cardiovascular:      Rate and Rhythm: Normal rate and regular rhythm.      Pulses: Normal pulses.      Heart sounds: Normal heart sounds.   Pulmonary:      Effort: Pulmonary effort is normal.      Breath sounds: Normal breath " sounds.   Musculoskeletal:         General: Normal range of motion.      Comments: Mild edema in R ankle   Skin:     General: Skin is warm and dry.      Capillary Refill: Capillary refill takes less than 2 seconds.   Neurological:      General: No focal deficit present.      Mental Status: He is alert and oriented to person, place, and time. Mental status is at baseline.   Psychiatric:         Mood and Affect: Mood normal.         Behavior: Behavior normal.         Thought Content: Thought content normal.         Judgment: Judgment normal.         Assessment:     1. Dyslipidemia  atorvastatin (LIPITOR) 80 MG tablet   2. Essential hypertension, benign  atorvastatin (LIPITOR) 80 MG tablet   3. Coronary artery disease involving native coronary artery of native heart without angina pectoris     4. S/P CABG (coronary artery bypass graft)       Medical Decision Making:  Today's Assessment / Status / Plan:     1. CAD S/P CABG X3 in '18  -no angina or JANG  -cont asa, statin, bb    2. HLD  -statin  -LDL goal <70 with CAD  -annual labs    3. HTN  -elevated at home and in office  -cont metoprolol 50 mg BID, HCTZ 25 mg QD, amlodipine 5 mg QPM-consider increase to 10 mg   -follow at home and with PCP office  -BP goal <130/80    FU in clinic in 12 months with Darleen DILL in clinic with annual labs

## 2021-11-17 NOTE — TELEPHONE ENCOUNTER
Blood work results request sent to [facility]. Fax: [phone]. Fax confirmation received.

## 2021-11-29 ENCOUNTER — TELEPHONE (OUTPATIENT)
Dept: CARDIOLOGY | Facility: MEDICAL CENTER | Age: 66
End: 2021-11-29

## 2021-11-29 DIAGNOSIS — E78.5 DYSLIPIDEMIA: ICD-10-CM

## 2021-11-29 DIAGNOSIS — I25.10 CORONARY ARTERY DISEASE INVOLVING NATIVE CORONARY ARTERY OF NATIVE HEART WITHOUT ANGINA PECTORIS: ICD-10-CM

## 2021-11-29 DIAGNOSIS — I10 ESSENTIAL HYPERTENSION, BENIGN: ICD-10-CM

## 2021-11-29 NOTE — TELEPHONE ENCOUNTER
Received: Today  JUVENCIO Blanco R.N.  Labs reviewed from [facility]. No cholesterol panel. Please order cmp and lipid to be done at beginning of new year and let patient know.     Thanks,     Lourdes :)   ==========================      LVM with pt at [phone] notifying that 11/15 labs from [facility] didn't include a cholesterol check, which we would like him to complete after the first of the new year. Orders placed and mailed to pt.

## 2022-03-22 PROBLEM — M48.062 NEUROGENIC CLAUDICATION DUE TO LUMBAR SPINAL STENOSIS: Status: ACTIVE | Noted: 2022-03-22

## 2022-06-03 ENCOUNTER — TELEPHONE (OUTPATIENT)
Dept: CARDIOLOGY | Facility: MEDICAL CENTER | Age: 67
End: 2022-06-03
Payer: COMMERCIAL

## 2022-06-03 NOTE — TELEPHONE ENCOUNTER
Clearance letter generated by [facility] in March, not in Media. Notification never received from [facility]. Will discuss clearance with SC.

## 2022-06-03 NOTE — TELEPHONE ENCOUNTER
SC    Caller: Ansley,  from [facility]    Office Name, phone number, fax number: [facility], [place]: [phone], Fax: [phone]    Fax clearance to [phone]- Ansley stated that a clearance was faxed over on 03-. I believe I do see the surgical clearance in the media on 03-.     Procedure Name: L1-2, L2-3, L3-4, L4-5, L5-S1 decompressive laminectomy and bilateral foraminotomies    Procedure Scheduled Date: 06-      Thank you,  Daniela AYON

## 2022-06-03 NOTE — TELEPHONE ENCOUNTER
Clearance discussed with SC who indicates patient may proceed with L1-2, L2-3, L3-4, L4-5, L5-S1 decompressive laminectomy and bilateral foraminotomies. Clearance response faxed to [phone], receipt confirmed.

## 2022-06-22 ENCOUNTER — APPOINTMENT (OUTPATIENT)
Dept: ADMISSIONS | Facility: MEDICAL CENTER | Age: 67
End: 2022-06-22
Payer: COMMERCIAL

## 2022-06-24 ENCOUNTER — PRE-ADMISSION TESTING (OUTPATIENT)
Dept: ADMISSIONS | Facility: MEDICAL CENTER | Age: 67
End: 2022-06-24
Attending: NEUROLOGICAL SURGERY
Payer: COMMERCIAL

## 2022-06-24 ENCOUNTER — APPOINTMENT (OUTPATIENT)
Dept: ADMISSIONS | Facility: MEDICAL CENTER | Age: 67
End: 2022-06-24
Payer: COMMERCIAL

## 2022-06-24 ENCOUNTER — HOSPITAL ENCOUNTER (OUTPATIENT)
Dept: RADIOLOGY | Facility: MEDICAL CENTER | Age: 67
End: 2022-06-24
Attending: NEUROLOGICAL SURGERY | Admitting: NEUROLOGICAL SURGERY
Payer: COMMERCIAL

## 2022-06-24 DIAGNOSIS — Z01.810 PRE-OPERATIVE CARDIOVASCULAR EXAMINATION: ICD-10-CM

## 2022-06-24 DIAGNOSIS — Z01.812 PRE-OPERATIVE LABORATORY EXAMINATION: ICD-10-CM

## 2022-06-24 DIAGNOSIS — Z01.811 PRE-OPERATIVE RESPIRATORY EXAMINATION: ICD-10-CM

## 2022-06-24 LAB
25(OH)D3 SERPL-MCNC: 41 NG/ML (ref 30–100)
ABO GROUP BLD: NORMAL
ALBUMIN SERPL BCP-MCNC: 4.3 G/DL (ref 3.2–4.9)
ALBUMIN/GLOB SERPL: 1.7 G/DL
ALP SERPL-CCNC: 52 U/L (ref 30–99)
ALT SERPL-CCNC: 51 U/L (ref 2–50)
ANION GAP SERPL CALC-SCNC: 11 MMOL/L (ref 7–16)
APPEARANCE UR: CLEAR
APTT PPP: 27.3 SEC (ref 24.7–36)
AST SERPL-CCNC: 38 U/L (ref 12–45)
BASOPHILS # BLD AUTO: 1.2 % (ref 0–1.8)
BASOPHILS # BLD: 0.06 K/UL (ref 0–0.12)
BILIRUB SERPL-MCNC: 0.8 MG/DL (ref 0.1–1.5)
BILIRUB UR QL STRIP.AUTO: NEGATIVE
BLD GP AB SCN SERPL QL: NORMAL
BUN SERPL-MCNC: 17 MG/DL (ref 8–22)
CALCIUM SERPL-MCNC: 9.2 MG/DL (ref 8.5–10.5)
CHLORIDE SERPL-SCNC: 101 MMOL/L (ref 96–112)
CHOLEST SERPL-MCNC: 153 MG/DL (ref 100–199)
CO2 SERPL-SCNC: 25 MMOL/L (ref 20–33)
COLOR UR: YELLOW
CREAT SERPL-MCNC: 0.77 MG/DL (ref 0.5–1.4)
EKG IMPRESSION: NORMAL
EOSINOPHIL # BLD AUTO: 0.23 K/UL (ref 0–0.51)
EOSINOPHIL NFR BLD: 4.7 % (ref 0–6.9)
ERYTHROCYTE [DISTWIDTH] IN BLOOD BY AUTOMATED COUNT: 43.6 FL (ref 35.9–50)
EST. AVERAGE GLUCOSE BLD GHB EST-MCNC: 103 MG/DL
GFR SERPLBLD CREATININE-BSD FMLA CKD-EPI: 97 ML/MIN/1.73 M 2
GLOBULIN SER CALC-MCNC: 2.5 G/DL (ref 1.9–3.5)
GLUCOSE SERPL-MCNC: 103 MG/DL (ref 65–99)
GLUCOSE UR STRIP.AUTO-MCNC: NEGATIVE MG/DL
HBA1C MFR BLD: 5.2 % (ref 4–5.6)
HCT VFR BLD AUTO: 43.6 % (ref 42–52)
HDLC SERPL-MCNC: 64 MG/DL
HGB BLD-MCNC: 15.9 G/DL (ref 14–18)
IMM GRANULOCYTES # BLD AUTO: 0.02 K/UL (ref 0–0.11)
IMM GRANULOCYTES NFR BLD AUTO: 0.4 % (ref 0–0.9)
INR PPP: 1.03 (ref 0.87–1.13)
KETONES UR STRIP.AUTO-MCNC: NEGATIVE MG/DL
LDLC SERPL CALC-MCNC: 71 MG/DL
LEUKOCYTE ESTERASE UR QL STRIP.AUTO: NEGATIVE
LYMPHOCYTES # BLD AUTO: 1.66 K/UL (ref 1–4.8)
LYMPHOCYTES NFR BLD: 33.7 % (ref 22–41)
MCH RBC QN AUTO: 35.3 PG (ref 27–33)
MCHC RBC AUTO-ENTMCNC: 36.5 G/DL (ref 33.7–35.3)
MCV RBC AUTO: 96.7 FL (ref 81.4–97.8)
MICRO URNS: NORMAL
MONOCYTES # BLD AUTO: 0.57 K/UL (ref 0–0.85)
MONOCYTES NFR BLD AUTO: 11.6 % (ref 0–13.4)
NEUTROPHILS # BLD AUTO: 2.39 K/UL (ref 1.82–7.42)
NEUTROPHILS NFR BLD: 48.4 % (ref 44–72)
NITRITE UR QL STRIP.AUTO: NEGATIVE
NRBC # BLD AUTO: 0 K/UL
NRBC BLD-RTO: 0 /100 WBC
PH UR STRIP.AUTO: 7.5 [PH] (ref 5–8)
PLATELET # BLD AUTO: 177 K/UL (ref 164–446)
PMV BLD AUTO: 9.2 FL (ref 9–12.9)
POTASSIUM SERPL-SCNC: 4.3 MMOL/L (ref 3.6–5.5)
PROT SERPL-MCNC: 6.8 G/DL (ref 6–8.2)
PROT UR QL STRIP: NEGATIVE MG/DL
PROTHROMBIN TIME: 13.2 SEC (ref 12–14.6)
RBC # BLD AUTO: 4.51 M/UL (ref 4.7–6.1)
RBC UR QL AUTO: NEGATIVE
RH BLD: NORMAL
SODIUM SERPL-SCNC: 137 MMOL/L (ref 135–145)
SP GR UR STRIP.AUTO: 1.02
TRIGL SERPL-MCNC: 91 MG/DL (ref 0–149)
UROBILINOGEN UR STRIP.AUTO-MCNC: 1 MG/DL
WBC # BLD AUTO: 4.9 K/UL (ref 4.8–10.8)

## 2022-06-24 PROCEDURE — 81003 URINALYSIS AUTO W/O SCOPE: CPT

## 2022-06-24 PROCEDURE — 86900 BLOOD TYPING SEROLOGIC ABO: CPT

## 2022-06-24 PROCEDURE — 83036 HEMOGLOBIN GLYCOSYLATED A1C: CPT

## 2022-06-24 PROCEDURE — 86850 RBC ANTIBODY SCREEN: CPT

## 2022-06-24 PROCEDURE — 86901 BLOOD TYPING SEROLOGIC RH(D): CPT

## 2022-06-24 PROCEDURE — 93010 ELECTROCARDIOGRAM REPORT: CPT | Performed by: INTERNAL MEDICINE

## 2022-06-24 PROCEDURE — 85025 COMPLETE CBC W/AUTO DIFF WBC: CPT

## 2022-06-24 PROCEDURE — 80061 LIPID PANEL: CPT

## 2022-06-24 PROCEDURE — 85610 PROTHROMBIN TIME: CPT

## 2022-06-24 PROCEDURE — 36415 COLL VENOUS BLD VENIPUNCTURE: CPT

## 2022-06-24 PROCEDURE — 93005 ELECTROCARDIOGRAM TRACING: CPT

## 2022-06-24 PROCEDURE — 71046 X-RAY EXAM CHEST 2 VIEWS: CPT

## 2022-06-24 PROCEDURE — 80053 COMPREHEN METABOLIC PANEL: CPT

## 2022-06-24 PROCEDURE — 85730 THROMBOPLASTIN TIME PARTIAL: CPT

## 2022-06-24 PROCEDURE — 82306 VITAMIN D 25 HYDROXY: CPT

## 2022-06-29 ENCOUNTER — ANESTHESIA EVENT (OUTPATIENT)
Dept: SURGERY | Facility: MEDICAL CENTER | Age: 67
End: 2022-06-29
Payer: COMMERCIAL

## 2022-06-29 ENCOUNTER — HOSPITAL ENCOUNTER (OUTPATIENT)
Facility: MEDICAL CENTER | Age: 67
End: 2022-07-01
Attending: NEUROLOGICAL SURGERY | Admitting: NEUROLOGICAL SURGERY
Payer: COMMERCIAL

## 2022-06-29 ENCOUNTER — APPOINTMENT (OUTPATIENT)
Dept: RADIOLOGY | Facility: MEDICAL CENTER | Age: 67
End: 2022-06-29
Attending: NEUROLOGICAL SURGERY
Payer: COMMERCIAL

## 2022-06-29 ENCOUNTER — ANESTHESIA (OUTPATIENT)
Dept: SURGERY | Facility: MEDICAL CENTER | Age: 67
End: 2022-06-29
Payer: COMMERCIAL

## 2022-06-29 DIAGNOSIS — M48.062 NEUROGENIC CLAUDICATION DUE TO LUMBAR SPINAL STENOSIS: ICD-10-CM

## 2022-06-29 DIAGNOSIS — G89.18 POSTOPERATIVE PAIN: ICD-10-CM

## 2022-06-29 PROCEDURE — 700101 HCHG RX REV CODE 250: Performed by: ANESTHESIOLOGY

## 2022-06-29 PROCEDURE — 700102 HCHG RX REV CODE 250 W/ 637 OVERRIDE(OP): Performed by: ANESTHESIOLOGY

## 2022-06-29 PROCEDURE — 700111 HCHG RX REV CODE 636 W/ 250 OVERRIDE (IP): Performed by: PHYSICIAN ASSISTANT

## 2022-06-29 PROCEDURE — 700111 HCHG RX REV CODE 636 W/ 250 OVERRIDE (IP): Performed by: ANESTHESIOLOGY

## 2022-06-29 PROCEDURE — A4306 DRUG DELIVERY SYSTEM <=50 ML: HCPCS | Performed by: NEUROLOGICAL SURGERY

## 2022-06-29 PROCEDURE — 160029 HCHG SURGERY MINUTES - 1ST 30 MINS LEVEL 4: Performed by: NEUROLOGICAL SURGERY

## 2022-06-29 PROCEDURE — 700101 HCHG RX REV CODE 250: Performed by: PHYSICIAN ASSISTANT

## 2022-06-29 PROCEDURE — 00670 ANES XTNSV SP&SPI CORD PX: CPT | Performed by: ANESTHESIOLOGY

## 2022-06-29 PROCEDURE — 110371 HCHG SHELL REV 272: Performed by: NEUROLOGICAL SURGERY

## 2022-06-29 PROCEDURE — G0378 HOSPITAL OBSERVATION PER HR: HCPCS

## 2022-06-29 PROCEDURE — 160048 HCHG OR STATISTICAL LEVEL 1-5: Performed by: NEUROLOGICAL SURGERY

## 2022-06-29 PROCEDURE — 700105 HCHG RX REV CODE 258: Performed by: NEUROLOGICAL SURGERY

## 2022-06-29 PROCEDURE — 160035 HCHG PACU - 1ST 60 MINS PHASE I: Performed by: NEUROLOGICAL SURGERY

## 2022-06-29 PROCEDURE — 160009 HCHG ANES TIME/MIN: Performed by: NEUROLOGICAL SURGERY

## 2022-06-29 PROCEDURE — 700105 HCHG RX REV CODE 258: Performed by: ANESTHESIOLOGY

## 2022-06-29 PROCEDURE — 160002 HCHG RECOVERY MINUTES (STAT): Performed by: NEUROLOGICAL SURGERY

## 2022-06-29 PROCEDURE — 63048 LAM FACETEC &FORAMOT EA ADDL: CPT | Mod: 59 | Performed by: NEUROLOGICAL SURGERY

## 2022-06-29 PROCEDURE — 63047 LAM FACETEC & FORAMOT LUMBAR: CPT | Mod: ASROC | Performed by: PHYSICIAN ASSISTANT

## 2022-06-29 PROCEDURE — 700102 HCHG RX REV CODE 250 W/ 637 OVERRIDE(OP): Performed by: PHYSICIAN ASSISTANT

## 2022-06-29 PROCEDURE — 63047 LAM FACETEC & FORAMOT LUMBAR: CPT | Performed by: NEUROLOGICAL SURGERY

## 2022-06-29 PROCEDURE — 96376 TX/PRO/DX INJ SAME DRUG ADON: CPT | Mod: XU

## 2022-06-29 PROCEDURE — 96365 THER/PROPH/DIAG IV INF INIT: CPT | Mod: XU

## 2022-06-29 PROCEDURE — A9270 NON-COVERED ITEM OR SERVICE: HCPCS | Performed by: PHYSICIAN ASSISTANT

## 2022-06-29 PROCEDURE — 63048 LAM FACETEC &FORAMOT EA ADDL: CPT | Mod: ASROC,59 | Performed by: PHYSICIAN ASSISTANT

## 2022-06-29 PROCEDURE — A9270 NON-COVERED ITEM OR SERVICE: HCPCS | Performed by: ANESTHESIOLOGY

## 2022-06-29 PROCEDURE — 160036 HCHG PACU - EA ADDL 30 MINS PHASE I: Performed by: NEUROLOGICAL SURGERY

## 2022-06-29 PROCEDURE — 700101 HCHG RX REV CODE 250: Performed by: NEUROLOGICAL SURGERY

## 2022-06-29 PROCEDURE — 160041 HCHG SURGERY MINUTES - EA ADDL 1 MIN LEVEL 4: Performed by: NEUROLOGICAL SURGERY

## 2022-06-29 PROCEDURE — 110454 HCHG SHELL REV 250: Performed by: NEUROLOGICAL SURGERY

## 2022-06-29 PROCEDURE — 700111 HCHG RX REV CODE 636 W/ 250 OVERRIDE (IP): Performed by: NEUROLOGICAL SURGERY

## 2022-06-29 PROCEDURE — 72020 X-RAY EXAM OF SPINE 1 VIEW: CPT

## 2022-06-29 PROCEDURE — 96375 TX/PRO/DX INJ NEW DRUG ADDON: CPT | Mod: XU

## 2022-06-29 RX ORDER — MORPHINE SULFATE 4 MG/ML
2 INJECTION INTRAVENOUS ONCE
Status: ACTIVE | OUTPATIENT
Start: 2022-06-29 | End: 2022-06-30

## 2022-06-29 RX ORDER — OXYCODONE HCL 5 MG/5 ML
5 SOLUTION, ORAL ORAL
Status: COMPLETED | OUTPATIENT
Start: 2022-06-29 | End: 2022-06-29

## 2022-06-29 RX ORDER — OXYCODONE HCL 5 MG/5 ML
10 SOLUTION, ORAL ORAL
Status: COMPLETED | OUTPATIENT
Start: 2022-06-29 | End: 2022-06-29

## 2022-06-29 RX ORDER — HYDROMORPHONE HYDROCHLORIDE 1 MG/ML
0.4 INJECTION, SOLUTION INTRAMUSCULAR; INTRAVENOUS; SUBCUTANEOUS
Status: DISCONTINUED | OUTPATIENT
Start: 2022-06-29 | End: 2022-06-29 | Stop reason: HOSPADM

## 2022-06-29 RX ORDER — KETOROLAC TROMETHAMINE 30 MG/ML
INJECTION, SOLUTION INTRAMUSCULAR; INTRAVENOUS PRN
Status: DISCONTINUED | OUTPATIENT
Start: 2022-06-29 | End: 2022-06-29 | Stop reason: SURG

## 2022-06-29 RX ORDER — ENEMA 19; 7 G/133ML; G/133ML
1 ENEMA RECTAL
Status: DISCONTINUED | OUTPATIENT
Start: 2022-06-29 | End: 2022-07-01 | Stop reason: HOSPADM

## 2022-06-29 RX ORDER — HYDROMORPHONE HYDROCHLORIDE 1 MG/ML
0.1 INJECTION, SOLUTION INTRAMUSCULAR; INTRAVENOUS; SUBCUTANEOUS
Status: DISCONTINUED | OUTPATIENT
Start: 2022-06-29 | End: 2022-06-29 | Stop reason: HOSPADM

## 2022-06-29 RX ORDER — MEPERIDINE HYDROCHLORIDE 25 MG/ML
12.5 INJECTION INTRAMUSCULAR; INTRAVENOUS; SUBCUTANEOUS
Status: DISCONTINUED | OUTPATIENT
Start: 2022-06-29 | End: 2022-06-29 | Stop reason: HOSPADM

## 2022-06-29 RX ORDER — GABAPENTIN 300 MG/1
900 CAPSULE ORAL 3 TIMES DAILY
Status: DISCONTINUED | OUTPATIENT
Start: 2022-06-30 | End: 2022-07-01 | Stop reason: HOSPADM

## 2022-06-29 RX ORDER — DIPHENHYDRAMINE HYDROCHLORIDE 50 MG/ML
12.5 INJECTION INTRAMUSCULAR; INTRAVENOUS
Status: DISCONTINUED | OUTPATIENT
Start: 2022-06-29 | End: 2022-06-29 | Stop reason: HOSPADM

## 2022-06-29 RX ORDER — ONDANSETRON 2 MG/ML
4 INJECTION INTRAMUSCULAR; INTRAVENOUS ONCE
Status: DISCONTINUED | OUTPATIENT
Start: 2022-06-29 | End: 2022-06-29 | Stop reason: HOSPADM

## 2022-06-29 RX ORDER — VANCOMYCIN HYDROCHLORIDE 1 G/20ML
INJECTION, POWDER, LYOPHILIZED, FOR SOLUTION INTRAVENOUS
Status: COMPLETED | OUTPATIENT
Start: 2022-06-29 | End: 2022-06-29

## 2022-06-29 RX ORDER — SODIUM CHLORIDE AND POTASSIUM CHLORIDE 150; 900 MG/100ML; MG/100ML
INJECTION, SOLUTION INTRAVENOUS CONTINUOUS
Status: DISPENSED | OUTPATIENT
Start: 2022-06-29 | End: 2022-06-30

## 2022-06-29 RX ORDER — SODIUM CHLORIDE, SODIUM LACTATE, POTASSIUM CHLORIDE, CALCIUM CHLORIDE 600; 310; 30; 20 MG/100ML; MG/100ML; MG/100ML; MG/100ML
INJECTION, SOLUTION INTRAVENOUS CONTINUOUS
Status: ACTIVE | OUTPATIENT
Start: 2022-06-29 | End: 2022-06-29

## 2022-06-29 RX ORDER — OMEPRAZOLE 20 MG/1
20 CAPSULE, DELAYED RELEASE ORAL DAILY
Status: DISCONTINUED | OUTPATIENT
Start: 2022-06-30 | End: 2022-07-01 | Stop reason: HOSPADM

## 2022-06-29 RX ORDER — MORPHINE SULFATE 4 MG/ML
2 INJECTION INTRAVENOUS
Status: DISCONTINUED | OUTPATIENT
Start: 2022-06-29 | End: 2022-06-30

## 2022-06-29 RX ORDER — DOCUSATE SODIUM 100 MG/1
100 CAPSULE, LIQUID FILLED ORAL 2 TIMES DAILY
Status: DISCONTINUED | OUTPATIENT
Start: 2022-06-29 | End: 2022-07-01 | Stop reason: HOSPADM

## 2022-06-29 RX ORDER — AMOXICILLIN 250 MG
1 CAPSULE ORAL NIGHTLY
Status: DISCONTINUED | OUTPATIENT
Start: 2022-06-29 | End: 2022-07-01 | Stop reason: HOSPADM

## 2022-06-29 RX ORDER — CEFAZOLIN SODIUM 1 G/3ML
INJECTION, POWDER, FOR SOLUTION INTRAMUSCULAR; INTRAVENOUS PRN
Status: DISCONTINUED | OUTPATIENT
Start: 2022-06-29 | End: 2022-06-29 | Stop reason: SURG

## 2022-06-29 RX ORDER — HYDROCHLOROTHIAZIDE 25 MG/1
25 TABLET ORAL DAILY
Status: DISCONTINUED | OUTPATIENT
Start: 2022-06-30 | End: 2022-07-01 | Stop reason: HOSPADM

## 2022-06-29 RX ORDER — DIPHENHYDRAMINE HYDROCHLORIDE 50 MG/ML
25 INJECTION INTRAMUSCULAR; INTRAVENOUS EVERY 6 HOURS PRN
Status: DISCONTINUED | OUTPATIENT
Start: 2022-06-29 | End: 2022-07-01 | Stop reason: HOSPADM

## 2022-06-29 RX ORDER — SODIUM CHLORIDE, SODIUM LACTATE, POTASSIUM CHLORIDE, CALCIUM CHLORIDE 600; 310; 30; 20 MG/100ML; MG/100ML; MG/100ML; MG/100ML
INJECTION, SOLUTION INTRAVENOUS CONTINUOUS
Status: DISCONTINUED | OUTPATIENT
Start: 2022-06-29 | End: 2022-06-29 | Stop reason: HOSPADM

## 2022-06-29 RX ORDER — AMOXICILLIN 250 MG
1 CAPSULE ORAL
Status: DISCONTINUED | OUTPATIENT
Start: 2022-06-29 | End: 2022-07-01 | Stop reason: HOSPADM

## 2022-06-29 RX ORDER — METOPROLOL TARTRATE 50 MG/1
50 TABLET, FILM COATED ORAL 2 TIMES DAILY
Status: DISCONTINUED | OUTPATIENT
Start: 2022-06-29 | End: 2022-07-01 | Stop reason: HOSPADM

## 2022-06-29 RX ORDER — DEXAMETHASONE SODIUM PHOSPHATE 4 MG/ML
INJECTION, SOLUTION INTRA-ARTICULAR; INTRALESIONAL; INTRAMUSCULAR; INTRAVENOUS; SOFT TISSUE PRN
Status: DISCONTINUED | OUTPATIENT
Start: 2022-06-29 | End: 2022-06-29 | Stop reason: SURG

## 2022-06-29 RX ORDER — LABETALOL HYDROCHLORIDE 5 MG/ML
10 INJECTION, SOLUTION INTRAVENOUS
Status: DISCONTINUED | OUTPATIENT
Start: 2022-06-29 | End: 2022-07-01 | Stop reason: HOSPADM

## 2022-06-29 RX ORDER — POLYETHYLENE GLYCOL 3350 17 G/17G
1 POWDER, FOR SOLUTION ORAL 2 TIMES DAILY PRN
Status: DISCONTINUED | OUTPATIENT
Start: 2022-06-29 | End: 2022-07-01 | Stop reason: HOSPADM

## 2022-06-29 RX ORDER — HYDROMORPHONE HYDROCHLORIDE 1 MG/ML
0.2 INJECTION, SOLUTION INTRAMUSCULAR; INTRAVENOUS; SUBCUTANEOUS
Status: DISCONTINUED | OUTPATIENT
Start: 2022-06-29 | End: 2022-06-29 | Stop reason: HOSPADM

## 2022-06-29 RX ORDER — ALPRAZOLAM 0.25 MG/1
0.25 TABLET ORAL 2 TIMES DAILY PRN
Status: DISCONTINUED | OUTPATIENT
Start: 2022-06-29 | End: 2022-07-01 | Stop reason: HOSPADM

## 2022-06-29 RX ORDER — CEFAZOLIN SODIUM 1 G/3ML
INJECTION, POWDER, FOR SOLUTION INTRAMUSCULAR; INTRAVENOUS
Status: DISCONTINUED | OUTPATIENT
Start: 2022-06-29 | End: 2022-06-29 | Stop reason: HOSPADM

## 2022-06-29 RX ORDER — SODIUM CHLORIDE, SODIUM LACTATE, POTASSIUM CHLORIDE, CALCIUM CHLORIDE 600; 310; 30; 20 MG/100ML; MG/100ML; MG/100ML; MG/100ML
INJECTION, SOLUTION INTRAVENOUS
Status: DISCONTINUED | OUTPATIENT
Start: 2022-06-29 | End: 2022-06-29 | Stop reason: SURG

## 2022-06-29 RX ORDER — BISACODYL 10 MG
10 SUPPOSITORY, RECTAL RECTAL
Status: DISCONTINUED | OUTPATIENT
Start: 2022-06-29 | End: 2022-07-01 | Stop reason: HOSPADM

## 2022-06-29 RX ORDER — ONDANSETRON 4 MG/1
4 TABLET, ORALLY DISINTEGRATING ORAL EVERY 4 HOURS PRN
Status: DISCONTINUED | OUTPATIENT
Start: 2022-06-29 | End: 2022-07-01 | Stop reason: HOSPADM

## 2022-06-29 RX ORDER — ONDANSETRON 2 MG/ML
4 INJECTION INTRAMUSCULAR; INTRAVENOUS EVERY 4 HOURS PRN
Status: DISCONTINUED | OUTPATIENT
Start: 2022-06-29 | End: 2022-07-01 | Stop reason: HOSPADM

## 2022-06-29 RX ORDER — ACETAMINOPHEN 500 MG
1000 TABLET ORAL EVERY 6 HOURS PRN
Status: DISCONTINUED | OUTPATIENT
Start: 2022-07-05 | End: 2022-07-01 | Stop reason: HOSPADM

## 2022-06-29 RX ORDER — ACETAMINOPHEN 500 MG
1000 TABLET ORAL EVERY 6 HOURS
Status: DISCONTINUED | OUTPATIENT
Start: 2022-06-30 | End: 2022-07-01 | Stop reason: HOSPADM

## 2022-06-29 RX ORDER — ONDANSETRON 2 MG/ML
INJECTION INTRAMUSCULAR; INTRAVENOUS PRN
Status: DISCONTINUED | OUTPATIENT
Start: 2022-06-29 | End: 2022-06-29 | Stop reason: SURG

## 2022-06-29 RX ORDER — TIZANIDINE 4 MG/1
4 TABLET ORAL 3 TIMES DAILY PRN
Status: DISCONTINUED | OUTPATIENT
Start: 2022-06-29 | End: 2022-07-01 | Stop reason: HOSPADM

## 2022-06-29 RX ORDER — BUPIVACAINE HYDROCHLORIDE AND EPINEPHRINE 5; 5 MG/ML; UG/ML
INJECTION, SOLUTION EPIDURAL; INTRACAUDAL; PERINEURAL
Status: DISCONTINUED | OUTPATIENT
Start: 2022-06-29 | End: 2022-06-29 | Stop reason: HOSPADM

## 2022-06-29 RX ORDER — DIPHENHYDRAMINE HCL 25 MG
25 TABLET ORAL EVERY 6 HOURS PRN
Status: DISCONTINUED | OUTPATIENT
Start: 2022-06-29 | End: 2022-07-01 | Stop reason: HOSPADM

## 2022-06-29 RX ORDER — CEFAZOLIN SODIUM 2 G/100ML
2 INJECTION, SOLUTION INTRAVENOUS EVERY 8 HOURS
Status: COMPLETED | OUTPATIENT
Start: 2022-06-29 | End: 2022-06-30

## 2022-06-29 RX ORDER — CEFAZOLIN SODIUM 1 G/3ML
2 INJECTION, POWDER, FOR SOLUTION INTRAMUSCULAR; INTRAVENOUS ONCE
Status: DISCONTINUED | OUTPATIENT
Start: 2022-06-29 | End: 2022-06-29 | Stop reason: HOSPADM

## 2022-06-29 RX ORDER — HALOPERIDOL 5 MG/ML
1 INJECTION INTRAMUSCULAR
Status: DISCONTINUED | OUTPATIENT
Start: 2022-06-29 | End: 2022-06-29 | Stop reason: HOSPADM

## 2022-06-29 RX ORDER — AMLODIPINE BESYLATE 5 MG/1
5 TABLET ORAL EVERY EVENING
Status: DISCONTINUED | OUTPATIENT
Start: 2022-06-29 | End: 2022-07-01 | Stop reason: HOSPADM

## 2022-06-29 RX ADMIN — PROPOFOL 200 MG: 10 INJECTION, EMULSION INTRAVENOUS at 13:29

## 2022-06-29 RX ADMIN — SODIUM CHLORIDE, POTASSIUM CHLORIDE, SODIUM LACTATE AND CALCIUM CHLORIDE: 600; 310; 30; 20 INJECTION, SOLUTION INTRAVENOUS at 12:55

## 2022-06-29 RX ADMIN — SUGAMMADEX 200 MG: 100 INJECTION, SOLUTION INTRAVENOUS at 15:16

## 2022-06-29 RX ADMIN — ACETAMINOPHEN 1000 MG: 500 TABLET ORAL at 23:45

## 2022-06-29 RX ADMIN — KETOROLAC TROMETHAMINE 15 MG: 30 INJECTION, SOLUTION INTRAMUSCULAR at 15:16

## 2022-06-29 RX ADMIN — SENNOSIDES AND DOCUSATE SODIUM 1 TABLET: 50; 8.6 TABLET ORAL at 22:17

## 2022-06-29 RX ADMIN — HYDROMORPHONE HYDROCHLORIDE 0.2 MG: 1 INJECTION, SOLUTION INTRAMUSCULAR; INTRAVENOUS; SUBCUTANEOUS at 17:16

## 2022-06-29 RX ADMIN — DOCUSATE SODIUM 100 MG: 100 CAPSULE, LIQUID FILLED ORAL at 22:17

## 2022-06-29 RX ADMIN — GLYCOPYRROLATE 0.4 MG: 0.2 INJECTION INTRAMUSCULAR; INTRAVENOUS at 14:00

## 2022-06-29 RX ADMIN — ROCURONIUM BROMIDE 50 MG: 10 INJECTION, SOLUTION INTRAVENOUS at 13:30

## 2022-06-29 RX ADMIN — OXYCODONE HYDROCHLORIDE 10 MG: 5 SOLUTION ORAL at 16:08

## 2022-06-29 RX ADMIN — ROPIVACAINE HYDROCHLORIDE: 2 INJECTION, SOLUTION EPIDURAL; INFILTRATION; PERINEURAL at 16:10

## 2022-06-29 RX ADMIN — ONDANSETRON 4 MG: 2 INJECTION INTRAMUSCULAR; INTRAVENOUS at 13:56

## 2022-06-29 RX ADMIN — FENTANYL CITRATE 50 MCG: 50 INJECTION, SOLUTION INTRAMUSCULAR; INTRAVENOUS at 16:35

## 2022-06-29 RX ADMIN — SODIUM CHLORIDE, SODIUM LACTATE, POTASSIUM CHLORIDE, CALCIUM CHLORIDE: 600; 310; 30; 20 INJECTION, SOLUTION INTRAVENOUS at 13:28

## 2022-06-29 RX ADMIN — ROCURONIUM BROMIDE 30 MG: 10 INJECTION, SOLUTION INTRAVENOUS at 14:55

## 2022-06-29 RX ADMIN — CEFAZOLIN 2 G: 330 INJECTION, POWDER, FOR SOLUTION INTRAMUSCULAR; INTRAVENOUS at 14:00

## 2022-06-29 RX ADMIN — FENTANYL CITRATE 50 MCG: 50 INJECTION, SOLUTION INTRAMUSCULAR; INTRAVENOUS at 15:18

## 2022-06-29 RX ADMIN — CEFAZOLIN SODIUM 2 G: 2 INJECTION, SOLUTION INTRAVENOUS at 23:44

## 2022-06-29 RX ADMIN — Medication: at 23:47

## 2022-06-29 RX ADMIN — HYDROMORPHONE HYDROCHLORIDE 0.2 MG: 1 INJECTION, SOLUTION INTRAMUSCULAR; INTRAVENOUS; SUBCUTANEOUS at 17:25

## 2022-06-29 RX ADMIN — DEXAMETHASONE SODIUM PHOSPHATE 12 MG: 4 INJECTION, SOLUTION INTRA-ARTICULAR; INTRALESIONAL; INTRAMUSCULAR; INTRAVENOUS; SOFT TISSUE at 13:56

## 2022-06-29 RX ADMIN — SODIUM CHLORIDE, POTASSIUM CHLORIDE, SODIUM LACTATE AND CALCIUM CHLORIDE: 600; 310; 30; 20 INJECTION, SOLUTION INTRAVENOUS at 13:28

## 2022-06-29 RX ADMIN — EPHEDRINE SULFATE 5 MG: 50 INJECTION INTRAMUSCULAR; INTRAVENOUS; SUBCUTANEOUS at 14:27

## 2022-06-29 RX ADMIN — HYDROMORPHONE HYDROCHLORIDE 0.2 MG: 1 INJECTION, SOLUTION INTRAMUSCULAR; INTRAVENOUS; SUBCUTANEOUS at 17:30

## 2022-06-29 RX ADMIN — HYDROMORPHONE HYDROCHLORIDE 0.4 MG: 1 INJECTION, SOLUTION INTRAMUSCULAR; INTRAVENOUS; SUBCUTANEOUS at 17:45

## 2022-06-29 RX ADMIN — CEFAZOLIN 2 G: 330 INJECTION, POWDER, FOR SOLUTION INTRAMUSCULAR; INTRAVENOUS at 13:30

## 2022-06-29 RX ADMIN — TIZANIDINE 4 MG: 4 TABLET ORAL at 17:06

## 2022-06-29 RX ADMIN — ROCURONIUM BROMIDE 30 MG: 10 INJECTION, SOLUTION INTRAVENOUS at 14:05

## 2022-06-29 RX ADMIN — SODIUM CHLORIDE AND POTASSIUM CHLORIDE: 9; 1.49 INJECTION, SOLUTION INTRAVENOUS at 23:44

## 2022-06-29 RX ADMIN — FENTANYL CITRATE 50 MCG: 50 INJECTION, SOLUTION INTRAMUSCULAR; INTRAVENOUS at 13:28

## 2022-06-29 RX ADMIN — FENTANYL CITRATE 100 MCG: 50 INJECTION, SOLUTION INTRAMUSCULAR; INTRAVENOUS at 13:46

## 2022-06-29 RX ADMIN — FENTANYL CITRATE 50 MCG: 50 INJECTION, SOLUTION INTRAMUSCULAR; INTRAVENOUS at 16:20

## 2022-06-29 ASSESSMENT — PAIN DESCRIPTION - PAIN TYPE
TYPE: ACUTE PAIN

## 2022-06-29 ASSESSMENT — FIBROSIS 4 INDEX: FIB4 SCORE: 2.04

## 2022-06-29 ASSESSMENT — PAIN SCALES - GENERAL: PAIN_LEVEL: 3

## 2022-06-29 NOTE — OP REPORT
1. DATE OF SURGERY: 6/29/2022    2. SURGEON:  Karen Ceballos MD, PhD, ANDREW, Neurosurgeon    3. ASSISTANT:  Seamus Jeffers PA-C  An assistant was crucial to have during the case as the assistant helped with positioning, keeping the field clear of blood and retraction. This case could not be done easily without a qualified assistant. It was medically necessary    4. TYPE OF ANESTHESIA:  General anesthesia with endotracheal intubation    5. PREOPERATIVE DIAGNOSIS:  Lumbar spinal stenosis    1.  Multilevel lumbar degenerative disc disease     2.  Severe spinal stenosis L1-2, L2-3, L3-4, L4-5, L5-S1     3.  Progressive symptomatology with numbness in the crotch and difficulty voiding      6. POSTOPERATIVE DIAGNOSIS:  Lumbar spinal stenosis    1.  Multilevel lumbar degenerative disc disease     2.  Severe spinal stenosis L1-2, L2-3, L3-4, L4-5, L5-S1     3.  Progressive symptomatology with numbness in the crotch and difficulty voiding      7. HISTORY: See formal admission H and P    3/18/2022  This is a very nice 66-year-old man.  Is a  in [place].  I saw him 4 years ago.  At that time he had mainly back pain.  We managed him conservatively.  He saw a pain specialist.  Facet joint injections.  He took medications.     Progressively he started to develop symptoms which are claudicatory.  When he stands and walks he gets a feeling of weakness in the right leg.  More wiring he gets numbness in his testicles and scrotum.  He has difficulty voiding now and has to flex forward in order to pass urine.  He started to get bladder symptoms.  When he sitting he has no pain he feels okay when he stands and walks he gets this burning in the scrotum and the lower back and can get some burning in the right leg and a feeling weakness in the leg.  He can no longer walk a long distance.     He had epidurals in the past.     He is done physical therapy in the past     He is getting worse     From a general health point of view has  had a bypass.  He sees Dr. Palacios.     He is a .  Is .      8. PREOPERATIVE PHYSICAL EXAMINATION: See formal admission H and P    3/18/2022  When I examined him today no evidence of weakness.  Sensation was normal.     Last Imaging Result(s):         He had an MRI scan performed at [facility].  He had plain x-rays [facility].  He has a small subluxation stable at L4-5.  His MRI scan was done on 1/22/2022.  Is compared to the previous MRI scan.  He has a congenitally narrow canal.  He has severe spinal stenosis from L1 to down to L5-S1.      9. TITLE OF PROCEDURE:  • L1 Decompressive lumbar laminectomy with bilateral foraminotomies.  • L2 Decompressive lumbar laminectomy with bilateral foraminotomies.  • L3 Decompressive lumbar laminectomy with bilateral foraminotomies  • L4 Decompressive lumbar laminectomy with bilateral foraminotomies  • L5 Decompressive lumbar laminectomy with bilateral foraminotomies  • S1 Decompressive lumbar laminectomy with bilateral foraminotomies  • i/o On-Q Pain catheters in paraspinal musculature    10. OPERATIVE FINDINGS:  Lateral recess stenosis due to a combination of ligamentous hypertrophy and facet arthropathy. This was severe javan at L3/4. Marked stenosis throughout.     11. OPERATED LEVELS: as above    12. IMPLANTS USED: Nil    13. COMPLICATIONS:  Nil.    14. ESTIMATED BLOOD LOSS:      200   mL      15. OPERATIVE DETAILS:  After fully informed consent, the patient was brought to the operating room.  General anesthesia was administered.  The patient was given intravenous antibiotic.  The patient was then turned prone onto the OSI operating table  and Hadley frame.  All pressure points were padded.  Initial fluoroscopic localization was taken for skin marking.  The posterior lumbar region was prepped and draped in a standard fashion.      Using the initial x-ray as a guide, a midline skin incision was then affected.  This involved the  spinous processes at the affected levels where the laminectomies were to be done.  A bilateral subperiosteal exposure was affected.  Great care was taken not to violate the facet joints.      Once the exposure had been done, self-retaining retractors were placed.  Hemostasis was obtained.  A repeat lateral x-ray was then taken to confirm the level.  Laminectomy was affected. The laminectomy was affected with Leksell rongeurs initially, then I used an AM-8 drill bit under microscopic magnification and illumination to thin down the lamina.      Once the lamina was paper thin, I used combination of 4, 3, and 2 mm Kerrison punches to remove the remaining bone and ligamentum flavum.  I worked from a cranial to a caudal direction so that I was in the line of the nerve roots.  Initially, a central decompression was affected, then in an undercutting fashion, I removed the ligamentum flavum and lateral recesses with great care not to take too much of the facet joints.      The laminectomy involved the inferior 2/3 of L1, all of L2,3,4,5 and half of S1    Sequentially, I followed the L1,2,3,4,5, S1 nerve roots on both sides and a complete decompression was affected.      Hemostasis was obtained.  All the nerve roots were free.  I could pass a blunt dissector at each of the foramina with ease.  Hemostasis was obtained with a combination of Gelfoam and diathermy.  I then placed a vancomycin powder over the wound, but not on the dura.  A suction subfascial Hemovac was placed.       I placed On-Q pain catheters bilaterally in the paraspinal musculature through stab incisions.    I placed local anaesthetic throughout the paraspinal muscles.       The wound was then closed using multiple interrupted Vicryl sutures starting with 0 Vicryl sutures for the deep fascia, 2-0 Vicryl sutures for the sub-dermis and then staples for the skin.    Hemostasis was obtained.  A 2-0 vicryl was used for the drain.  Dermabond was placed over the  puncture sites of the On-Q catheters.  A dressing was applied.      All counts were correct at the end of the case and all instrumentation was accounted for.  The patient was then carefully turned supine again onto a regular operating table without incident.    At the end of case, the patient was moving both lower extremities well.    16 PROGNOSIS:  The surgery went well.  We plan to get the patient home in the next 24 to 48 hours. When the patient goes home, he/she will be discharged home on narcotic analgesia,  a muscle relaxant and oral antibiotic, usually Keflex.  The plan will be to follow up in 2 weeks in the office.  We will call the patient next week to ensure there are not any problems.  He/she can shower in 72 hours but is instructed to keep the wound dry.  The patient has also been instructed to abstain from smoking or any anti-inflammatories.     Karen Ceballos MD, PhD, ANDREW, Neurosurgeon      Cc: Meli Ramos M.D.  Shane Palacios MD

## 2022-06-29 NOTE — ANESTHESIA PREPROCEDURE EVALUATION
Case: [id number] Date/Time: 06/29/22 1345    Procedures:       L1/2, L2/3, L3/4, L4/5 and L5/S1 decompressive laminectomy and bilateral foraminotomies: (Bilateral )      LAMINECTOMY, SPINE, LUMBAR, WITH DISCECTOMY      FORAMINOTOMY, SPINE    Diagnosis: Neurogenic claudication due to lumbar spinal stenosis [M48.062]    Pre-op diagnosis: Neurogenic claudication due to lumbar spinal stenosis [M48.062]    Location: [place] [address] / SURGERY [facility]    Surgeons: Karen Ceballos M.D.          Relevant Problems   NEURO   (positive) History of chronic back pain      CARDIAC   (positive) Coronary artery disease involving native coronary artery of native heart without angina pectoris   (positive) Essential hypertension, benign   (positive) S/P CABG (coronary artery bypass graft)       Physical Exam    Airway   Mallampati: II  TM distance: >3 FB  Neck ROM: full       Cardiovascular - normal exam  Rhythm: regular  Rate: normal  (-) murmur     Dental - normal exam           Pulmonary - normal exam  Breath sounds clear to auscultation     Abdominal    Neurological - normal exam                 Anesthesia Plan    ASA 3       Plan - general       Airway plan will be ETT          Induction: intravenous    Postoperative Plan: Postoperative administration of opioids is intended.    Pertinent diagnostic labs and testing reviewed    Informed Consent:    Anesthetic plan and risks discussed with patient.    Use of blood products discussed with: patient whom consented to blood products.

## 2022-06-29 NOTE — ANESTHESIA POSTPROCEDURE EVALUATION
Patient: Santi Landers    Procedure Summary     Date: 06/29/22 Room / Location: [place] [address] / SURGERY [facility]    Anesthesia Start: 1328 Anesthesia Stop: 1552    Procedures:       DECOMPRESSION , SPINE, LUMBAR L1-L5, L-5-S1 (Bilateral Spine Lumbar)      LAMINECTOMY, SPINE, LUMBAR L1-L5 , L5-S1 (Bilateral Spine Lumbar)      FORAMINOTOMY, SPINE (Bilateral Spine Lumbar) Diagnosis:       Neurogenic claudication due to lumbar spinal stenosis      (Neurogenic claudication due to lumbar spinal stenosis [M48.062])    Surgeons: Karen Ceballos M.D. Responsible Provider: Michael Mendoza M.D.    Anesthesia Type: general ASA Status: 3          Final Anesthesia Type: general  Last vitals  BP   Blood Pressure : 131/75    Temp   36.3 °C (97.3 °F)    Pulse   68   Resp   18    SpO2   97 %      Anesthesia Post Evaluation    Patient location during evaluation: PACU  Patient participation: complete - patient participated  Level of consciousness: awake and alert  Pain score: 3    Airway patency: patent  Anesthetic complications: no  Cardiovascular status: adequate and hemodynamically stable  Respiratory status: acceptable  Hydration status: acceptable    PONV: none          No complications documented.

## 2022-06-29 NOTE — H&P
No change to my last H and P (that is labelled a progress note). The note was made by either myself, or my PAs Darrel Oh or Seamus Jeffers but is signed by me.If it was done by my physician assistant, I verify it is correct and has my co-signature.    Karen Ceballos MD PhD ANDREW

## 2022-06-29 NOTE — ANESTHESIA TIME REPORT
Anesthesia Start and Stop Event Times     Date Time Event    6/29/2022 1228 Ready for Procedure     1328 Anesthesia Start     1552 Anesthesia Stop        Responsible Staff  06/29/22    Name Role Begin End    Michael Mendoza M.D. Anesth 1328 1552        Overtime Reason:  no overtime (within assigned shift)    Comments:

## 2022-06-29 NOTE — ANESTHESIA PROCEDURE NOTES
Airway    Date/Time: 6/29/2022 1:36 PM  Performed by: Michael Mendoza M.D.  Authorized by: Michael Mendoza M.D.     Location:  OR  Urgency:  Elective  Indications for Airway Management:  Anesthesia      Spontaneous Ventilation: absent    Sedation Level:  Deep  Preoxygenated: Yes    Patient Position:  Sniffing  Final Airway Type:  Endotracheal airway  Final Endotracheal Airway:  ETT  Cuffed: Yes    Technique Used for Successful ETT Placement:  Direct laryngoscopy    Insertion Site:  Oral  Blade Type:  Cortez  Laryngoscope Blade/Videolaryngoscope Blade Size:  2  ETT Size (mm):  7.5  Measured from:  Teeth  ETT to Teeth (cm):  23  Placement Verified by: auscultation and capnometry    Cormack-Lehane Classification:  Grade I - full view of glottis  Number of Attempts at Approach:  1

## 2022-06-29 NOTE — LETTER
March 22, 2022    Patient Name: Santi Landers  Surgeon Name: Karen Ceballos M.D.  [facility]gery [facility] ([facility])  Surgery Date: 6/22/2022    The time of your surgery is not final and may change up to and until the day of your surgery. You will be contacted 1-2 business days prior to your surgery date with your check-in and surgery time.    BEFORE YOUR SURGERY    Pre Registration and/or Lab Work/Tests must be done within 7 to 28 days before your surgery date. These will be completed at [facility] with an appointment.    On 6/10/22 - if you have not already heard from [facility] Pre Admission/Registration Department, please call them at [phone] option 2, then option 1, for an appointment.      Please inform Isabella the date of this appointment at, [phone] or by email, [email or web address]    *COVID-19 TESTS are now MANDATORY and must be completed 4-7 days prior to your surgery date.     All facilities have testing capabilities and can provide the test, or you can have one completed at your local pharmacy. At-Home tests are NOT ACCEPTED.    Pre op Appointment:   Date: 6/8/22   Time: 11:30am   Provider: BETH Hernandez   Location: [address] [place] [address]    Bring a list of all medications you are currently taking including the dosing and frequency.    Please read the MEDICATION INSTRUCTIONS below completely.    DAY OF YOUR SURGERY    Nothing to eat or drink and refrain from smoking any substance after midnight the day of your surgery. Smoking may interfere with the anesthetic and frequently produces nausea during the recovery period.    Continue taking all lifesaving medications, including the morning of your surgery with small sip of water.    Please arrive at the hospital/surgery center at the check-in time provided.     You will need a responsible adult to drive you to and from your surgery.    AFTER YOUR SURGERY    2 Week Post op Appointment:   Date: 7/8/22   Time:  11:30am  With: BETH Hernandez  Location: [address] [place] [address]    6 Week Post op Appointment:   Date: 8/4/22   Time: 11:30am   With: BETH Hernandez  Location: [address] [place] [address]    MEDICATION INSTRUCTIONS Do not take these medications prior to your procedure:  • Anti-inflammatories: stop 7 days prior, restart when advised. For fusions avoid for 12 weeks after surgery  o Naproxen (Naprosyn or Alleve)  o Motrin  o Ibuprofen  o Nabumetone (Relafen)  o Meloxicam (Mobic)  o Celebrex  o Salsalate  o Diclofenac (Arthrotec, Voltaren, Flector)  o Sulindac (Clinoril  o Etodolac (Lodine)  o Indomethacin (Indocin)  o Ketoprofen  o Ketorolac  o Oxaprozin (Daypro)  o Piroxicam (Feldene  o Blood thinners (stop after approval from the prescribing physician)  • Aspirin (any dosage): 10 days prior, restart 7 days after procedure  o Any medications that contain aspirin in combination (ie: Excedrin migraine, Fiorinal, and Norgesic)  • Warfarin (Coumadin): Stop 7 days prior, INR day of procedure, restart 2-3 weeks after procedure  • Antiplatelet: restart 7 days after procedure  o Ticlid (ticlopidine): Stop 14 days prior  o  Plavix (clopidogrel): Stop 14 days prior  o Aggrenox or Dipyridamole: Stop 14 days prior  o ReoPro (abciximab): Stop 14 days prior  o Integrilin (eptifibatide): Stop 14 days prior  • Aggrastat (tirofiban): Stop 14 days prior  • Lovenox (Enoxaparin): Stop 24hrs before and restart 24hrs after procedure  • Heparin: Stop 24hrs before   • Dalteparin (Fragmin): Stop 24hrs before  • Fondaparinux (Arixtra): Stop 24hrs before  • Xeralto, Dabigatran (Pradaxa) Stop 7 days prior  • Eliquis (apibaxan) Stop 7 days prior  Okay to take these medications as prescribed:  • Muscle relaxers  • Acetaminophen and pain medications that have it in addition to oxycodone and hydrocodone  • Blood pressure, cholesterol and diabetes medications are ok    TIME OFF WORK    FMLA or Disability forms can be faxed directly to [phone]  or you may drop them off at any [facility]. Our office charges a $35.00 fee. Forms will be completed within 5-10 business days after payment is received. For the status of your forms you may contact our disability office directly at [phone].    DENTAL PROCDURES/CLEANINGS Avoid 3 weeks before surgery and for 3 months after surgery.    QUESTIONS ABOUT COSTS  Contact our Patient Financial Services department at [phone] for more information.    If you have any questions, please contact our office.    [facility]  [address] [id number] [phone]      Isabella Luis   Surgery Scheduler  ? [phone]   Fax: [phone]  [address]  [address]  [place] [address]  [phone]

## 2022-06-29 NOTE — OR SURGEON
Immediate Post OP Note    PreOp Diagnosis:     1.  Multilevel lumbar degenerative disc disease     2.  Severe spinal stenosis L1-2, L2-3, L3-4, L4-5, L5-S1     3.  Progressive symptomatology with numbness in the crotch and difficulty voiding      PostOp Diagnosis:     1.  Multilevel lumbar degenerative disc disease     2.  Severe spinal stenosis L1-2, L2-3, L3-4, L4-5, L5-S1     3.  Progressive symptomatology with numbness in the crotch and difficulty voiding      Procedure(s):  DECOMPRESSION , SPINE, LUMBAR L1-L5, L-5-S1 - Wound Class: Clean with Drain  LAMINECTOMY, SPINE, LUMBAR L1-L5 , L5-S1 - Wound Class: Clean with Drain  FORAMINOTOMY, SPINE - Wound Class: Clean with Drain    Surgeon(s):  Karen Ceballos M.D.    Anesthesiologist/Type of Anesthesia:  Anesthesiologist: Michael Mendoza M.D./General    Surgical Staff:  Assistant: Seamus Jeffers P.A.-C.  Circulator: Carolina Ulloa RMELVIN; Amber Carranza RMELVIN  Scrub Person: Nanette Parish  Radiology Technologist: Kristin Aparicio    Specimens removed if any:  * No specimens in log *    Assistants:Seamus Jeffers PA-C  ,  Specimen: nil    Estimated Blood Loss: 200 cc    Findings: n/a    Complications: nil        6/29/2022 3:40 PM Karen Ceballos M.D.

## 2022-06-29 NOTE — LETTER
April 7, 2022    Patient Name: Santi Landers  Surgeon Name: Karen Ceballos M.D.  Surgery Facility: [facility] ([facility])  Surgery Date: 6/29/2022        The time of your surgery is not final and may change up to and until the day of your surgery. You will be contacted 1-2 business days prior to your surgery date with your check-in and surgery time.    BEFORE YOUR SURGERY    Pre Registration and/or Lab Work/Tests must be done within 7 to 28 days before your surgery date. These will be completed at [facility] with an appointment.    On May 25th - if you have not already heard from [facility] Pre Admission/Registration Department, please call them at [phone] option 2, then option 1, for an appointment.      Please inform Isabella the date of this appointment at, [phone] or by email, [email or web address].    Pre op Appointment:   Date: 6.23.22   Time: 11:30am   Provider: Seamus Jeffers PA-C   Location: [address] [place] [address]    Bring a list of all medications you are currently taking including the dosing and frequency.    Please read the MEDICATION INSTRUCTIONS below completely.    DAY OF YOUR SURGERY    Nothing to eat or drink and refrain from smoking any substance after midnight the day of your surgery. Smoking may interfere with the anesthetic and frequently produces nausea during the recovery period.    Continue taking all lifesaving medications, including the morning of your surgery with small sip of water.    Please arrive at the hospital/surgery center at the check-in time provided.     You will need a responsible adult to drive you to and from your surgery.    AFTER YOUR SURGERY    2 Week Post op Appointment:   Date: 7.13.22   Time: 11:30am   Provider: Seamus Jeffers PA-C   Location: [address] [place], [place] [address]    6 Week Post op Appointment:   Date: 8.11.22   Time: 11:30am   Provider: Seamus Jeffers PA-C   Location: [address] [place], [place] [address]    MEDICATION INSTRUCTIONS Do  not take these medications prior to your procedure:  • Anti-inflammatories: stop 7 days prior, restart when advised. For fusions avoid for 12 weeks after surgery  o Naproxen (Naprosyn or Alleve)  o Motrin  o Ibuprofen  o Nabumetone (Relafen)  o Meloxicam (Mobic)  o Celebrex  o Salsalate  o Diclofenac (Arthrotec, Voltaren, Flector)  o Sulindac (Clinoril  o Etodolac (Lodine)  o Indomethacin (Indocin)  o Ketoprofen  o Ketorolac  o Oxaprozin (Daypro)  o Piroxicam (Feldene  o Blood thinners (stop after approval from the prescribing physician)  • Aspirin (any dosage): 10 days prior, restart 7 days after procedure  o Any medications that contain aspirin in combination (ie: Excedrin migraine, Fiorinal, and Norgesic)  • Warfarin (Coumadin): Stop 7 days prior, INR day of procedure, restart 2-3 weeks after procedure  • Antiplatelet: restart 7 days after procedure  o Ticlid (ticlopidine): Stop 14 days prior  o  Plavix (clopidogrel): Stop 14 days prior  o Aggrenox or Dipyridamole: Stop 14 days prior  o ReoPro (abciximab): Stop 14 days prior  o Integrilin (eptifibatide): Stop 14 days prior  • Aggrastat (tirofiban): Stop 14 days prior  • Lovenox (Enoxaparin): Stop 24hrs before and restart 24hrs after procedure  • Heparin: Stop 24hrs before   • Dalteparin (Fragmin): Stop 24hrs before  • Fondaparinux (Arixtra): Stop 24hrs before  • Xeralto, Dabigatran (Pradaxa) Stop 7 days prior  • Eliquis (apibaxan) Stop 7 days prior  Okay to take these medications as prescribed:  • Muscle relaxers  • Acetaminophen and pain medications that have it in addition to oxycodone and hydrocodone  • Blood pressure, cholesterol and diabetes medications are ok    TIME OFF WORK    FMLA or Disability forms can be faxed directly to [phone] or you may drop them off at any [facility]. Our office charges a $35.00 fee. Forms will be completed within 5-10 business days after payment is received. For the status of your forms you may contact our  disability office directly at [phone].    DENTAL PROCDURES/CLEANINGS Avoid 3 weeks before surgery and for 3 months after surgery.    QUESTIONS ABOUT COSTS  Contact our Patient Financial Services department at [phone] for more information.    If you have any questions, please contact our office.    [facility]  [address] [address] [phone]      Isabella Luis   Surgery Scheduler  ? [phone]   Fax: [phone]  [address]  [address]  [place] [address] [phone]

## 2022-06-30 LAB
ANION GAP SERPL CALC-SCNC: 11 MMOL/L (ref 7–16)
APTT PPP: 26 SEC (ref 24.7–36)
BUN SERPL-MCNC: 23 MG/DL (ref 8–22)
CALCIUM SERPL-MCNC: 8.3 MG/DL (ref 8.5–10.5)
CFT BLD TEG: 6.3 MIN (ref 4.6–9.1)
CFT P HPASE BLD TEG: 5.8 MIN (ref 4.3–8.3)
CHLORIDE SERPL-SCNC: 97 MMOL/L (ref 96–112)
CLOT ANGLE BLD TEG: 72.9 DEGREES (ref 63–78)
CLOT LYSIS 30M P MA LENFR BLD TEG: 0.2 % (ref 0–2.6)
CO2 SERPL-SCNC: 26 MMOL/L (ref 20–33)
CREAT SERPL-MCNC: 0.87 MG/DL (ref 0.5–1.4)
CT.EXTRINSIC BLD ROTEM: 1.3 MIN (ref 0.8–2.1)
ERYTHROCYTE [DISTWIDTH] IN BLOOD BY AUTOMATED COUNT: 43.4 FL (ref 35.9–50)
GFR SERPLBLD CREATININE-BSD FMLA CKD-EPI: 94 ML/MIN/1.73 M 2
GLUCOSE SERPL-MCNC: 148 MG/DL (ref 65–99)
HCT VFR BLD AUTO: 35.6 % (ref 42–52)
HGB BLD-MCNC: 12.9 G/DL (ref 14–18)
INR PPP: 1.17 (ref 0.87–1.13)
MCF BLD TEG: 60.3 MM (ref 52–69)
MCF.PLATELET INHIB BLD ROTEM: 17.3 MM (ref 15–32)
MCH RBC QN AUTO: 35.9 PG (ref 27–33)
MCHC RBC AUTO-ENTMCNC: 36.2 G/DL (ref 33.7–35.3)
MCV RBC AUTO: 99.2 FL (ref 81.4–97.8)
PA AA BLD-ACNC: 92.9 % (ref 0–11)
PA ADP BLD-ACNC: 59.2 % (ref 0–17)
PLATELET # BLD AUTO: 145 K/UL (ref 164–446)
PMV BLD AUTO: 8.9 FL (ref 9–12.9)
POTASSIUM SERPL-SCNC: 4.6 MMOL/L (ref 3.6–5.5)
PROTHROMBIN TIME: 14.5 SEC (ref 12–14.6)
RBC # BLD AUTO: 3.59 M/UL (ref 4.7–6.1)
SODIUM SERPL-SCNC: 134 MMOL/L (ref 135–145)
TEG ALGORITHM TGALG: ABNORMAL
WBC # BLD AUTO: 8 K/UL (ref 4.8–10.8)

## 2022-06-30 PROCEDURE — 85027 COMPLETE CBC AUTOMATED: CPT

## 2022-06-30 PROCEDURE — 97535 SELF CARE MNGMENT TRAINING: CPT

## 2022-06-30 PROCEDURE — 97161 PT EVAL LOW COMPLEX 20 MIN: CPT

## 2022-06-30 PROCEDURE — 85384 FIBRINOGEN ACTIVITY: CPT

## 2022-06-30 PROCEDURE — 85347 COAGULATION TIME ACTIVATED: CPT

## 2022-06-30 PROCEDURE — 85730 THROMBOPLASTIN TIME PARTIAL: CPT

## 2022-06-30 PROCEDURE — 80048 BASIC METABOLIC PNL TOTAL CA: CPT

## 2022-06-30 PROCEDURE — 85610 PROTHROMBIN TIME: CPT

## 2022-06-30 PROCEDURE — 36415 COLL VENOUS BLD VENIPUNCTURE: CPT

## 2022-06-30 PROCEDURE — G0378 HOSPITAL OBSERVATION PER HR: HCPCS

## 2022-06-30 PROCEDURE — 700102 HCHG RX REV CODE 250 W/ 637 OVERRIDE(OP): Performed by: PHYSICIAN ASSISTANT

## 2022-06-30 PROCEDURE — 700111 HCHG RX REV CODE 636 W/ 250 OVERRIDE (IP): Performed by: PHYSICIAN ASSISTANT

## 2022-06-30 PROCEDURE — A9270 NON-COVERED ITEM OR SERVICE: HCPCS | Performed by: PHYSICIAN ASSISTANT

## 2022-06-30 PROCEDURE — 700111 HCHG RX REV CODE 636 W/ 250 OVERRIDE (IP): Mod: JG | Performed by: NEUROLOGICAL SURGERY

## 2022-06-30 PROCEDURE — 85576 BLOOD PLATELET AGGREGATION: CPT

## 2022-06-30 PROCEDURE — 99024 POSTOP FOLLOW-UP VISIT: CPT | Performed by: PHYSICIAN ASSISTANT

## 2022-06-30 RX ORDER — OXYCODONE HYDROCHLORIDE 10 MG/1
10 TABLET ORAL EVERY 4 HOURS PRN
Status: DISCONTINUED | OUTPATIENT
Start: 2022-06-30 | End: 2022-07-01 | Stop reason: HOSPADM

## 2022-06-30 RX ORDER — MORPHINE SULFATE 4 MG/ML
1 INJECTION INTRAVENOUS
Status: DISCONTINUED | OUTPATIENT
Start: 2022-06-30 | End: 2022-07-01 | Stop reason: HOSPADM

## 2022-06-30 RX ORDER — OXYCODONE HYDROCHLORIDE 5 MG/1
5 TABLET ORAL EVERY 4 HOURS PRN
Status: DISCONTINUED | OUTPATIENT
Start: 2022-06-30 | End: 2022-07-01 | Stop reason: HOSPADM

## 2022-06-30 RX ADMIN — METOPROLOL TARTRATE 50 MG: 50 TABLET, FILM COATED ORAL at 22:03

## 2022-06-30 RX ADMIN — GABAPENTIN 900 MG: 300 CAPSULE ORAL at 13:01

## 2022-06-30 RX ADMIN — POLYETHYLENE GLYCOL 3350 1 PACKET: 17 POWDER, FOR SOLUTION ORAL at 16:09

## 2022-06-30 RX ADMIN — OXYCODONE 5 MG: 5 TABLET ORAL at 22:53

## 2022-06-30 RX ADMIN — GABAPENTIN 900 MG: 300 CAPSULE ORAL at 05:31

## 2022-06-30 RX ADMIN — ACETAMINOPHEN 1000 MG: 500 TABLET ORAL at 13:01

## 2022-06-30 RX ADMIN — OMEPRAZOLE 20 MG: 20 CAPSULE, DELAYED RELEASE ORAL at 05:32

## 2022-06-30 RX ADMIN — SENNOSIDES AND DOCUSATE SODIUM 1 TABLET: 50; 8.6 TABLET ORAL at 22:03

## 2022-06-30 RX ADMIN — DOCUSATE SODIUM 100 MG: 100 CAPSULE, LIQUID FILLED ORAL at 05:32

## 2022-06-30 RX ADMIN — TIZANIDINE 4 MG: 4 TABLET ORAL at 16:09

## 2022-06-30 RX ADMIN — METOPROLOL TARTRATE 50 MG: 50 TABLET, FILM COATED ORAL at 09:38

## 2022-06-30 RX ADMIN — CEFAZOLIN SODIUM 2 G: 2 INJECTION, SOLUTION INTRAVENOUS at 05:31

## 2022-06-30 RX ADMIN — ALTEPLASE 2 MG: 2.2 INJECTION, POWDER, LYOPHILIZED, FOR SOLUTION INTRAVENOUS at 09:07

## 2022-06-30 RX ADMIN — DOCUSATE SODIUM 100 MG: 100 CAPSULE, LIQUID FILLED ORAL at 18:27

## 2022-06-30 RX ADMIN — ACETAMINOPHEN 1000 MG: 500 TABLET ORAL at 05:32

## 2022-06-30 RX ADMIN — AMLODIPINE BESYLATE 5 MG: 5 TABLET ORAL at 18:26

## 2022-06-30 RX ADMIN — ACETAMINOPHEN 1000 MG: 500 TABLET ORAL at 18:27

## 2022-06-30 RX ADMIN — GABAPENTIN 900 MG: 300 CAPSULE ORAL at 18:26

## 2022-06-30 ASSESSMENT — COGNITIVE AND FUNCTIONAL STATUS - GENERAL
DAILY ACTIVITIY SCORE: 23
DRESSING REGULAR LOWER BODY CLOTHING: A LITTLE
CLIMB 3 TO 5 STEPS WITH RAILING: A LITTLE
SUGGESTED CMS G CODE MODIFIER MOBILITY: CI
CLIMB 3 TO 5 STEPS WITH RAILING: A LITTLE
MOBILITY SCORE: 23
MOBILITY SCORE: 23
SUGGESTED CMS G CODE MODIFIER DAILY ACTIVITY: CI
SUGGESTED CMS G CODE MODIFIER MOBILITY: CI

## 2022-06-30 ASSESSMENT — PATIENT HEALTH QUESTIONNAIRE - PHQ9
2. FEELING DOWN, DEPRESSED, IRRITABLE, OR HOPELESS: NOT AT ALL
1. LITTLE INTEREST OR PLEASURE IN DOING THINGS: NOT AT ALL
SUM OF ALL RESPONSES TO PHQ9 QUESTIONS 1 AND 2: 0

## 2022-06-30 ASSESSMENT — GAIT ASSESSMENTS
DEVIATION: NO DEVIATION
GAIT LEVEL OF ASSIST: SUPERVISED
DISTANCE (FEET): 400

## 2022-06-30 ASSESSMENT — PAIN DESCRIPTION - PAIN TYPE
TYPE: SURGICAL PAIN
TYPE: SURGICAL PAIN

## 2022-06-30 NOTE — PROGRESS NOTES
4 Eyes Skin Assessment Completed by JESUS Burns and JESUS Gustafson.    Head WDL  Ears WDL  Nose WDL  Mouth WDL  Neck WDL  Breast/Chest WDL  Shoulder Blades WDL  Spine Incision with HVAC and on Q pump in place  (R) Arm/Elbow/Hand WDL  (L) Arm/Elbow/Hand WDL  Abdomen WDL  Groin WDL  Scrotum/Coccyx/Buttocks WDL  (R) Leg WDL  (L) Leg WDL  (R) Heel/Foot/Toe WDL  (L) Heel/Foot/Toe WDL          Devices In Places Pulse Ox, SCD's and Nasal Cannula      Interventions In Place Pillows and Pressure Redistribution Mattress    Possible Skin Injury No    Pictures Uploaded Into Epic N/A  Wound Consult Placed N/A  RN Wound Prevention Protocol Ordered No

## 2022-06-30 NOTE — PROGRESS NOTES
Pt arrived to unit from PACU. Pt pivot transferred to hospital bed. Pt AAOx4.. VSS on 2L O2, in no apparent distress. Pt denies chest pain. Assessment completed. Surgical dressing to back CDI. Pt oriented to room and to call light. Discussed plan of care. Bed locked and in lowest position. Call light and belongings within reach.

## 2022-06-30 NOTE — OR NURSING
"1745: Pt complaining of increasing pain in BLE,  worse in right. Pain is described as numbness from buttocks to his heels and \"feels like decreased circulation\". Hemovac drain compressed. No drainage present in collection device. Blood in tubing. On Q pump infusing.   Pt turned to his left side to assess incision. No swelling noted. Drsg CDI.   Pt requested to stay on his left side.   After 15-20 mins pt reports pain dramatically better and states the numbness has resolved.     BETH Hernandez notified of above. OnQ pump stopped x1 hour. After symptoms resolved, order given to restart pump and notify MD if weakness, heaviness or numbness worsen.   "

## 2022-06-30 NOTE — DISCHARGE PLANNING
Case Management Discharge Planning    Admission Date: 6/29/2022  GMLOS:    ALOS: 0    6-Clicks ADL Score: 23  6-Clicks Mobility Score: 23      Anticipated Discharge Dispo: Discharge Disposition: Discharged to home/self care (01)  Discharge Address: [id number] [place] [address]  Discharge Contact Phone Number: [phone]    DME Needed: No    Action(s) Taken: Updated Provider/Nurse on Discharge Plan and OTHER: discussed patients treatment and medical plans with medical, nursing, and therapy teams during IDT rounds.    Escalations Completed: None    Medically Clear: No    Next Steps: f/u with medical, nursing, and therapy regarding discharge plans and barriers, assist as needed.    Barriers to Discharge: None    Is the patient up for discharge tomorrow: No, patient is likely to discharge tomorrow to home.

## 2022-06-30 NOTE — THERAPY
"Occupational Therapy Contact Note    Attempted OT eval, pt reports he \"overdid it\" and needs to rest. Per pt, they are keeping him overnight. Will see in the AM for OT kevyn.    Sena Light OTR/L  "

## 2022-06-30 NOTE — OR NURSING
VSS, on 2L via NC. AAOx4. Surgical dressing intact w/ hemovac and on-Q pump. Patient resting comfortably. All needs met. Patient transported to [facility]. O2 tank 1/2 full.

## 2022-06-30 NOTE — THERAPY
"Physical Therapy   Initial Evaluation     Patient Name: Santi Landers  Age:  66 y.o., Sex:  male  Medical Record #: [id number]  Today's Date: 6/30/2022     Precautions  Precautions: Spinal / Back Precautions   Comments: No brace    Assessment  Patient is a 66 y.o. male who was admitted s/p L1-S1 decompression. PMH significant for HTN, CAD s/p CABG in 2020, dyslipidemia, chronic back pain. Pt received in bed and agreeable to PT evaluation. Pt provided with post-op lumbar handout and educated on precautions as well as log roll technique. Pt provided with further education on recommendation for walking as primary source of exercise until cleared by surgeon to resume outpatient PT. Pt was able to demo bed mobility, transfers, and ambulation with supervision and no assistive devices. Pt is not in need of further acute PT.      Plan    Recommend Physical Therapy for Evaluation only.    DC Equipment Recommendations: None  Discharge Recommendations: Recommend outpatient physical therapy services to address higher level deficits       Subjective    \"This is way further than I could walk before the surgery\"     Objective       06/30/22 0826   Initial Contact Note    Initial Contact Note Order Received and Verified, Evaluation Only - Patient Does Not Require Further Acute Physical Therapy at this Time.  However, May Benefit from Post Acute Therapy for Higher Level Functional Deficits.   Precautions   Precautions Spinal / Back Precautions    Comments No brace   Pain 0 - 10 Group   Therapist Pain Assessment Post Activity Pain Same as Prior to Activity;Nurse Notified  (no c/o pain during session)   Prior Living Situation   Prior Services None   Housing / Facility 1 Story House   Steps Into Home 0   Steps In Home 0   Equipment Owned None   Lives with - Patient's Self Care Capacity Spouse   Comments Pt reports his wife is able to assist him if needed.   Prior Level of Functional Mobility   Comments Independent PTA   History of " Falls   History of Falls No   Cognition    Level of Consciousness Alert   Comments Pleasant and cooperative, receptive to education   Passive ROM Lower Body   Passive ROM Lower Body WDL   Active ROM Lower Body    Active ROM Lower Body  WDL   Strength Lower Body   Lower Body Strength  WDL   Sensation Lower Body   Comments Denies LE symptoms or numbness   Lower Body Muscle Tone   Lower Body Muscle Tone  WDL   Coordination Lower Body    Coordination Lower Body  WDL   Balance Assessment   Sitting Balance (Static) Good   Sitting Balance (Dynamic) Good   Standing Balance (Static) Fair +   Standing Balance (Dynamic) Fair +   Weight Shift Sitting Good   Weight Shift Standing Good   Comments No AD in standing, no losses of balance or unsteadiness   Gait Analysis   Gait Level Of Assist Supervised   Assistive Device None   Distance (Feet) 400   # of Times Distance was Traveled 1   Deviation No deviation   Bed Mobility    Supine to Sit Supervised   Scooting Supervised   Rolling Supervised   Comments Via log roll technique   Functional Mobility   Sit to Stand Supervised   Bed, Chair, Wheelchair Transfer Supervised   Toilet Transfers Supervised   Transfer Method Stand Step   How much difficulty does the patient currently have...   Turning over in bed (including adjusting bedclothes, sheets and blankets)? 4   Sitting down on and standing up from a chair with arms (e.g., wheelchair, bedside commode, etc.) 4   Moving from lying on back to sitting on the side of the bed? 4   How much help from another person does the patient currently need...   Moving to and from a bed to a chair (including a wheelchair)? 4   Need to walk in a hospital room? 4   Climbing 3-5 steps with a railing? 3   6 clicks Mobility Score 23   Education Group   Education Provided Spine Precautions;Role of Physical Therapist   Spine Precautions Patient Response Patient;Acceptance;Explanation;Handout;Verbal Demonstration   Role of Physical Therapist Patient Response  Patient;Acceptance;Explanation;Verbal Demonstration   Anticipated Discharge Equipment and Recommendations   DC Equipment Recommendations None   Discharge Recommendations Recommend outpatient physical therapy services to address higher level deficits   Interdisciplinary Plan of Care Collaboration   IDT Collaboration with  Nursing   Patient Position at End of Therapy Seated;Call Light within Reach;Tray Table within Reach;Phone within Reach   Collaboration Comments RN updated   Session Information   Date / Session Number  6/30- eval only

## 2022-06-30 NOTE — OR NURSING
Pt sitting up at edge of [facility] attempting to void. Unsuccessful at this time. Bladder scan showed 390ml. Pt would like more time to try to void.     Report called to JESUS Burns.     Pt's wife called and message left with pt's room number.

## 2022-06-30 NOTE — CARE PLAN
Problem: Pain - Standard  Goal: Alleviation of pain or a reduction in pain to the patient’s comfort goal  Outcome: Progressing  Note: Pain managed with medications and rest, PCA available     Problem: Knowledge Deficit - Standard  Goal: Patient and family/care givers will demonstrate understanding of plan of care, disease process/condition, diagnostic tests and medications  Outcome: Progressing  Note: POC discussed with pt-pt verbalized understanding   The patient is Watcher - Medium risk of patient condition declining or worsening    Shift Goals  Clinical Goals: pain control, mobility  Patient Goals: rest, pain control    Progress made toward(s) clinical / shift goals:      Patient is not progressing towards the following goals:

## 2022-06-30 NOTE — PROGRESS NOTES
"Neurosurgery  POD# 1  Seen with Dr. Ceballos  Ambulating  Voiding  Tolerating oral medications  Denies nausea, vomiting  Pain controlled on current medication regimen  Leg symptoms improved  Saddle numbness relieved    Objective:  BP (!) 97/57   Pulse 70   Temp 36.4 °C (97.5 °F) (Temporal)   Resp 17   Ht 1.753 m (5' 9\")   Wt 96.8 kg (213 lb 6.5 oz)   SpO2 97%     Intake/Output Summary (Last 24 hours) at 6/30/2022 0715  Last data filed at 6/30/2022 0400  Gross per 24 hour   Intake 2690 ml   Output 0 ml   Net 2690 ml       Recent Labs     06/30/22  0237   WBC 8.0   RBC 3.59*   HEMOGLOBIN 12.9*   HEMATOCRIT 35.6*   MCV 99.2*   MCH 35.9*   MCHC 36.2*   RDW 43.4   PLATELETCT 145*   MPV 8.9*     Recent Labs     06/30/22  0237   SODIUM 134*   POTASSIUM 4.6   CHLORIDE 97   CO2 26   GLUCOSE 148*   BUN 23*     Recent Labs     06/30/22  0237   APTT 26.0   INR 1.17*     Mild hypotension this morning, otherwise VSS  Plt 145, Hgb 12.9, Na 134, all low normal, will continue to watch, otherwise LS  Hemovac with 0cc/8hr am- we changed distal tubing and vacuum canister without improvement, there is clot in the proximal tubing  Surgical incision clean, dry, intact, no evidence of infection, mild drainage at inferior portion of the wound  Strength:  Lower extremities are 5/5 grossly  Otherwise neurologically intact    Assessment:  Active Hospital Problems    Diagnosis    • Lumbar stenosis with neurogenic claudication [M48.062]    • Neurogenic claudication due to lumbar spinal stenosis [M48.062]      Added automatically from request for surgery [id number]       POD#1 S/p L1-S1 laminectomy  Chemoprophylaxis: No    Plan:  1. Ambulate with PT/OT as tolerated  2. Advance diet as tolerated  3. Flush 1 mg alteplase in proximal tubing, wait 30 minutes, if clot remains, flush additional 30 mg alteplase. Continue hemovac to suction when it begins draining  4. Continue on-q pump open  5. Aim for home Friday if stable  6. D/c PCA, Advance orals  "

## 2022-07-01 ENCOUNTER — PHARMACY VISIT (OUTPATIENT)
Dept: PHARMACY | Facility: MEDICAL CENTER | Age: 67
End: 2022-07-01
Payer: MEDICARE

## 2022-07-01 VITALS
DIASTOLIC BLOOD PRESSURE: 69 MMHG | OXYGEN SATURATION: 99 % | HEIGHT: 69 IN | RESPIRATION RATE: 17 BRPM | WEIGHT: 213.41 LBS | BODY MASS INDEX: 31.61 KG/M2 | HEART RATE: 62 BPM | SYSTOLIC BLOOD PRESSURE: 122 MMHG | TEMPERATURE: 98.2 F

## 2022-07-01 LAB
ANION GAP SERPL CALC-SCNC: 12 MMOL/L (ref 7–16)
APTT PPP: 25.2 SEC (ref 24.7–36)
BUN SERPL-MCNC: 16 MG/DL (ref 8–22)
CALCIUM SERPL-MCNC: 8 MG/DL (ref 8.5–10.5)
CHLORIDE SERPL-SCNC: 97 MMOL/L (ref 96–112)
CO2 SERPL-SCNC: 22 MMOL/L (ref 20–33)
CREAT SERPL-MCNC: 0.66 MG/DL (ref 0.5–1.4)
ERYTHROCYTE [DISTWIDTH] IN BLOOD BY AUTOMATED COUNT: 43.7 FL (ref 35.9–50)
GFR SERPLBLD CREATININE-BSD FMLA CKD-EPI: 102 ML/MIN/1.73 M 2
GLUCOSE SERPL-MCNC: 133 MG/DL (ref 65–99)
HCT VFR BLD AUTO: 31.1 % (ref 42–52)
HGB BLD-MCNC: 11.3 G/DL (ref 14–18)
INR PPP: 1.05 (ref 0.87–1.13)
MCH RBC QN AUTO: 35.8 PG (ref 27–33)
MCHC RBC AUTO-ENTMCNC: 36.3 G/DL (ref 33.7–35.3)
MCV RBC AUTO: 98.4 FL (ref 81.4–97.8)
PLATELET # BLD AUTO: 125 K/UL (ref 164–446)
PMV BLD AUTO: 9.6 FL (ref 9–12.9)
POTASSIUM SERPL-SCNC: 3.6 MMOL/L (ref 3.6–5.5)
PROTHROMBIN TIME: 13.5 SEC (ref 12–14.6)
RBC # BLD AUTO: 3.16 M/UL (ref 4.7–6.1)
SODIUM SERPL-SCNC: 131 MMOL/L (ref 135–145)
WBC # BLD AUTO: 8.8 K/UL (ref 4.8–10.8)

## 2022-07-01 PROCEDURE — G0378 HOSPITAL OBSERVATION PER HR: HCPCS

## 2022-07-01 PROCEDURE — 85730 THROMBOPLASTIN TIME PARTIAL: CPT

## 2022-07-01 PROCEDURE — 97165 OT EVAL LOW COMPLEX 30 MIN: CPT

## 2022-07-01 PROCEDURE — 85027 COMPLETE CBC AUTOMATED: CPT

## 2022-07-01 PROCEDURE — 85610 PROTHROMBIN TIME: CPT

## 2022-07-01 PROCEDURE — 36415 COLL VENOUS BLD VENIPUNCTURE: CPT

## 2022-07-01 PROCEDURE — 700102 HCHG RX REV CODE 250 W/ 637 OVERRIDE(OP): Performed by: PHYSICIAN ASSISTANT

## 2022-07-01 PROCEDURE — A9270 NON-COVERED ITEM OR SERVICE: HCPCS | Performed by: PHYSICIAN ASSISTANT

## 2022-07-01 PROCEDURE — 99024 POSTOP FOLLOW-UP VISIT: CPT | Performed by: PHYSICIAN ASSISTANT

## 2022-07-01 PROCEDURE — 97535 SELF CARE MNGMENT TRAINING: CPT

## 2022-07-01 PROCEDURE — RXMED WILLOW AMBULATORY MEDICATION CHARGE: Performed by: PHYSICIAN ASSISTANT

## 2022-07-01 PROCEDURE — 80048 BASIC METABOLIC PNL TOTAL CA: CPT

## 2022-07-01 RX ORDER — TIZANIDINE 2 MG/1
2-4 TABLET ORAL 3 TIMES DAILY PRN
Qty: 45 TABLET | Refills: 0 | Status: SHIPPED | OUTPATIENT
Start: 2022-07-01

## 2022-07-01 RX ORDER — GABAPENTIN 300 MG/1
900 CAPSULE ORAL 3 TIMES DAILY
Qty: 90 CAPSULE | Refills: 0 | Status: SHIPPED | OUTPATIENT
Start: 2022-07-01 | End: 2022-07-15 | Stop reason: SDUPTHER

## 2022-07-01 RX ORDER — CEPHALEXIN 500 MG/1
500 CAPSULE ORAL 4 TIMES DAILY
Qty: 20 CAPSULE | Refills: 0 | Status: SHIPPED | OUTPATIENT
Start: 2022-07-01 | End: 2022-10-06

## 2022-07-01 RX ORDER — OXYCODONE HYDROCHLORIDE 5 MG/1
5 TABLET ORAL EVERY 4 HOURS PRN
Qty: 36 TABLET | Refills: 0 | Status: SHIPPED | OUTPATIENT
Start: 2022-07-01 | End: 2022-07-07

## 2022-07-01 RX ADMIN — GABAPENTIN 900 MG: 300 CAPSULE ORAL at 11:07

## 2022-07-01 RX ADMIN — METOPROLOL TARTRATE 50 MG: 50 TABLET, FILM COATED ORAL at 09:23

## 2022-07-01 RX ADMIN — ACETAMINOPHEN 1000 MG: 500 TABLET ORAL at 06:32

## 2022-07-01 RX ADMIN — OMEPRAZOLE 20 MG: 20 CAPSULE, DELAYED RELEASE ORAL at 06:32

## 2022-07-01 RX ADMIN — ACETAMINOPHEN 1000 MG: 500 TABLET ORAL at 11:07

## 2022-07-01 RX ADMIN — ACETAMINOPHEN 1000 MG: 500 TABLET ORAL at 00:13

## 2022-07-01 RX ADMIN — GABAPENTIN 900 MG: 300 CAPSULE ORAL at 06:33

## 2022-07-01 RX ADMIN — DOCUSATE SODIUM 100 MG: 100 CAPSULE, LIQUID FILLED ORAL at 06:32

## 2022-07-01 RX ADMIN — HYDROCHLOROTHIAZIDE 25 MG: 25 TABLET ORAL at 06:32

## 2022-07-01 ASSESSMENT — PAIN DESCRIPTION - PAIN TYPE: TYPE: SURGICAL PAIN

## 2022-07-01 ASSESSMENT — COGNITIVE AND FUNCTIONAL STATUS - GENERAL
DAILY ACTIVITIY SCORE: 21
TOILETING: A LITTLE
DRESSING REGULAR LOWER BODY CLOTHING: A LITTLE
HELP NEEDED FOR BATHING: A LITTLE
SUGGESTED CMS G CODE MODIFIER DAILY ACTIVITY: CJ

## 2022-07-01 ASSESSMENT — ACTIVITIES OF DAILY LIVING (ADL): TOILETING: INDEPENDENT

## 2022-07-01 NOTE — CARE PLAN
The patient is Stable - Low risk of patient condition declining or worsening    Shift Goals  Clinical Goals: bowel movement; mobility  Patient Goals: pain control; movement    Progress made toward(s) clinical / shift goals:    Problem: Pain - Standard  Goal: Alleviation of pain or a reduction in pain to the patient’s comfort goal  Outcome: Progressing     Problem: Knowledge Deficit - Standard  Goal: Patient and family/care givers will demonstrate understanding of plan of care, disease process/condition, diagnostic tests and medications  Outcome: Progressing       Patient is not progressing towards the following goals:

## 2022-07-01 NOTE — DISCHARGE INSTRUCTIONS
Discharge Instructions    Discharged to home by car with relative. Discharged via wheelchair, hospital escort: Yes.  Special equipment needed: Not Applicable    Be sure to schedule a follow-up appointment with your primary care doctor or any specialists as instructed.     Discharge Plan:        I understand that a diet low in cholesterol, fat, and sodium is recommended for good health. Unless I have been given specific instructions below for another diet, I accept this instruction as my diet prescription.   Other diet: Regular    Special Instructions: None    Is patient discharged on Warfarin / Coumadin?   No     Surgical Spinal Decompression, Care After  This sheet gives you information about how to care for yourself after your procedure. Your health care provider may also give you more specific instructions. If you have problems or questions, contact your health care provider.  What can I expect after the procedure?  After the procedure, it is common to have:  Pain in the surgical area for the first few days.  Muscle tightening (spasms) across the back.  Numbness in the legs or the back.  Weakness in the legs.  Follow these instructions at home:  Incision care    Follow instructions from your health care provider about how to take care of your incision. Make sure you:  Wash your hands with soap and water before you change your bandage (dressing). If soap and water are not available, use hand .  Change your dressing as told by your health care provider. You may need to have someone change your dressing for you.  Leave stitches (sutures), skin glue, or adhesive strips in place. These skin closures may need to stay in place for 2 weeks or longer. If adhesive strip edges start to loosen and curl up, you may trim the loose edges. Do not remove adhesive strips completely unless your health care provider tells you to do that.  Check your incision area every day for signs of infection. If you cannot see your  incision, have someone check it for you. Check for:  More redness, swelling, or pain.  Fluid or blood.  Warmth.  Pus or a bad smell.  Bathing  Do not take baths, swim, or use a hot tub until your health care provider approves. Ask your health care provider if you may take showers. You may only be allowed to take sponge baths.  Keep the dressing dry until your health care provider says it can be removed.  Activity  Return to your normal activities as told by your health care provider. Ask your health care provider what activities are safe for you.  Rest as told by your health care provider.  Avoid sitting for a long time without moving. Get up to take short walks every 1-2 hours. This is important to improve blood flow and breathing. Ask for help if you feel weak or unsteady.  Do not bend or twist at the waist until your health care provider approves. To lower yourself to pick things up, bend your knees instead of tipping your upper body forward.  Do not lift anything that is heavier than 10 lb (4.5 kg), or the limit that you are told, until your health care provider says that it is safe. Avoid lifting anything above the level of your head.  Sit, stand, walk, turn in bed, and reposition yourself as told by your health care provider. This will help to keep your spine in proper alignment.  Avoid pushing and pulling motions.  Do exercises as told by your health care provider or physical therapist.  Avoid doing household chores that require a lot of effort, such as vacuuming.  Managing pain, stiffness, and swelling    If directed, put ice on the injured area:  Put ice in a plastic bag.  Place a towel between your skin and the bag.  Leave the ice on for 20 minutes, 2-3 times a day.  Driving  Do not drive or use heavy machinery while taking prescription pain medicine.  Ask your health care provider when it is safe to drive.  General instructions  Take over-the-counter and prescription medicines only as told by your health  care provider.  Do not use any products that contain nicotine or tobacco, such as cigarettes and e-cigarettes. These can delay incision and bone healing. If you need help quitting, ask your health care provider.  If you are taking prescription pain medicine, take actions to prevent or treat constipation. Your health care provider may recommend that you:  Drink enough fluid to keep your urine pale yellow.  Eat foods that are high in fiber, such as fresh fruits and vegetables, whole grains, and beans.  Limit foods that are high in fat and processed sugars, such as fried or sweet foods.  Take an over-the-counter or prescription medicine for constipation.  If you have a back brace, wear it as told by your health care provider. Remove it only as told.  Keep all follow-up visits as told by your health care provider. This is important.  Contact a health care provider if you:  Have a fever.  Notice that your incision feels warm to the touch.  Have more redness, swelling, or pain at the site of your incision.  Have pus or a bad smell coming from your incision.  Have fluid or blood coming from your incision.  Have pain that is not controlled by medicine.  Have new numbness, tingling, or weakness in any part of your body.  Get help right away if you:  Have increasing pain, numbness, or weakness.  Lose control of when you urinate or have a bowel movement (have incontinence).  Cannot move a part of your body (paralysis).  Notice that your incision feels swollen and tender, and the surrounding area looks like a lump. The lump may be red or bluish in color.  Develop pain in your lower leg or at the back of your knee.  Have difficulty breathing.  Have chest pain.  These symptoms may represent a serious problem that is an emergency. Do not wait to see if the symptoms will go away. Get medical help right away. Call your local emergency services (911 in the [place] Do not drive yourself to the hospital.  Summary  After the procedure,  it is common to have some pain, weakness, and fatigue.  Follow instructions from your health care provider about how to take care of your incision. Do not let it get wet until your health care provider approves.  Rest as told by your health care provider. Follow instructions about activity and movements.  Take over-the-counter and prescription medicines only as told by your health care provider.  Make sure you know what symptoms should cause you to get help right away.  This information is not intended to replace advice given to you by your health care provider. Make sure you discuss any questions you have with your health care provider.  Document Released: 05/03/2016 Document Revised: 11/30/2018 Document Reviewed: 11/14/2018  [employer] Patient Education © 2020 [employer]      Depression / Suicide Risk    As you are discharged from this [facility] facility, it is important to learn how to keep safe from harming yourself.    Recognize the warning signs:  Abrupt changes in personality, positive or negative- including increase in energy   Giving away possessions  Change in eating patterns- significant weight changes-  positive or negative  Change in sleeping patterns- unable to sleep or sleeping all the time   Unwillingness or inability to communicate  Depression  Unusual sadness, discouragement and loneliness  Talk of wanting to die  Neglect of personal appearance   Rebelliousness- reckless behavior  Withdrawal from people/activities they love  Confusion- inability to concentrate     If you or a loved one observes any of these behaviors or has concerns about self-harm, here's what you can do:  Talk about it- your feelings and reasons for harming yourself  Remove any means that you might use to hurt yourself (examples: pills, rope, extension cords, firearm)  Get professional help from the community (Mental Health, Substance Abuse, psychological counseling)  Do not be alone:Call your Safe Contact- someone whom you  trust who will be there for you.  Call your local CRISIS HOTLINE 849-1855 or 806-007-4762  Call your local Children's Mobile Crisis Response Team [place] (406) 014-8103 or www.SmartThings  Call the toll free National Suicide Prevention Hotlines   National Suicide Prevention Lifeline 690-701-ISPX (2603)  [facility] Line Network 800-SUICIDE (561-7835)

## 2022-07-01 NOTE — DISCHARGE SUMMARY
Discharge Summary    CHIEF COMPLAINT ON ADMISSION  No chief complaint on file.      Reason for Admission  Spinal stenosis, lumbar region with lumbar spinal stenosis  L1-S1 Spinal stenosis: L1-S1 lumbar laminectomy    Admission Date  6/29/2022    CODE STATUS  Full Code    HPI & HOSPITAL COURSE  This is a 66 y.o. male here with multilevel lumbar spinal stenosis.  He had saddle anesthesia and bilateral lower extremity radiculopathy symptoms prior to surgery.  He had MRI and x-ray imaging which confirmed this diagnosis.  He had persisting symptoms despite multiple conservative therapies and was offered the above-mentioned surgery and he did consent.    He underwent the above operation without complication and was transferred to medical surgical floor following surgery.  On postoperative day #1 his Hemovac output was very low.  There was blood clot in the tubing.  This was replaced but the Hemovac output remained low.  He worked with physical therapy and was ambulating without difficulties.  His preoperative symptoms were much improved.  On postoperative day #2 he continued to make progress.  He was hemodynamically and neurologically stable.  He was eating, drinking, mobilizing and voiding.  He was neurologically intact with improvement to his preoperative symptoms.  At that time it was determined he met criteria for discharge and was discharged home in stable condition.    No notes on file    Therefore, he is discharged in good and stable condition to home with close outpatient follow-up.    The patient recovered much more quickly than anticipated on admission.    Discharge Date  7/1/2022    FOLLOW UP ITEMS POST DISCHARGE  Follow-up with Dr. Ceballos in 2 and 6 weeks time  He is to call with any changes to his condition  -He was specifically instructed to watch for any changes to his wound including increased drainage.  If he has any changes to his lower extremity symptoms specifically new pain or new weakness in the legs  he is to let our office know immediately.  If he has any saddle anesthesias he needs to let us know immediately or report to the emergency department.  Nothing more strenuous than walking  No NSAIDs  He may shower tomorrow.  He is not to submerge the wound  He is to contact Dr. Ceballos's office with any other questions or concerns    DISCHARGE DIAGNOSES  Principal Problem:    Neurogenic claudication due to lumbar spinal stenosis POA: Unknown      Overview: Added automatically from request for surgery [id number]  Active Problems:    Lumbar stenosis with neurogenic claudication POA: Yes  Resolved Problems:    * No resolved hospital problems. *      FOLLOW UP  Future Appointments   Date Time Provider Department Center   7/13/2022 11:30 AM [facility]   8/11/2022 11:30 AM [facility]   10/6/2022 10:45 AM JUVENCIO [facility] None     No follow-up provider specified.    MEDICATIONS ON DISCHARGE     Medication List      START taking these medications      Instructions   cephALEXin 500 MG Caps  Commonly known as: KEFLEX   Take 1 Capsule by mouth 4 times a day.  Dose: 500 mg     oxyCODONE immediate-release 5 MG Tabs  Commonly known as: ROXICODONE   Take 1 Tablet by mouth every four hours as needed for Severe Pain for up to 6 days.  Dose: 5 mg     tizanidine 2 MG tablet  Commonly known as: ZANAFLEX   Take 1-2 Tablets by mouth 3 times a day as needed (spasm).  Dose: 2-4 mg        CHANGE how you take these medications      Instructions   gabapentin 300 MG Caps  What changed: when to take this  Commonly known as: NEURONTIN   Take 3 Capsules by mouth 3 times a day for 30 days.  Dose: 900 mg        CONTINUE taking these medications      Instructions   amLODIPine 5 MG Tabs  Commonly known as: NORVASC   Take 5 mg by mouth every evening.  Dose: 5 mg     atorvastatin 80 MG tablet  Commonly known as: LIPITOR   Take 1 Tablet by mouth every evening.  Dose: 80 mg      hydroCHLOROthiazide 25 MG Tabs  Commonly known as: HYDRODIURIL   Take 25 mg by mouth every day.  Dose: 25 mg     metoprolol tartrate 50 MG Tabs  Commonly known as: Lopressor   Take 1 Tab by mouth 2 times a day.  Dose: 50 mg        STOP taking these medications    cyclobenzaprine 10 mg Tabs  Commonly known as: Flexeril     therapeutic multivitamin-minerals Tabs            Allergies  No Known Allergies    DIET  Orders Placed This Encounter   Procedures   • Diet Order Diet: Regular     Standing Status:   Standing     Number of Occurrences:   1     Order Specific Question:   Diet:     Answer:   Regular [1]       ACTIVITY  As tolerated.  Weight bearing as tolerated    CONSULTATIONS  None    PROCEDURES  L1-S1 lumbar laminectomy with bilateral foraminotomies    LABORATORY  Lab Results   Component Value Date    SODIUM 134 (L) 06/30/2022    POTASSIUM 4.6 06/30/2022    CHLORIDE 97 06/30/2022    CO2 26 06/30/2022    GLUCOSE 148 (H) 06/30/2022    BUN 23 (H) 06/30/2022    CREATININE 0.87 06/30/2022        Lab Results   Component Value Date    WBC 8.8 07/01/2022    HEMOGLOBIN 11.3 (L) 07/01/2022    HEMATOCRIT 31.1 (L) 07/01/2022    PLATELETCT 125 (L) 07/01/2022        Total time of the discharge process exceeds 35 minutes.

## 2022-07-01 NOTE — THERAPY
Occupational Therapy   Initial Evaluation     Patient Name: Santi Landers  Age:  66 y.o., Sex:  male  Medical Record #: [id number]  Today's Date: 7/1/2022     Precautions  Precautions: Spinal / Back Precautions   Comments: no brace    Assessment  Patient is 66 y.o. male s/p elective lumbar spine surgery. Pt edu on spinal precautions, compensatory strategies during ADLs as well as appropriate AE/DME to maximize independence and safety. He is at a supervision level for basic self care, functional mobility, and functional transfers. He denies any further deficits in self care at this time.        Plan    Recommend Occupational Therapy for Evaluation only     DC Equipment Recommendations: None  Discharge Recommendations: Anticipate that the patient will have no further occupational therapy needs after discharge from the hospital        Objective       07/01/22 0910   Prior Living Situation   Prior Services Home-Independent   Housing / Facility 1 Story House   Bathroom Set up Bathtub / Shower Combination   Equipment Owned None   Lives with - Patient's Self Care Capacity Spouse   Comments Wife can assist when able when not working.   Prior Level of ADL Function   Self Feeding Independent   Grooming / Hygiene Independent   Bathing Independent   Dressing Independent   Toileting Independent   Prior Level of IADL Function   Medication Management Independent   Laundry Independent   Kitchen Mobility Independent   Finances Independent   Home Management Independent   Shopping Independent   Prior Level Of Mobility Independent Without Device in Community   Driving / Transportation Driving Independent   Occupation (Pre-Hospital Vocational) Employed Full Time   Precautions   Precautions Spinal / Back Precautions    Comments no brace   Pain 0 - 10 Group   Therapist Pain Assessment Nurse Notified;During Activity  (min c/o back pain)   Cognition    Cognition / Consciousness WDL   Level of Consciousness Alert   Active ROM Upper Body    Active ROM Upper Body  WDL   Strength Upper Body   Upper Body Strength  WDL   Balance Assessment   Sitting Balance (Static) Good   Sitting Balance (Dynamic) Fair +   Standing Balance (Static) Good   Standing Balance (Dynamic) Fair +   Weight Shift Sitting Good   Weight Shift Standing Good   Comments no AD   Bed Mobility    Supine to Sit Supervised   Sit to Supine Supervised   Scooting Supervised   Rolling Supervised   Comments via log roll   ADL Assessment   Grooming Supervision;Standing   Upper Body Dressing Supervision   Lower Body Dressing Supervision   Toileting Supervision  (simulated)   Comments educated on spinal precautions, compensatory strategies during ADLs   How much help from another person does the patient currently need...   Putting on and taking off regular lower body clothing? 3   Bathing (including washing, rinsing, and drying)? 3   Toileting, which includes using a toilet, bedpan, or urinal? 3   Putting on and taking off regular upper body clothing? 4   Taking care of personal grooming such as brushing teeth? 4   Eating meals? 4   6 Clicks Daily Activity Score 21   Functional Mobility   Sit to Stand Supervised   Bed, Chair, Wheelchair Transfer Supervised   Toilet Transfers Supervised   Tub / Shower Transfers Supervised  (simulated)   Mobility in room mobility   Comments no AD

## 2022-07-01 NOTE — CARE PLAN
The patient is Stable - Low risk of patient condition declining or worsening    Shift Goals  Clinical Goals: Mobility, discharge  Patient Goals: go home, OT eval  Family Goals: Not present    Progress made toward(s) clinical / shift goals:      Problem: Pain - Standard  Goal: Alleviation of pain or a reduction in pain to the patient’s comfort goal  Note: Rates pain 3/10. Declines interventions at this time. States that scheduled tylenol and gabapentin work well for him .      Problem: Knowledge Deficit - Standard  Goal: Patient and family/care givers will demonstrate understanding of plan of care, disease process/condition, diagnostic tests and medications  Note: POC discussed with patient. Awaiting meds to bed for discharge. Educated patient on discharge instructions. All questions and concerns addressed during this time.        Patient is not progressing towards the following goals:

## 2022-07-01 NOTE — PROGRESS NOTES
Patient discharged home with wife. IV, hemovac, on-q pump taken out per order. Education provided on discharge instructions, medications, and self care. Patient verbalized understanding. Discharge instructions, prescriptions, and personal belongings with the patient upon leaving the unit. Patient refused wheelchair, but was able to ambulate with steady gait.

## 2022-07-06 ENCOUNTER — TELEPHONE (OUTPATIENT)
Dept: NEUROSURGERY | Facility: MEDICAL CENTER | Age: 67
End: 2022-07-06
Payer: COMMERCIAL

## 2022-07-06 NOTE — TELEPHONE ENCOUNTER
I called and spoke with Vladimir.  He had some pain down into his left leg that was new since surgery.  This seems to resolve with tizanidine.  He is ambulating without difficulties and having bowel movements.  His surgical site pain is controlled.  No significant drainage from the wound.  He will call with any changes.

## 2022-07-12 ENCOUNTER — APPOINTMENT (RX ONLY)
Dept: URBAN - METROPOLITAN AREA CLINIC 4 | Facility: CLINIC | Age: 67
Setting detail: DERMATOLOGY
End: 2022-07-12

## 2022-07-12 DIAGNOSIS — L57.0 ACTINIC KERATOSIS: ICD-10-CM

## 2022-07-12 DIAGNOSIS — Z71.89 OTHER SPECIFIED COUNSELING: ICD-10-CM

## 2022-07-12 DIAGNOSIS — D18.0 HEMANGIOMA: ICD-10-CM

## 2022-07-12 DIAGNOSIS — L82.1 OTHER SEBORRHEIC KERATOSIS: ICD-10-CM

## 2022-07-12 DIAGNOSIS — D22 MELANOCYTIC NEVI: ICD-10-CM

## 2022-07-12 DIAGNOSIS — Z85.820 PERSONAL HISTORY OF MALIGNANT MELANOMA OF SKIN: ICD-10-CM

## 2022-07-12 DIAGNOSIS — L81.4 OTHER MELANIN HYPERPIGMENTATION: ICD-10-CM

## 2022-07-12 DIAGNOSIS — Z85.828 PERSONAL HISTORY OF OTHER MALIGNANT NEOPLASM OF SKIN: ICD-10-CM

## 2022-07-12 PROBLEM — D22.5 MELANOCYTIC NEVI OF TRUNK: Status: ACTIVE | Noted: 2022-07-12

## 2022-07-12 PROBLEM — D18.01 HEMANGIOMA OF SKIN AND SUBCUTANEOUS TISSUE: Status: ACTIVE | Noted: 2022-07-12

## 2022-07-12 PROCEDURE — ? PHOTO-DOCUMENTATION

## 2022-07-12 PROCEDURE — ? OBSERVATION

## 2022-07-12 PROCEDURE — 99213 OFFICE O/P EST LOW 20 MIN: CPT | Mod: 25

## 2022-07-12 PROCEDURE — 17003 DESTRUCT PREMALG LES 2-14: CPT

## 2022-07-12 PROCEDURE — ? LIQUID NITROGEN

## 2022-07-12 PROCEDURE — 17000 DESTRUCT PREMALG LESION: CPT

## 2022-07-12 PROCEDURE — ? COUNSELING

## 2022-07-12 PROCEDURE — ? ADDITIONAL NOTES

## 2022-07-12 ASSESSMENT — LOCATION DETAILED DESCRIPTION DERM
LOCATION DETAILED: RIGHT SUPERIOR MEDIAL FOREHEAD
LOCATION DETAILED: PERIUMBILICAL SKIN
LOCATION DETAILED: RIGHT SUPERIOR LATERAL BUCCAL CHEEK
LOCATION DETAILED: SUPERIOR THORACIC SPINE
LOCATION DETAILED: RIGHT ANTERIOR DISTAL THIGH
LOCATION DETAILED: RIGHT SUPERIOR FOREHEAD
LOCATION DETAILED: INFERIOR MID FOREHEAD
LOCATION DETAILED: LEFT CENTRAL TEMPLE
LOCATION DETAILED: RIGHT ANTERIOR PROXIMAL UPPER ARM
LOCATION DETAILED: LEFT ANTERIOR PROXIMAL UPPER ARM
LOCATION DETAILED: MID-OCCIPITAL SCALP
LOCATION DETAILED: RIGHT ULNAR DORSAL HAND
LOCATION DETAILED: LEFT RADIAL DORSAL HAND
LOCATION DETAILED: LEFT ANTERIOR DISTAL UPPER ARM
LOCATION DETAILED: LEFT ANTERIOR DISTAL THIGH
LOCATION DETAILED: LEFT MEDIAL MALAR CHEEK
LOCATION DETAILED: SUPERIOR MID FOREHEAD
LOCATION DETAILED: EPIGASTRIC SKIN
LOCATION DETAILED: RIGHT SUPERIOR PARIETAL SCALP

## 2022-07-12 ASSESSMENT — LOCATION SIMPLE DESCRIPTION DERM
LOCATION SIMPLE: LEFT THIGH
LOCATION SIMPLE: ABDOMEN
LOCATION SIMPLE: INFERIOR FOREHEAD
LOCATION SIMPLE: UPPER BACK
LOCATION SIMPLE: RIGHT HAND
LOCATION SIMPLE: RIGHT FOREHEAD
LOCATION SIMPLE: POSTERIOR SCALP
LOCATION SIMPLE: SCALP
LOCATION SIMPLE: LEFT CHEEK
LOCATION SIMPLE: LEFT TEMPLE
LOCATION SIMPLE: SUPERIOR FOREHEAD
LOCATION SIMPLE: RIGHT THIGH
LOCATION SIMPLE: RIGHT UPPER ARM
LOCATION SIMPLE: RIGHT CHEEK
LOCATION SIMPLE: LEFT UPPER ARM
LOCATION SIMPLE: LEFT HAND

## 2022-07-12 ASSESSMENT — LOCATION ZONE DERM
LOCATION ZONE: TRUNK
LOCATION ZONE: ARM
LOCATION ZONE: FACE
LOCATION ZONE: SCALP
LOCATION ZONE: HAND
LOCATION ZONE: LEG

## 2022-07-12 NOTE — PROCEDURE: COUNSELING
Detail Level: Detailed
When Should The Patient Follow-Up For Their Next Full-Body Skin Exam?: 1 Year
Detail Level: Zone
Quality 137: Melanoma: Continuity Of Care - Recall System: Patient information entered into a recall system that includes: target date for the next exam specified AND a process to follow up with patients regarding missed or unscheduled appointments
Detail Level: Simple

## 2022-07-12 NOTE — PROCEDURE: ADDITIONAL NOTES
Render Risk Assessment In Note?: no
Detail Level: Simple
Additional Notes: Efudex this fall
Additional Notes: Monitoring- will recheck in 6 months.

## 2022-10-05 ENCOUNTER — TELEPHONE (OUTPATIENT)
Dept: CARDIOLOGY | Facility: MEDICAL CENTER | Age: 67
End: 2022-10-05

## 2022-10-05 NOTE — TELEPHONE ENCOUNTER
SC    Caller:  Jeremiah Hancock    Topic/issue: Santi would like to know if you have received the form, that came over from Dr. Martin in [place].  Form is for his  info  He will need to  tomorrow at his appt.    Callback Number: - [phone]    Thank you,   Cindy ARELLANO

## 2022-10-06 ENCOUNTER — TELEPHONE (OUTPATIENT)
Dept: CARDIOLOGY | Facility: MEDICAL CENTER | Age: 67
End: 2022-10-06

## 2022-10-06 ENCOUNTER — OFFICE VISIT (OUTPATIENT)
Dept: CARDIOLOGY | Facility: MEDICAL CENTER | Age: 67
End: 2022-10-06
Payer: COMMERCIAL

## 2022-10-06 VITALS
WEIGHT: 220 LBS | HEART RATE: 62 BPM | OXYGEN SATURATION: 96 % | SYSTOLIC BLOOD PRESSURE: 124 MMHG | HEIGHT: 69 IN | DIASTOLIC BLOOD PRESSURE: 78 MMHG | BODY MASS INDEX: 32.58 KG/M2 | RESPIRATION RATE: 16 BRPM

## 2022-10-06 DIAGNOSIS — E78.5 DYSLIPIDEMIA: ICD-10-CM

## 2022-10-06 DIAGNOSIS — I10 ESSENTIAL HYPERTENSION, BENIGN: ICD-10-CM

## 2022-10-06 DIAGNOSIS — Z95.1 S/P CABG (CORONARY ARTERY BYPASS GRAFT): ICD-10-CM

## 2022-10-06 DIAGNOSIS — I25.10 CORONARY ARTERY DISEASE INVOLVING NATIVE CORONARY ARTERY OF NATIVE HEART WITHOUT ANGINA PECTORIS: ICD-10-CM

## 2022-10-06 LAB — EKG IMPRESSION: NORMAL

## 2022-10-06 PROCEDURE — 99214 OFFICE O/P EST MOD 30 MIN: CPT | Performed by: NURSE PRACTITIONER

## 2022-10-06 PROCEDURE — 93000 ELECTROCARDIOGRAM COMPLETE: CPT | Performed by: INTERNAL MEDICINE

## 2022-10-06 RX ORDER — CYCLOBENZAPRINE HCL 10 MG
TABLET ORAL
COMMUNITY
Start: 2022-10-04

## 2022-10-06 ASSESSMENT — ENCOUNTER SYMPTOMS
PND: 0
BACK PAIN: 0
SHORTNESS OF BREATH: 0
CLAUDICATION: 0
MYALGIAS: 0
COUGH: 0
ORTHOPNEA: 0
FEVER: 0
DIZZINESS: 0
PALPITATIONS: 0
ABDOMINAL PAIN: 0

## 2022-10-06 ASSESSMENT — FIBROSIS 4 INDEX: FIB4 SCORE: 2.94

## 2022-10-06 NOTE — TELEPHONE ENCOUNTER
Received notification from [facility] indicating patient will be coming in for DOT medical certificate. Provider's office requests assistance in determining if patient has met necessary medical criteria. Form signed by SC and faxed to [phone], receipt confirmed.

## 2022-10-06 NOTE — PROGRESS NOTES
Chief Complaint   Patient presents with    Dyslipidemia    Hypertension     F/V Dx: Essential hypertension, benign     Subjective:   Santi Landers is a 63 y.o. male who presents today for 12 month follow up for his coronary disease.     Hx of CAD with CABG X3 in '18, HLD, HTN, obesity, and chronic back pain.    He lives in [place], [place].     He is feeling overall great. He is still working as a  in [place], [place]. 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 He will need an echocardiogram order for this, we will order this today.    He just had back surgery and is doing much better with back and hip pain.    He has no other cardiac complaints. He has no chest pain, shortness of breath, edema, dizziness/lightheadedness, or palpitations.    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    Chest burning while dancing    Arthritis     CAD (coronary artery disease)     S/P CABG    High cholesterol     Hypertension     Pain     Low back pain     Past Surgical History:   Procedure Laterality Date    PB STALLINGS W/O FACETEC FORAMOT/[facility] 1/2 VRT SEG, HECTOR* Bilateral 6/29/2022    Procedure: DECOMPRESSION , SPINE, LUMBAR L1-L5, L-5-S1;  Surgeon: Karen Ceballos M.D.;  Location: [facility];  Service: Orthopedics    LUMBAR LAMINECTOMY DISKECTOMY Bilateral 6/29/2022    Procedure: LAMINECTOMY, SPINE, LUMBAR L1-L5 , L5-S1;  Surgeon: Karen Ceballos M.D.;  Location: [facility];  Service: Orthopedics    FORAMINOTOMY Bilateral 6/29/2022    Procedure: FORAMINOTOMY, SPINE;  Surgeon: Karen Ceballos M.D.;  Location: [facility];  Service: Orthopedics    MULTIPLE CORONARY ARTERY BYPASS ENDO VEIN HARVEST  10/24/2018    Procedure: MULTIPLE CORONARY ARTERY BYPASS x 3 ENDO VEIN HARVEST- RIGHT LEG, BILATERAL ANTERIOR MAMMARY ARTERY HARVEST;  Surgeon: Berhane Canela M.D.;  Location: [facility];  Service: Cardiac    LORENA  10/24/2018    Procedure: LORENA;  Surgeon: Berhane  YOLANDA Canela M.D.;  Location: [facility];  Service: Cardiac    CHOLECYSTECTOMY  1989    ZZ CARDIAC CATH       History reviewed. No pertinent family history.  Social History     Socioeconomic History    Marital status:      Spouse name: Not on file    Number of children: Not on file    Years of education: Not on file    Highest education level: Not on file   Occupational History    Not on file   Tobacco Use    Smoking status: Never    Smokeless tobacco: Never   Vaping Use    Vaping Use: Never used   Substance and Sexual Activity    Alcohol use: Yes     Comment: 3-4 DRINKS DAILY     Drug use: No    Sexual activity: Not on file   Other Topics Concern    Not on file   Social History Narrative    Not on file     Social Determinants of Health     Financial Resource Strain: Not on file   Food Insecurity: Not on file   Transportation Needs: Not on file   Physical Activity: Not on file   Stress: Not on file   Social Connections: Not on file   Intimate Partner Violence: Not on file   Housing Stability: Not on file     No Known Allergies  Outpatient Encounter Medications as of 10/6/2022   Medication Sig Dispense Refill    cyclobenzaprine (FLEXERIL) 10 mg Tab       aspirin EC (ECOTRIN) 81 MG Tablet Delayed Response Take 1 Tablet by mouth every day. 30 Tablet     gabapentin (NEURONTIN) 300 MG Cap Take 3 Capsules by mouth 3 times a day. 45 Capsule 0    tizanidine (ZANAFLEX) 2 MG tablet Take 1-2 Tablets by mouth 3 times a day as needed (spasm). 45 Tablet 0    hydroCHLOROthiazide (HYDRODIURIL) 25 MG Tab Take 25 mg by mouth every day.      atorvastatin (LIPITOR) 80 MG tablet Take 1 Tablet by mouth every evening.      metoprolol (LOPRESSOR) 50 MG Tab Take 1 Tab by mouth 2 times a day. 180 Tab 3    amLODIPine (NORVASC) 5 MG Tab Take 5 mg by mouth every evening. 90 Tab 3    [DISCONTINUED] meloxicam (MOBIC) 15 MG tablet Take one tablet with breakfast as needed for pain for 14 days, then as needed. No  "advil/aleve/motrin/ibuprofen on same day. (Patient not taking: Reported on 10/6/2022) 60 Tablet 5    [DISCONTINUED] cephALEXin (KEFLEX) 500 MG Cap Take 1 Capsule by mouth 4 times a day. (Patient not taking: Reported on 10/6/2022) 20 Capsule 0     No facility-administered encounter medications on file as of 10/6/2022.     Review of Systems   Constitutional:  Negative for fever and malaise/fatigue.   Respiratory:  Negative for cough and shortness of breath.    Cardiovascular:  Negative for chest pain, palpitations, orthopnea, claudication, leg swelling and PND.   Gastrointestinal:  Negative for abdominal pain.   Musculoskeletal:  Negative for back pain and myalgias.   Neurological:  Negative for dizziness.      Objective:   /78 (BP Location: Left arm, Patient Position: Sitting, BP Cuff Size: Adult)   Pulse 62   Resp 16   Ht 1.753 m (5' 9\")   Wt 99.8 kg (220 lb)   SpO2 96%   BMI 32.49 kg/m²     Physical Exam  Vitals and nursing note reviewed.   Constitutional:       Appearance: Normal appearance. He is well-developed. He is obese.   HENT:      Head: Normocephalic and atraumatic.   Neck:      Vascular: No JVD.   Cardiovascular:      Rate and Rhythm: Normal rate and regular rhythm.      Pulses: Normal pulses.      Heart sounds: Normal heart sounds.   Pulmonary:      Effort: Pulmonary effort is normal.      Breath sounds: Normal breath sounds.   Musculoskeletal:         General: Normal range of motion.      Comments: Mild edema in R ankle   Skin:     General: Skin is warm and dry.      Capillary Refill: Capillary refill takes less than 2 seconds.   Neurological:      General: No focal deficit present.      Mental Status: He is alert and oriented to person, place, and time. Mental status is at baseline.   Psychiatric:         Mood and Affect: Mood normal.         Behavior: Behavior normal.         Thought Content: Thought content normal.         Judgment: Judgment normal.       Assessment:     1. Essential " hypertension, benign  EKG    EC-ECHOCARDIOGRAM COMPLETE W/O CONT      2. Dyslipidemia  EC-ECHOCARDIOGRAM COMPLETE W/O CONT      3. Coronary artery disease involving native coronary artery of native heart without angina pectoris  EC-ECHOCARDIOGRAM COMPLETE W/O CONT      4. S/P CABG (coronary artery bypass graft)  EC-ECHOCARDIOGRAM COMPLETE W/O CONT        Medical Decision Making:  Today's Assessment / Status / Plan:     1. CAD S/P CABG X3 in '18  -no angina or JANG  -cont asa, statin, bb  -order echo for DOT clearance    2. HLD  -statin  -LDL goal <70 with CAD  -annual labs per PCP    3. HTN  -great control  -cont metoprolol 50 mg BID, HCTZ 25 mg QD, amlodipine 5 mg QPM  -follow at home and with PCP office  -BP goal <130/80    FU in clinic in 12 months with Darleen DILL in clinic with annual labs and echocardiogram.

## 2022-10-06 NOTE — TELEPHONE ENCOUNTER
Tried to call pt, no answer, left msg informing form sent to me today via email. Forwarding email to SC, as I am working remotely and can't scan from home.

## 2023-01-12 ENCOUNTER — APPOINTMENT (RX ONLY)
Dept: URBAN - METROPOLITAN AREA CLINIC 4 | Facility: CLINIC | Age: 68
Setting detail: DERMATOLOGY
End: 2023-01-12

## 2023-01-12 DIAGNOSIS — Z71.89 OTHER SPECIFIED COUNSELING: ICD-10-CM

## 2023-01-12 DIAGNOSIS — Z85.828 PERSONAL HISTORY OF OTHER MALIGNANT NEOPLASM OF SKIN: ICD-10-CM

## 2023-01-12 DIAGNOSIS — L82.1 OTHER SEBORRHEIC KERATOSIS: ICD-10-CM

## 2023-01-12 DIAGNOSIS — D22 MELANOCYTIC NEVI: ICD-10-CM

## 2023-01-12 DIAGNOSIS — L81.4 OTHER MELANIN HYPERPIGMENTATION: ICD-10-CM

## 2023-01-12 DIAGNOSIS — Z85.820 PERSONAL HISTORY OF MALIGNANT MELANOMA OF SKIN: ICD-10-CM

## 2023-01-12 DIAGNOSIS — L57.0 ACTINIC KERATOSIS: ICD-10-CM

## 2023-01-12 PROBLEM — D22.5 MELANOCYTIC NEVI OF TRUNK: Status: ACTIVE | Noted: 2023-01-12

## 2023-01-12 PROBLEM — D22.62 MELANOCYTIC NEVI OF LEFT UPPER LIMB, INCLUDING SHOULDER: Status: ACTIVE | Noted: 2023-01-12

## 2023-01-12 PROBLEM — D22.61 MELANOCYTIC NEVI OF RIGHT UPPER LIMB, INCLUDING SHOULDER: Status: ACTIVE | Noted: 2023-01-12

## 2023-01-12 PROBLEM — D22.71 MELANOCYTIC NEVI OF RIGHT LOWER LIMB, INCLUDING HIP: Status: ACTIVE | Noted: 2023-01-12

## 2023-01-12 PROBLEM — D22.72 MELANOCYTIC NEVI OF LEFT LOWER LIMB, INCLUDING HIP: Status: ACTIVE | Noted: 2023-01-12

## 2023-01-12 PROBLEM — D22.39 MELANOCYTIC NEVI OF OTHER PARTS OF FACE: Status: ACTIVE | Noted: 2023-01-12

## 2023-01-12 PROCEDURE — ? COUNSELING

## 2023-01-12 PROCEDURE — 99213 OFFICE O/P EST LOW 20 MIN: CPT | Mod: 25

## 2023-01-12 PROCEDURE — ? OBSERVATION

## 2023-01-12 PROCEDURE — ? LIQUID NITROGEN

## 2023-01-12 PROCEDURE — 17003 DESTRUCT PREMALG LES 2-14: CPT

## 2023-01-12 PROCEDURE — 17000 DESTRUCT PREMALG LESION: CPT

## 2023-01-12 ASSESSMENT — LOCATION DETAILED DESCRIPTION DERM
LOCATION DETAILED: RIGHT CENTRAL MALAR CHEEK
LOCATION DETAILED: LEFT CENTRAL MALAR CHEEK
LOCATION DETAILED: LEFT ANTERIOR DISTAL UPPER ARM
LOCATION DETAILED: LEFT ULNAR DORSAL HAND
LOCATION DETAILED: LEFT SUPERIOR FOREHEAD
LOCATION DETAILED: RIGHT ANTERIOR DISTAL THIGH
LOCATION DETAILED: LEFT DISTAL POSTERIOR UPPER ARM
LOCATION DETAILED: LEFT ANTERIOR PROXIMAL THIGH
LOCATION DETAILED: RIGHT INFERIOR CENTRAL MALAR CHEEK
LOCATION DETAILED: RIGHT DISTAL POSTERIOR UPPER ARM
LOCATION DETAILED: RIGHT ANTERIOR PROXIMAL THIGH
LOCATION DETAILED: RIGHT INFERIOR MEDIAL UPPER BACK
LOCATION DETAILED: LEFT ANTERIOR DISTAL THIGH
LOCATION DETAILED: RIGHT RADIAL DORSAL HAND
LOCATION DETAILED: LEFT PROXIMAL POSTERIOR UPPER ARM
LOCATION DETAILED: RIGHT PROXIMAL POSTERIOR UPPER ARM
LOCATION DETAILED: SUPERIOR MID FOREHEAD
LOCATION DETAILED: STERNUM
LOCATION DETAILED: INFERIOR THORACIC SPINE

## 2023-01-12 ASSESSMENT — LOCATION ZONE DERM
LOCATION ZONE: FACE
LOCATION ZONE: TRUNK
LOCATION ZONE: ARM
LOCATION ZONE: LEG
LOCATION ZONE: HAND

## 2023-01-12 ASSESSMENT — LOCATION SIMPLE DESCRIPTION DERM
LOCATION SIMPLE: RIGHT CHEEK
LOCATION SIMPLE: CHEST
LOCATION SIMPLE: LEFT THIGH
LOCATION SIMPLE: LEFT FOREHEAD
LOCATION SIMPLE: UPPER BACK
LOCATION SIMPLE: LEFT CHEEK
LOCATION SIMPLE: LEFT HAND
LOCATION SIMPLE: RIGHT THIGH
LOCATION SIMPLE: RIGHT UPPER ARM
LOCATION SIMPLE: RIGHT HAND
LOCATION SIMPLE: SUPERIOR FOREHEAD
LOCATION SIMPLE: RIGHT UPPER BACK
LOCATION SIMPLE: LEFT UPPER ARM

## 2023-01-12 NOTE — PROCEDURE: MIPS QUALITY
Quality 138: Melanoma: Coordination Of Care: A treatment plan was communicated to the physicians providing continuing care within one month of diagnosis outlining: diagnosis, tumor thickness and a plan for surgery or alternate care.
Quality 110: Preventive Care And Screening: Influenza Immunization: Influenza Immunization Administered during Influenza season
Quality 226: Preventive Care And Screening: Tobacco Use: Screening And Cessation Intervention: Patient screened for tobacco use and is an ex/non-smoker
Quality 111:Pneumonia Vaccination Status For Older Adults: Pneumococcal vaccine (PPSV23) administered on or after patient’s 60th birthday and before the end of the measurement period
Quality 130: Documentation Of Current Medications In The Medical Record: Current Medications Documented
Quality 137: Melanoma: Continuity Of Care - Recall System: Patient information entered into a recall system that includes: target date for the next exam specified AND a process to follow up with patients regarding missed or unscheduled appointments
Quality 431: Preventive Care And Screening: Unhealthy Alcohol Use - Screening: Patient not identified as an unhealthy alcohol user when screened for unhealthy alcohol use using a systematic screening method
Detail Level: Detailed

## 2023-01-12 NOTE — PROCEDURE: LIQUID NITROGEN
Duration Of Freeze Thaw-Cycle (Seconds): 3
Show Aperture Variable?: Yes
Aperture Size (Optional): C
Application Tool (Optional): Cry-AC
Render Post-Care Instructions In Note?: no
Consent: The patient's consent was obtained including but not limited to risks of crusting, scabbing, blistering, scarring, darker or lighter pigmentary change, recurrence, incomplete removal and infection.
Post-Care Instructions: I reviewed with the patient in detail post-care instructions. Patient is to wear sunprotection, and avoid picking at any of the treated lesions. Pt may apply Vaseline to crusted or scabbing areas.
Number Of Freeze-Thaw Cycles: 1 freeze-thaw cycle
Detail Level: Detailed

## 2023-02-10 ENCOUNTER — HOSPITAL ENCOUNTER (OUTPATIENT)
Dept: CARDIOLOGY | Facility: MEDICAL CENTER | Age: 68
End: 2023-02-10
Attending: NURSE PRACTITIONER
Payer: COMMERCIAL

## 2023-02-10 DIAGNOSIS — Z95.1 S/P CABG (CORONARY ARTERY BYPASS GRAFT): ICD-10-CM

## 2023-02-10 DIAGNOSIS — I25.10 CORONARY ARTERY DISEASE INVOLVING NATIVE CORONARY ARTERY OF NATIVE HEART WITHOUT ANGINA PECTORIS: ICD-10-CM

## 2023-02-10 DIAGNOSIS — I10 ESSENTIAL HYPERTENSION, BENIGN: ICD-10-CM

## 2023-02-10 DIAGNOSIS — E78.5 DYSLIPIDEMIA: ICD-10-CM

## 2023-02-10 LAB
LV EJECT FRACT  99904: 60
LV EJECT FRACT MOD 2C 99903: 66.03
LV EJECT FRACT MOD 4C 99902: 57.64
LV EJECT FRACT MOD BP 99901: 59.65

## 2023-02-10 PROCEDURE — 93306 TTE W/DOPPLER COMPLETE: CPT

## 2023-02-10 PROCEDURE — 93306 TTE W/DOPPLER COMPLETE: CPT | Mod: 26 | Performed by: INTERNAL MEDICINE

## 2023-07-12 ENCOUNTER — APPOINTMENT (RX ONLY)
Dept: URBAN - METROPOLITAN AREA CLINIC 4 | Facility: CLINIC | Age: 68
Setting detail: DERMATOLOGY
End: 2023-07-12

## 2023-07-12 DIAGNOSIS — L81.4 OTHER MELANIN HYPERPIGMENTATION: ICD-10-CM

## 2023-07-12 DIAGNOSIS — Z85.820 PERSONAL HISTORY OF MALIGNANT MELANOMA OF SKIN: ICD-10-CM

## 2023-07-12 DIAGNOSIS — L82.1 OTHER SEBORRHEIC KERATOSIS: ICD-10-CM

## 2023-07-12 DIAGNOSIS — D22 MELANOCYTIC NEVI: ICD-10-CM

## 2023-07-12 DIAGNOSIS — L57.0 ACTINIC KERATOSIS: ICD-10-CM

## 2023-07-12 DIAGNOSIS — Z85.828 PERSONAL HISTORY OF OTHER MALIGNANT NEOPLASM OF SKIN: ICD-10-CM

## 2023-07-12 DIAGNOSIS — Z71.89 OTHER SPECIFIED COUNSELING: ICD-10-CM

## 2023-07-12 PROBLEM — D22.4 MELANOCYTIC NEVI OF SCALP AND NECK: Status: ACTIVE | Noted: 2023-07-12

## 2023-07-12 PROBLEM — D22.5 MELANOCYTIC NEVI OF TRUNK: Status: ACTIVE | Noted: 2023-07-12

## 2023-07-12 PROCEDURE — ? COUNSELING

## 2023-07-12 PROCEDURE — ? ADDITIONAL NOTES

## 2023-07-12 PROCEDURE — ? PRESCRIPTION

## 2023-07-12 PROCEDURE — ? SUNSCREEN RECOMMENDATIONS

## 2023-07-12 PROCEDURE — 99213 OFFICE O/P EST LOW 20 MIN: CPT

## 2023-07-12 PROCEDURE — ? DEFER

## 2023-07-12 PROCEDURE — ? PHOTO-DOCUMENTATION

## 2023-07-12 PROCEDURE — ? OBSERVATION

## 2023-07-12 RX ORDER — FLUOROURACIL 2 G/40G
1 CREAM TOPICAL BID
Qty: 40 | Refills: 0 | Status: ERX | COMMUNITY
Start: 2023-07-12

## 2023-07-12 RX ADMIN — FLUOROURACIL 1: 2 CREAM TOPICAL at 00:00

## 2023-07-12 ASSESSMENT — LOCATION ZONE DERM
LOCATION ZONE: LEG
LOCATION ZONE: HAND
LOCATION ZONE: FACE
LOCATION ZONE: NECK
LOCATION ZONE: TRUNK
LOCATION ZONE: ARM
LOCATION ZONE: SCALP

## 2023-07-12 ASSESSMENT — LOCATION SIMPLE DESCRIPTION DERM
LOCATION SIMPLE: NECK
LOCATION SIMPLE: LEFT UPPER BACK
LOCATION SIMPLE: RIGHT THIGH
LOCATION SIMPLE: RIGHT UPPER ARM
LOCATION SIMPLE: LEFT HAND
LOCATION SIMPLE: UPPER BACK
LOCATION SIMPLE: LEFT UPPER ARM
LOCATION SIMPLE: RIGHT HAND
LOCATION SIMPLE: CHEST
LOCATION SIMPLE: LEFT THIGH
LOCATION SIMPLE: SCALP
LOCATION SIMPLE: SUPERIOR FOREHEAD

## 2023-07-12 ASSESSMENT — LOCATION DETAILED DESCRIPTION DERM
LOCATION DETAILED: LEFT ULNAR DORSAL HAND
LOCATION DETAILED: SUPERIOR MID FOREHEAD
LOCATION DETAILED: LEFT SUPERIOR MEDIAL UPPER BACK
LOCATION DETAILED: LEFT ANTERIOR PROXIMAL THIGH
LOCATION DETAILED: LEFT ANTERIOR PROXIMAL UPPER ARM
LOCATION DETAILED: RIGHT ULNAR DORSAL HAND
LOCATION DETAILED: SUPERIOR THORACIC SPINE
LOCATION DETAILED: RIGHT SUPERIOR LATERAL NECK
LOCATION DETAILED: RIGHT ANTERIOR PROXIMAL UPPER ARM
LOCATION DETAILED: LEFT ANTERIOR DISTAL UPPER ARM
LOCATION DETAILED: RIGHT SUPERIOR PARIETAL SCALP
LOCATION DETAILED: RIGHT ANTERIOR PROXIMAL THIGH
LOCATION DETAILED: STERNUM

## 2023-07-12 NOTE — PROCEDURE: ADDITIONAL NOTES
Detail Level: Simple
Additional Notes: No change seen in photos today. Will continue to monitor.
Render Risk Assessment In Note?: no
Additional Notes: Long standing lesion (pt reports 10 years). Hx of trauma to the area no hx of progression. Will continue to monitor, rechecking at next visit and is scheduled.\\nSize is [id number] today.

## 2023-07-12 NOTE — PROCEDURE: MIPS QUALITY
Quality 137: Melanoma: Continuity Of Care - Recall System: Patient information entered into a recall system that includes: target date for the next exam specified AND a process to follow up with patients regarding missed or unscheduled appointments
Quality 130: Documentation Of Current Medications In The Medical Record: Current Medications Documented
Detail Level: Detailed
Quality 431: Preventive Care And Screening: Unhealthy Alcohol Use - Screening: Patient not identified as an unhealthy alcohol user when screened for unhealthy alcohol use using a systematic screening method
Quality 138: Melanoma: Coordination Of Care: A treatment plan was communicated to the physicians providing continuing care within one month of diagnosis outlining: diagnosis, tumor thickness and a plan for surgery or alternate care.
Quality 226: Preventive Care And Screening: Tobacco Use: Screening And Cessation Intervention: Patient screened for tobacco use and is an ex/non-smoker

## 2023-09-29 ENCOUNTER — OFFICE VISIT (OUTPATIENT)
Dept: CARDIOLOGY | Facility: MEDICAL CENTER | Age: 68
End: 2023-09-29
Attending: NURSE PRACTITIONER
Payer: COMMERCIAL

## 2023-09-29 VITALS
HEART RATE: 57 BPM | SYSTOLIC BLOOD PRESSURE: 120 MMHG | HEIGHT: 69 IN | WEIGHT: 216 LBS | DIASTOLIC BLOOD PRESSURE: 66 MMHG | OXYGEN SATURATION: 97 % | BODY MASS INDEX: 31.99 KG/M2 | RESPIRATION RATE: 16 BRPM

## 2023-09-29 DIAGNOSIS — Z02.4 ENCOUNTER FOR DEPARTMENT OF TRANSPORTATION (DOT) EXAMINATION FOR DRIVING LICENSE RENEWAL: ICD-10-CM

## 2023-09-29 DIAGNOSIS — I25.10 CORONARY ARTERY DISEASE INVOLVING NATIVE CORONARY ARTERY OF NATIVE HEART WITHOUT ANGINA PECTORIS: ICD-10-CM

## 2023-09-29 DIAGNOSIS — E78.5 DYSLIPIDEMIA: ICD-10-CM

## 2023-09-29 DIAGNOSIS — Z95.1 S/P CABG (CORONARY ARTERY BYPASS GRAFT): ICD-10-CM

## 2023-09-29 DIAGNOSIS — I10 ESSENTIAL HYPERTENSION, BENIGN: ICD-10-CM

## 2023-09-29 PROCEDURE — 3078F DIAST BP <80 MM HG: CPT | Performed by: NURSE PRACTITIONER

## 2023-09-29 PROCEDURE — 99214 OFFICE O/P EST MOD 30 MIN: CPT | Performed by: NURSE PRACTITIONER

## 2023-09-29 PROCEDURE — 3074F SYST BP LT 130 MM HG: CPT | Performed by: NURSE PRACTITIONER

## 2023-09-29 PROCEDURE — 99212 OFFICE O/P EST SF 10 MIN: CPT | Performed by: NURSE PRACTITIONER

## 2023-09-29 RX ORDER — FLUOROURACIL 50 MG/G
CREAM TOPICAL
COMMUNITY
Start: 2023-07-12

## 2023-09-29 RX ORDER — MELOXICAM 15 MG/1
TABLET ORAL
COMMUNITY
Start: 2023-08-08 | End: 2023-10-09

## 2023-09-29 ASSESSMENT — ENCOUNTER SYMPTOMS
ABDOMINAL PAIN: 0
DIZZINESS: 0
PALPITATIONS: 0
ORTHOPNEA: 0
MYALGIAS: 0
PND: 0
FEVER: 0
SHORTNESS OF BREATH: 0
BACK PAIN: 0
CLAUDICATION: 0
COUGH: 0

## 2023-09-29 ASSESSMENT — FIBROSIS 4 INDEX: FIB4 SCORE: 2.98

## 2023-09-29 NOTE — PROGRESS NOTES
No chief complaint on file.    Subjective:   Santi Landers is a 68 y.o. male who presents today for 12 month follow up for his coronary disease.     Hx of CAD with CABG X3 in '18, HLD, HTN, obesity, and chronic back pain.    He lives in [place], [place].     He is feeling overall great. He is still working as a  in [place], [place]. 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 He will need a stress test this year.    He has no other cardiac complaints. He has no chest pain, shortness of breath, edema, dizziness/lightheadedness, or palpitations.    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;  Service: Orthopedics    LUMBAR LAMINECTOMY DISKECTOMY Bilateral 6/29/2022    Procedure: LAMINECTOMY, SPINE, LUMBAR L1-L5 , L5-S1;  Surgeon: Karen Ceballos M.D.;  [place];  Service: Orthopedics    FORAMINOTOMY Bilateral 6/29/2022    Procedure: FORAMINOTOMY, SPINE;  Surgeon: Karen Ceballos M.D.;  [place];  Service: Orthopedics    MULTIPLE CORONARY ARTERY BYPASS ENDO VEIN HARVEST  10/24/2018    Procedure: MULTIPLE CORONARY ARTERY BYPASS x 3 ENDO VEIN HARVEST- RIGHT LEG, BILATERAL ANTERIOR MAMMARY ARTERY HARVEST;  Surgeon: Berhane Canela M.D.;  Location: [facility];  Service: Cardiac    LORENA  10/24/2018    Procedure: LORENA;  Surgeon: Berhane Canela M.D.;  Location: [facility];  Service: Cardiac    CHOLECYSTECTOMY  1989    ZZZ CARDIAC CATH       History reviewed. No pertinent family history.  Social History      Socioeconomic History    Marital status:      Spouse name: Not on file    Number of children: Not on file    Years of education: Not on file    Highest education level: Not on file   Occupational History    Not on file   Tobacco Use    Smoking status: Never    Smokeless tobacco: Never   Vaping Use    Vaping Use: Never used   Substance and Sexual Activity    Alcohol use: Yes     Comment: 3-4 DRINKS DAILY     Drug use: No    Sexual activity: Not on file   Other Topics Concern    Not on file   Social History Narrative    Not on file     Social Determinants of Health     Financial Resource Strain: Not on file   Food Insecurity: Not on file   Transportation Needs: Not on file   Physical Activity: Not on file   Stress: Not on file   Social Connections: Not on file   Intimate Partner Violence: Not on file   Housing Stability: Not on file     No Known Allergies  Outpatient Encounter Medications as of 9/29/2023   Medication Sig Dispense Refill    cyclobenzaprine (FLEXERIL) 10 mg Tab       aspirin EC (ECOTRIN) 81 MG Tablet Delayed Response Take 1 Tablet by mouth every day. 30 Tablet     tizanidine (ZANAFLEX) 2 MG tablet Take 1-2 Tablets by mouth 3 times a day as needed (spasm). 45 Tablet 0    hydroCHLOROthiazide (HYDRODIURIL) 25 MG Tab Take 25 mg by mouth every day.      atorvastatin (LIPITOR) 80 MG tablet Take 1 Tablet by mouth every evening.      metoprolol (LOPRESSOR) 50 MG Tab Take 1 Tab by mouth 2 times a day. 180 Tab 3    amLODIPine (NORVASC) 5 MG Tab Take 5 mg by mouth every evening. 90 Tab 3    fluorouracil (EFUDEX) 5 % cream       meloxicam (MOBIC) 15 MG tablet       [DISCONTINUED] gabapentin (NEURONTIN) 300 MG Cap Take 3 Capsules by mouth 3 times a day. (Patient not taking: Reported on 9/29/2023) 45 Capsule 0     No facility-administered encounter medications on file as of 9/29/2023.     Review of Systems   Constitutional:  Negative for fever and malaise/fatigue.   Respiratory:  Negative for cough and  "shortness of breath.    Cardiovascular:  Negative for chest pain, palpitations, orthopnea, claudication, leg swelling and PND.   Gastrointestinal:  Negative for abdominal pain.   Musculoskeletal:  Negative for back pain and myalgias.   Neurological:  Negative for dizziness.        Objective:   /66 (BP Location: Left arm, Patient Position: Sitting, BP Cuff Size: Adult)   Pulse (!) 57   Resp 16   Ht 1.753 m (5' 9\")   Wt 98 kg (216 lb)   SpO2 97%   BMI 31.90 kg/m²     Physical Exam  Vitals and nursing note reviewed.   Constitutional:       Appearance: Normal appearance. He is well-developed. He is obese.   HENT:      Head: Normocephalic and atraumatic.   Neck:      Vascular: No JVD.   Cardiovascular:      Rate and Rhythm: Normal rate and regular rhythm.      Pulses: Normal pulses.      Heart sounds: Normal heart sounds.   Pulmonary:      Effort: Pulmonary effort is normal.      Breath sounds: Normal breath sounds.   Musculoskeletal:         General: Normal range of motion.      Comments: Mild edema in R ankle   Skin:     General: Skin is warm and dry.      Capillary Refill: Capillary refill takes less than 2 seconds.   Neurological:      General: No focal deficit present.      Mental Status: He is alert and oriented to person, place, and time. Mental status is at baseline.   Psychiatric:         Mood and Affect: Mood normal.         Behavior: Behavior normal.         Thought Content: Thought content normal.         Judgment: Judgment normal.         Assessment:     1. Essential hypertension, benign  Cardiac Stress Test Treadmill Only    Cardiac Stress Test Treadmill Only      2. Dyslipidemia  Cardiac Stress Test Treadmill Only    Cardiac Stress Test Treadmill Only      3. Coronary artery disease involving native coronary artery of native heart without angina pectoris  Cardiac Stress Test Treadmill Only    Cardiac Stress Test Treadmill Only      4. S/P CABG (coronary artery bypass graft)  Cardiac Stress Test " Treadmill Only    Cardiac Stress Test Treadmill Only      5. Encounter for Department of Transportation (DOT) examination for driving license renewal  Cardiac Stress Test Treadmill Only    Cardiac Stress Test Treadmill Only        Medical Decision Making:  Today's Assessment / Status / Plan:     1. CAD S/P CABG X3 in '18  -no angina or JANG  -cont asa, statin, bb  -order echo for DOT clearance    2. HLD  -statin  -LDL goal <70 with CAD  -annual labs per PCP    3. HTN  -great control  -cont metoprolol 50 mg BID, HCTZ 25 mg QD, amlodipine 5 mg QPM  -follow at home and with PCP office  -BP goal <130/80    FU in clinic in 12 months with Darleen DILL in clinic with annual labs and stress imaging for DOT physical.

## 2023-09-29 NOTE — PATIENT INSTRUCTIONS
Obtain stress imaging NOW for DOT physical.    Stress imaging next August with follow up.    Obtain labs this year to check cholesterol.

## 2023-10-03 ENCOUNTER — HOSPITAL ENCOUNTER (OUTPATIENT)
Dept: RADIOLOGY | Facility: MEDICAL CENTER | Age: 68
End: 2023-10-03
Attending: NURSE PRACTITIONER
Payer: COMMERCIAL

## 2023-10-03 PROCEDURE — 93017 CV STRESS TEST TRACING ONLY: CPT

## 2023-10-03 PROCEDURE — 93018 CV STRESS TEST I&R ONLY: CPT | Performed by: STUDENT IN AN ORGANIZED HEALTH CARE EDUCATION/TRAINING PROGRAM

## 2023-10-05 ENCOUNTER — TELEPHONE (OUTPATIENT)
Dept: CARDIOLOGY | Facility: MEDICAL CENTER | Age: 68
End: 2023-10-05
Payer: COMMERCIAL

## 2023-10-05 ENCOUNTER — PATIENT MESSAGE (OUTPATIENT)
Dept: CARDIOLOGY | Facility: MEDICAL CENTER | Age: 68
End: 2023-10-05
Payer: COMMERCIAL

## 2023-10-05 NOTE — TELEPHONE ENCOUNTER
SC  Caller: Santi Landers     Topic/issue: Patient retuning phone call from [place] around 10 am 10/5/23 regarding test results 10/3/23, patient sates he is busy until 5pm , he has reviewed his results on my chart and states they looked clear , if any recommendations or further information is to be given a voice message or my chart would be adequate for communications.     Callback Number: [phone]      Thank you   Анна PETERSON

## 2023-10-05 NOTE — TELEPHONE ENCOUNTER
[employer] message sent to patient per patient request, awaiting patient response and will follow up as needed.

## 2023-10-05 NOTE — PATIENT COMMUNICATION
Darleen Edward, MAURICIO.P.R.N.   10/4/2023 11:33 AM PDT       Slightly abnormal stress imaging but could be false positive. This test was for DOT clearance only.     Can you order nuclear stress imaging?     Please check on symptoms and vitals with high BP during testing. SC     [employer] message sent to patient per patient request, awaiting patient response and will follow up as needed.

## 2023-11-07 NOTE — TELEPHONE ENCOUNTER
[place]    Pt calling to ask what is the status of the form for his job as a .    Please call , pt not working due to covid and should be able to receive your return call successfully this time.     The patient is a 73y Female complaining of dizziness.

## 2024-01-11 ENCOUNTER — APPOINTMENT (RX ONLY)
Dept: URBAN - METROPOLITAN AREA CLINIC 4 | Facility: CLINIC | Age: 69
Setting detail: DERMATOLOGY
End: 2024-01-11

## 2024-01-11 DIAGNOSIS — L81.4 OTHER MELANIN HYPERPIGMENTATION: ICD-10-CM

## 2024-01-11 DIAGNOSIS — Z71.89 OTHER SPECIFIED COUNSELING: ICD-10-CM

## 2024-01-11 DIAGNOSIS — Z85.820 PERSONAL HISTORY OF MALIGNANT MELANOMA OF SKIN: ICD-10-CM

## 2024-01-11 DIAGNOSIS — D22 MELANOCYTIC NEVI: ICD-10-CM

## 2024-01-11 DIAGNOSIS — Z85.828 PERSONAL HISTORY OF OTHER MALIGNANT NEOPLASM OF SKIN: ICD-10-CM

## 2024-01-11 DIAGNOSIS — L57.0 ACTINIC KERATOSIS: ICD-10-CM

## 2024-01-11 PROBLEM — D22.5 MELANOCYTIC NEVI OF TRUNK: Status: ACTIVE | Noted: 2024-01-11

## 2024-01-11 PROBLEM — D22.62 MELANOCYTIC NEVI OF LEFT UPPER LIMB, INCLUDING SHOULDER: Status: ACTIVE | Noted: 2024-01-11

## 2024-01-11 PROBLEM — D22.61 MELANOCYTIC NEVI OF RIGHT UPPER LIMB, INCLUDING SHOULDER: Status: ACTIVE | Noted: 2024-01-11

## 2024-01-11 PROCEDURE — 99213 OFFICE O/P EST LOW 20 MIN: CPT | Mod: 25

## 2024-01-11 PROCEDURE — ? COUNSELING

## 2024-01-11 PROCEDURE — 17000 DESTRUCT PREMALG LESION: CPT

## 2024-01-11 PROCEDURE — ? ADDITIONAL NOTES

## 2024-01-11 PROCEDURE — ? DEFER

## 2024-01-11 PROCEDURE — 17003 DESTRUCT PREMALG LES 2-14: CPT

## 2024-01-11 PROCEDURE — ? OBSERVATION

## 2024-01-11 PROCEDURE — ? SUNSCREEN RECOMMENDATIONS

## 2024-01-11 PROCEDURE — ? LIQUID NITROGEN

## 2024-01-11 PROCEDURE — ? PHOTO-DOCUMENTATION

## 2024-01-11 ASSESSMENT — LOCATION SIMPLE DESCRIPTION DERM
LOCATION SIMPLE: LEFT HAND
LOCATION SIMPLE: RIGHT UPPER ARM
LOCATION SIMPLE: LEFT UPPER ARM
LOCATION SIMPLE: RIGHT CHEEK
LOCATION SIMPLE: RIGHT HAND
LOCATION SIMPLE: CHEST
LOCATION SIMPLE: RIGHT ZYGOMA
LOCATION SIMPLE: SUPERIOR FOREHEAD
LOCATION SIMPLE: POSTERIOR SCALP
LOCATION SIMPLE: LEFT UPPER BACK

## 2024-01-11 ASSESSMENT — LOCATION ZONE DERM
LOCATION ZONE: SCALP
LOCATION ZONE: ARM
LOCATION ZONE: HAND
LOCATION ZONE: TRUNK
LOCATION ZONE: FACE

## 2024-01-11 ASSESSMENT — LOCATION DETAILED DESCRIPTION DERM
LOCATION DETAILED: RIGHT MEDIAL ZYGOMA
LOCATION DETAILED: LEFT PROXIMAL POSTERIOR UPPER ARM
LOCATION DETAILED: RIGHT PROXIMAL LATERAL POSTERIOR UPPER ARM
LOCATION DETAILED: LEFT ANTERIOR DISTAL UPPER ARM
LOCATION DETAILED: RIGHT CENTRAL BUCCAL CHEEK
LOCATION DETAILED: RIGHT POSTERIOR PARIETAL SCALP
LOCATION DETAILED: LEFT ULNAR DORSAL HAND
LOCATION DETAILED: LEFT SUPERIOR MEDIAL UPPER BACK
LOCATION DETAILED: RIGHT SUPERIOR CENTRAL MALAR CHEEK
LOCATION DETAILED: RIGHT PROXIMAL POSTERIOR UPPER ARM
LOCATION DETAILED: RIGHT SUPERIOR OCCIPITAL SCALP
LOCATION DETAILED: SUPERIOR MID FOREHEAD
LOCATION DETAILED: RIGHT ULNAR DORSAL HAND
LOCATION DETAILED: STERNUM

## 2024-01-11 NOTE — PROCEDURE: LIQUID NITROGEN
Post-Care Instructions: I reviewed with the patient in detail post-care instructions. Patient is to wear sunprotection, and avoid picking at any of the treated lesions. Pt may apply Vaseline to crusted or scabbing areas.
Duration Of Freeze Thaw-Cycle (Seconds): 3
Render Note In Bullet Format When Appropriate: No
Application Tool (Optional): Cry-AC
Show Applicator Variable?: Yes
Consent: The patient's consent was obtained including but not limited to risks of crusting, scabbing, blistering, scarring, darker or lighter pigmentary change, recurrence, incomplete removal and infection.
Aperture Size (Optional): C
Number Of Freeze-Thaw Cycles: 1 freeze-thaw cycle
Detail Level: Detailed

## 2024-01-11 NOTE — PROCEDURE: ADDITIONAL NOTES
Detail Level: Simple
Additional Notes: No change seen in photos today. Will continue to monitor.
Render Risk Assessment In Note?: no

## 2024-02-23 DIAGNOSIS — I25.10 CORONARY ARTERY DISEASE INVOLVING NATIVE CORONARY ARTERY OF NATIVE HEART WITHOUT ANGINA PECTORIS: ICD-10-CM

## 2024-05-07 LAB
CHOLEST SERPL-MCNC: 149 MG/DL
HDLC SERPL-MCNC: 58 MG/DL
LDLC SERPL CALC-MCNC: 58 MG/DL
TRIGL SERPL-MCNC: 163 MG/DL

## 2024-05-08 DIAGNOSIS — E78.5 DYSLIPIDEMIA: ICD-10-CM

## 2024-05-10 ENCOUNTER — TELEPHONE (OUTPATIENT)
Dept: CARDIOLOGY | Facility: MEDICAL CENTER | Age: 69
End: 2024-05-10
Payer: COMMERCIAL

## 2024-05-10 NOTE — TELEPHONE ENCOUNTER
Phone Number Called: [phone]    Call outcome: Left detailed message for patient. Informed to call back with any additional questions.    Message: Left message with lab results. Call back number provided for further questions.

## 2024-05-10 NOTE — TELEPHONE ENCOUNTER
----- Message from JUVENCIO Blanco sent at 5/9/2024  9:12 AM PDT -----  Labs look good. Recommend PCP review slight abnormalities in glucose, triglycerides, and chloride. SC  ----- Message -----  From: Rossy Bender R.N.  Sent: 5/8/2024   4:23 PM PDT  To: JUVENCIO Blanco    To SC: updated labs (labs not WDL= chloride 97; glucose 105; triglyceride 163); thank you!

## 2024-05-24 NOTE — TELEPHONE ENCOUNTER
SC    Caller: Santi Landers    Topic/issue: PT is returning call from Rossy in regards to his blood sugar, he did get a finger prick at his PCP on 05/22 and would like to discuss.     Callback Number: [phone]    Thank you,   Michelle RIVERA

## 2024-05-24 NOTE — TELEPHONE ENCOUNTER
Phone Number Called: [phone]    Call outcome: Spoke to patient regarding message below.    Message: Patient reports that A1C is 5.2 at this time and that he did follow up with PCP in regard to elevated glucose. Per PCP no concerns at this time. All questions answered at this time. Advised to call back with any further questions or concerns.

## 2024-08-29 ENCOUNTER — OFFICE VISIT (OUTPATIENT)
Dept: CARDIOLOGY | Facility: MEDICAL CENTER | Age: 69
End: 2024-08-29
Attending: NURSE PRACTITIONER
Payer: COMMERCIAL

## 2024-08-29 VITALS
HEART RATE: 80 BPM | OXYGEN SATURATION: 96 % | BODY MASS INDEX: 31.7 KG/M2 | HEIGHT: 69 IN | SYSTOLIC BLOOD PRESSURE: 134 MMHG | WEIGHT: 214 LBS | DIASTOLIC BLOOD PRESSURE: 68 MMHG

## 2024-08-29 DIAGNOSIS — I10 ESSENTIAL HYPERTENSION, BENIGN: ICD-10-CM

## 2024-08-29 DIAGNOSIS — I25.10 CORONARY ARTERY DISEASE INVOLVING NATIVE CORONARY ARTERY OF NATIVE HEART WITHOUT ANGINA PECTORIS: ICD-10-CM

## 2024-08-29 DIAGNOSIS — E78.5 DYSLIPIDEMIA: ICD-10-CM

## 2024-08-29 DIAGNOSIS — Z95.1 S/P CABG (CORONARY ARTERY BYPASS GRAFT): ICD-10-CM

## 2024-08-29 PROCEDURE — 99212 OFFICE O/P EST SF 10 MIN: CPT | Performed by: NURSE PRACTITIONER

## 2024-08-29 ASSESSMENT — ENCOUNTER SYMPTOMS
PALPITATIONS: 0
CLAUDICATION: 0
MYALGIAS: 0
COUGH: 0
DIZZINESS: 0
SHORTNESS OF BREATH: 0
BACK PAIN: 0
FEVER: 0
PND: 0
ABDOMINAL PAIN: 0
ORTHOPNEA: 0

## 2024-08-29 NOTE — PROGRESS NOTES
Chief Complaint   Patient presents with    Hypertension    Coronary Artery Disease     F/V Dx: Coronary artery disease involving native coronary artery of native heart without angina pectoris    Dyslipidemia     Subjective:   Santi Landers is a 68 y.o. male who presents today for 12 month follow up for his coronary disease.     Hx of CAD with CABG X3 in '18, HLD, HTN, obesity, and chronic back pain.    He lives in [place], [place].     He is feeling overall great. He is still working as a  in [place], [place]. His DOT paperwork needs to be completed, he will fax this over to you.    He has no other cardiac complaints. He has no chest pain, shortness of breath, edema, dizziness/lightheadedness, or palpitations.    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;  Location: [facility];  Service: Orthopedics    FORAMINOTOMY Bilateral 6/29/2022    Procedure: FORAMINOTOMY, SPINE;  Surgeon: Karen Ceballos M.D.;  Location: [facility];  Service: Orthopedics    MULTIPLE CORONARY ARTERY BYPASS ENDO VEIN HARVEST  10/24/2018    Procedure: MULTIPLE CORONARY ARTERY BYPASS x 3 ENDO VEIN HARVEST- RIGHT LEG, BILATERAL ANTERIOR MAMMARY ARTERY HARVEST;  Surgeon: Berhane Canela M.D.;  Location: [facility];  Service: Cardiac    LORENA  10/24/2018    Procedure: LORENA;  Surgeon: Berhane Canela M.D.;  Location:  [facility];  Service: Cardiac    CHOLECYSTECTOMY      [facility] CARDIAC CATH       History reviewed. No pertinent family history.  Social History     Socioeconomic History    Marital status:      Spouse name: Not on file    Number of children: Not on file    Years of education: Not on file    Highest education level: Not on file   Occupational History    Not on file   Tobacco Use    Smoking status: Never    Smokeless tobacco: Never   Vaping Use    Vaping status: Never Used   Substance and Sexual Activity    Alcohol use: Yes     Comment: 3-4 DRINKS DAILY     Drug use: No    Sexual activity: Not on file   Other Topics Concern    Not on file   Social History Narrative    Not on file     Social Determinants of Health     Financial Resource Strain: Not on File (2019)    Received from [facility]    Financial Resource Strain     Financial Resource Strain: 0   Food Insecurity: Not on File (2019)    Received from [facility]    Food Insecurity     Food: 0   Transportation Needs: Not on File (2019)    Received from [facility]    Transportation Needs     Transportation: 0   Physical Activity: Not on File (2019)    Received from [facility]    Physical Activity     Physical Activity: 0   Stress: Not on File (2019)    Received from [facility]    Stress     Stress: 0   Social Connections: Not on File (2019)    Received from [facility]    Social Connections     Social Connections and Isolation: 0   Intimate Partner Violence: Not on file   Housing Stability: Not on File (2019)    Received from [facility]    Housing Stability     Housin     No Known Allergies  Outpatient Encounter Medications as of 2024   Medication Sig Dispense Refill    meloxicam (MOBIC) 15 MG tablet TAKE 1 TABLET WITH BREAKFAST AS NEEDED FOR PAIN FOR 14 DAYS, THEN AS NEEDED. NO ADVIL/ALEVE/MOTRIN/IBUPROFEN ON SAME DAY 60 Tablet 5    cyclobenzaprine (FLEXERIL) 10 mg Tab       aspirin EC  "(ECOTRIN) 81 MG Tablet Delayed Response Take 1 Tablet by mouth every day. 30 Tablet     hydroCHLOROthiazide (HYDRODIURIL) 25 MG Tab Take 25 mg by mouth every day.      atorvastatin (LIPITOR) 80 MG tablet Take 1 Tablet by mouth every evening.      metoprolol (LOPRESSOR) 50 MG Tab Take 1 Tab by mouth 2 times a day. 180 Tab 3    amLODIPine (NORVASC) 5 MG Tab Take 5 mg by mouth every evening. 90 Tab 3    [DISCONTINUED] fluorouracil (EFUDEX) 5 % cream  (Patient not taking: Reported on 8/29/2024)      tizanidine (ZANAFLEX) 2 MG tablet Take 1-2 Tablets by mouth 3 times a day as needed (spasm). 45 Tablet 0     No facility-administered encounter medications on file as of 8/29/2024.     Review of Systems   Constitutional:  Negative for fever and malaise/fatigue.   Respiratory:  Negative for cough and shortness of breath.    Cardiovascular:  Negative for chest pain, palpitations, orthopnea, claudication, leg swelling and PND.   Gastrointestinal:  Negative for abdominal pain.   Musculoskeletal:  Negative for back pain and myalgias.   Neurological:  Negative for dizziness.        Objective:   /68 (BP Location: Left arm, Patient Position: Sitting, BP Cuff Size: Adult)   Pulse 80   Ht 1.753 m (5' 9\")   Wt 97.1 kg (214 lb)   SpO2 96%   BMI 31.60 kg/m²     Physical Exam  Vitals and nursing note reviewed.   Constitutional:       Appearance: Normal appearance. He is well-developed. He is obese.   HENT:      Head: Normocephalic and atraumatic.   Neck:      Vascular: No JVD.   Cardiovascular:      Rate and Rhythm: Normal rate and regular rhythm.      Pulses: Normal pulses.      Heart sounds: Normal heart sounds.   Pulmonary:      Effort: Pulmonary effort is normal.      Breath sounds: Normal breath sounds.   Musculoskeletal:         General: Normal range of motion.      Comments: Mild edema in R ankle   Skin:     General: Skin is warm and dry.      Capillary Refill: Capillary refill takes less than 2 seconds.   Neurological: "      General: No focal deficit present.      Mental Status: He is alert and oriented to person, place, and time. Mental status is at baseline.   Psychiatric:         Mood and Affect: Mood normal.         Behavior: Behavior normal.         Thought Content: Thought content normal.         Judgment: Judgment normal.         Assessment:     1. Coronary artery disease involving native coronary artery of native heart without angina pectoris        2. Dyslipidemia        3. Essential hypertension, benign        4. S/P CABG (coronary artery bypass graft)          Medical Decision Making:  Today's Assessment / Status / Plan:     1. CAD S/P CABG X3 in '18  -no angina or JANG  -cont asa, statin, bb  -wait for need for further testing, he will let us know    2. HLD  -statin  -LDL goal <70 with CAD  -annual labs per PCP reviewed, within goal    3. HTN  -great control  -cont metoprolol 50 mg BID, HCTZ 25 mg QD, amlodipine 5 mg QPM  -follow at home and with PCP office  -BP goal <130/80    FU in clinic in 12 months with Darleen DILL in clinic with annual labs.

## 2024-09-04 ENCOUNTER — PATIENT MESSAGE (OUTPATIENT)
Dept: CARDIOLOGY | Facility: MEDICAL CENTER | Age: 69
End: 2024-09-04
Payer: COMMERCIAL

## 2024-09-05 ENCOUNTER — PATIENT MESSAGE (OUTPATIENT)
Dept: CARDIOLOGY | Facility: MEDICAL CENTER | Age: 69
End: 2024-09-05
Payer: COMMERCIAL

## 2024-09-06 ENCOUNTER — PATIENT MESSAGE (OUTPATIENT)
Dept: CARDIOLOGY | Facility: MEDICAL CENTER | Age: 69
End: 2024-09-06
Payer: COMMERCIAL

## 2024-09-19 ENCOUNTER — APPOINTMENT (RX ONLY)
Dept: URBAN - METROPOLITAN AREA CLINIC 4 | Facility: CLINIC | Age: 69
Setting detail: DERMATOLOGY
End: 2024-09-19

## 2024-09-19 DIAGNOSIS — Z85.828 PERSONAL HISTORY OF OTHER MALIGNANT NEOPLASM OF SKIN: ICD-10-CM

## 2024-09-19 DIAGNOSIS — L82.1 OTHER SEBORRHEIC KERATOSIS: ICD-10-CM

## 2024-09-19 DIAGNOSIS — L57.0 ACTINIC KERATOSIS: ICD-10-CM

## 2024-09-19 DIAGNOSIS — L81.4 OTHER MELANIN HYPERPIGMENTATION: ICD-10-CM

## 2024-09-19 DIAGNOSIS — Z71.89 OTHER SPECIFIED COUNSELING: ICD-10-CM

## 2024-09-19 DIAGNOSIS — D22 MELANOCYTIC NEVI: ICD-10-CM

## 2024-09-19 DIAGNOSIS — Z85.820 PERSONAL HISTORY OF MALIGNANT MELANOMA OF SKIN: ICD-10-CM

## 2024-09-19 DIAGNOSIS — D49.2 NEOPLASM OF UNSPECIFIED BEHAVIOR OF BONE, SOFT TISSUE, AND SKIN: ICD-10-CM

## 2024-09-19 PROBLEM — D22.61 MELANOCYTIC NEVI OF RIGHT UPPER LIMB, INCLUDING SHOULDER: Status: ACTIVE | Noted: 2024-09-19

## 2024-09-19 PROBLEM — D22.62 MELANOCYTIC NEVI OF LEFT UPPER LIMB, INCLUDING SHOULDER: Status: ACTIVE | Noted: 2024-09-19

## 2024-09-19 PROBLEM — D22.5 MELANOCYTIC NEVI OF TRUNK: Status: ACTIVE | Noted: 2024-09-19

## 2024-09-19 PROCEDURE — 11102 TANGNTL BX SKIN SINGLE LES: CPT

## 2024-09-19 PROCEDURE — ? OBSERVATION

## 2024-09-19 PROCEDURE — 17000 DESTRUCT PREMALG LESION: CPT | Mod: 59

## 2024-09-19 PROCEDURE — ? LIQUID NITROGEN

## 2024-09-19 PROCEDURE — ? COUNSELING

## 2024-09-19 PROCEDURE — 17003 DESTRUCT PREMALG LES 2-14: CPT

## 2024-09-19 PROCEDURE — ? SUNSCREEN RECOMMENDATIONS

## 2024-09-19 PROCEDURE — 99213 OFFICE O/P EST LOW 20 MIN: CPT | Mod: 25

## 2024-09-19 PROCEDURE — ? BIOPSY BY SHAVE METHOD

## 2024-09-19 ASSESSMENT — LOCATION DETAILED DESCRIPTION DERM
LOCATION DETAILED: RIGHT CENTRAL MALAR CHEEK
LOCATION DETAILED: RIGHT ANTERIOR EARLOBE
LOCATION DETAILED: LEFT DISTAL POSTERIOR UPPER ARM
LOCATION DETAILED: LEFT PROXIMAL POSTERIOR UPPER ARM
LOCATION DETAILED: RIGHT CENTRAL FRONTAL SCALP
LOCATION DETAILED: RIGHT PROXIMAL POSTERIOR UPPER ARM
LOCATION DETAILED: SUPERIOR MID FOREHEAD
LOCATION DETAILED: LEFT SUPERIOR CENTRAL MALAR CHEEK
LOCATION DETAILED: LEFT SUPERIOR MEDIAL UPPER BACK
LOCATION DETAILED: LEFT ULNAR DORSAL HAND
LOCATION DETAILED: LEFT ANTERIOR DISTAL UPPER ARM
LOCATION DETAILED: LEFT CENTRAL TEMPLE
LOCATION DETAILED: STERNUM
LOCATION DETAILED: RIGHT ULNAR DORSAL HAND

## 2024-09-19 ASSESSMENT — LOCATION SIMPLE DESCRIPTION DERM
LOCATION SIMPLE: CHEST
LOCATION SIMPLE: RIGHT SCALP
LOCATION SIMPLE: LEFT UPPER ARM
LOCATION SIMPLE: SUPERIOR FOREHEAD
LOCATION SIMPLE: LEFT HAND
LOCATION SIMPLE: RIGHT HAND
LOCATION SIMPLE: RIGHT EAR
LOCATION SIMPLE: LEFT CHEEK
LOCATION SIMPLE: LEFT UPPER BACK
LOCATION SIMPLE: LEFT TEMPLE
LOCATION SIMPLE: RIGHT CHEEK
LOCATION SIMPLE: RIGHT UPPER ARM

## 2024-09-19 ASSESSMENT — LOCATION ZONE DERM
LOCATION ZONE: ARM
LOCATION ZONE: FACE
LOCATION ZONE: EAR
LOCATION ZONE: SCALP
LOCATION ZONE: HAND
LOCATION ZONE: TRUNK

## 2024-09-19 NOTE — PROCEDURE: LIQUID NITROGEN
Aperture Size (Optional): C
Render Post-Care Instructions In Note?: no
Duration Of Freeze Thaw-Cycle (Seconds): 3
Number Of Freeze-Thaw Cycles: 1 freeze-thaw cycle
Post-Care Instructions: I reviewed with the patient in detail post-care instructions. Patient is to wear sunprotection, and avoid picking at any of the treated lesions. Pt may apply Vaseline to crusted or scabbing areas.
Show Applicator Variable?: Yes
Application Tool (Optional): Cry-AC
Detail Level: Detailed
Consent: The patient's consent was obtained including but not limited to risks of crusting, scabbing, blistering, scarring, darker or lighter pigmentary change, recurrence, incomplete removal and infection.

## 2024-10-07 ENCOUNTER — APPOINTMENT (RX ONLY)
Dept: URBAN - METROPOLITAN AREA CLINIC 4 | Facility: CLINIC | Age: 69
Setting detail: DERMATOLOGY
End: 2024-10-07

## 2024-10-07 DIAGNOSIS — Z71.89 OTHER SPECIFIED COUNSELING: ICD-10-CM

## 2024-10-07 PROBLEM — C43.59 MALIGNANT MELANOMA OF OTHER PART OF TRUNK: Status: ACTIVE | Noted: 2024-10-07

## 2024-10-07 PROCEDURE — ? EXCISION

## 2024-10-07 PROCEDURE — 11606 EXC TR-EXT MAL+MARG >4 CM: CPT

## 2024-10-07 PROCEDURE — ? SUNSCREEN RECOMMENDATIONS

## 2024-10-07 PROCEDURE — 13102 CMPLX RPR TRUNK ADDL 5CM/<: CPT

## 2024-10-07 PROCEDURE — 13101 CMPLX RPR TRUNK 2.6-7.5 CM: CPT

## 2024-10-07 PROCEDURE — 99212 OFFICE O/P EST SF 10 MIN: CPT | Mod: 25

## 2024-10-07 ASSESSMENT — LOCATION DETAILED DESCRIPTION DERM
LOCATION DETAILED: RIGHT ULNAR DORSAL HAND
LOCATION DETAILED: LEFT ULNAR DORSAL HAND

## 2024-10-07 ASSESSMENT — LOCATION SIMPLE DESCRIPTION DERM
LOCATION SIMPLE: RIGHT HAND
LOCATION SIMPLE: LEFT HAND

## 2024-10-07 ASSESSMENT — LOCATION ZONE DERM: LOCATION ZONE: HAND

## 2024-10-07 NOTE — PROCEDURE: EXCISION
Referring Physician (Optional): JACOB Amaral
Surgeon (Optional): Nils
Biopsy Photograph Reviewed: Yes
Previous Accession (Optional): [id number]
Date Of Previous Biopsy (Optional): 9/19/24
Breslow Depth: 0.4
Size Of Lesion In Cm: 3.1
Size Of Margin In Cm: 1
Anesthesia Volume In Cc: 23.3
Was An Eye Clamp Used?: No
Eye Clamp Note Details: An eye clamp was used during the procedure.
Excision Method: Elliptical
Saucerization Depth: dermis and superficial adipose tissue
Repair Type: Complex
Intermediate / Complex Repair - Final Wound Length In Cm: 10
Suturegard Retention Suture: 2-0 Nylon
Retention Suture Bite Size: 3 mm
Width Of Defect Perpendicular To Closure In Cm (Required): 3.4
Distance Of Undermining In Cm (Required): 4.2
Undermining Type: Entire Wound
Debridement Text: The wound edges were debrided prior to proceeding with the closure to facilitate wound healing.
Helical Rim Text: The closure involved the helical rim.
Vermilion Border Text: The closure involved the vermilion border.
Nostril Rim Text: The closure involved the nostril rim.
Retention Suture Text: Retention sutures were placed to support the closure and prevent dehiscence.
Primary Defect Length (In Cm): 0
Lab: [id number]
Lab Facility: 
Graft Donor Site Bandage (Optional-Leave Blank If You Don't Want In Note): Steri-strips and a pressure bandage were applied to the donor site.
Epidermal Closure Graft Donor Site (Optional): simple interrupted
Billing Type: Third-Party Bill
Excision Depth: adipose tissue
Scalpel Size: 15 blade
Anesthesia Type: 1% lidocaine with epinephrine
Additional Anesthesia Volume In Cc: 6
Hemostasis: Electrocautery
Estimated Blood Loss (Cc): minimal
Detail Level: Detailed
Repair Depth: use same depth as excision depth
Repair Anesthesia Method: local infiltration
Anesthesia Volume In Cc: 21.5
Deep Sutures: 2-0 Vicryl
Dermal Closure: buried vertical mattress
Epidermal Sutures: 4-0 Vicryl Rapide
Epidermal Closure: running locked
Wound Care: Bacitracin
Dressing: dry sterile dressing
Suturegard Intro: Intraoperative tissue expansion was performed, utilizing the SUTUREGARD device, in order to reduce wound tension.
Suturegard Body: The suture ends were repeatedly re-tightened and re-clamped to achieve the desired tissue expansion.
Hemigard Intro: Due to skin fragility and wound tension, it was decided to use HEMIGARD adhesive retention suture devices to permit a linear closure. The skin was cleaned and dried for a 6cm distance away from the wound. Excessive hair, if present, was removed to allow for adhesion.
Hemigard Postcare Instructions: The HEMIGARD strips are to remain completely dry for at least 5-7 days.
Positioning (Leave Blank If You Do Not Want): The patient was placed in a comfortable position exposing the surgical site.
Pre-Excision Curettage Text (Leave Blank If You Do Not Want): Prior to drawing the surgical margin the visible lesion was removed with electrodesiccation and curettage to clearly define the lesion size.
Complex Repair Preamble Text (Leave Blank If You Do Not Want): Extensive wide undermining was performed.
Intermediate Repair Preamble Text (Leave Blank If You Do Not Want): Undermining was performed with blunt dissection.
Curvilinear Excision Additional Text (Leave Blank If You Do Not Want): The margin was drawn around the clinically apparent lesion.  A curvilinear shape was then drawn on the skin incorporating the lesion and margins.  Incisions were then made along these lines to the appropriate tissue plane and the lesion was extirpated.
Fusiform Excision Additional Text (Leave Blank If You Do Not Want): The margin was drawn around the clinically apparent lesion.  A fusiform shape was then drawn on the skin incorporating the lesion and margins.  Incisions were then made along these lines to the appropriate tissue plane and the lesion was extirpated.
Elliptical Excision Additional Text (Leave Blank If You Do Not Want): The margin was drawn around the clinically apparent lesion.  An elliptical shape was then drawn on the skin incorporating the lesion and margins.  Incisions were then made along these lines to the appropriate tissue plane and the lesion was extirpated.
Saucerization Excision Additional Text (Leave Blank If You Do Not Want): The margin was drawn around the clinically apparent lesion.  Incisions were then made along these lines, in a tangential fashion, to the appropriate tissue plane and the lesion was extirpated.
Slit Excision Additional Text (Leave Blank If You Do Not Want): A linear line was drawn on the skin overlying the lesion. An incision was made slowly until the lesion was visualized.  Once visualized, the lesion was removed with blunt dissection.
Excisional Biopsy Additional Text (Leave Blank If You Do Not Want): The margin was drawn around the clinically apparent lesion. An elliptical shape was then drawn on the skin incorporating the lesion and margins.  Incisions were then made along these lines to the appropriate tissue plane and the lesion was extirpated.
Perilesional Excision Additional Text (Leave Blank If You Do Not Want): The margin was drawn around the clinically apparent lesion. Incisions were then made along these lines to the appropriate tissue plane and the lesion was extirpated.
Repair Performed By Another Provider Text (Leave Blank If You Do Not Want): After the tissue was excised the defect was repaired by another provider.
No Repair - Repaired With Adjacent Surgical Defect Text (Leave Blank If You Do Not Want): After the excision the defect was repaired concurrently with another surgical defect which was in close approximation.
Adjacent Tissue Transfer Text: The defect edges were debeveled with a #15 scalpel blade. Given the location of the defect and the proximity to free margins an adjacent tissue transfer was deemed most appropriate. Using a sterile surgical marker, an appropriate flap was drawn incorporating the defect and placing the expected incisions within the relaxed skin tension lines where possible. The area thus outlined was incised deep to adipose tissue with a #15 scalpel blade. The skin margins were undermined to an appropriate distance in all directions utilizing iris scissors and carried over to close the primary defect.
Advancement Flap (Single) Text: The defect edges were debeveled with a #15 scalpel blade.  Given the location of the defect and the proximity to free margins a single advancement flap was deemed most appropriate.  Using a sterile surgical marker, an appropriate advancement flap was drawn incorporating the defect and placing the expected incisions within the relaxed skin tension lines where possible.    The area thus outlined was incised deep to adipose tissue with a #15 scalpel blade.  The skin margins were undermined to an appropriate distance in all directions utilizing iris scissors.
Advancement Flap (Double) Text: The defect edges were debeveled with a #15 scalpel blade.  Given the location of the defect and the proximity to free margins a double advancement flap was deemed most appropriate.  Using a sterile surgical marker, the appropriate advancement flaps were drawn incorporating the defect and placing the expected incisions within the relaxed skin tension lines where possible.    The area thus outlined was incised deep to adipose tissue with a #15 scalpel blade.  The skin margins were undermined to an appropriate distance in all directions utilizing iris scissors.
Burow's Advancement Flap Text: The defect edges were debeveled with a #15 scalpel blade.  Given the location of the defect and the proximity to free margins a Burow's advancement flap was deemed most appropriate.  Using a sterile surgical marker, the appropriate advancement flap was drawn incorporating the defect and placing the expected incisions within the relaxed skin tension lines where possible.    The area thus outlined was incised deep to adipose tissue with a #15 scalpel blade.  The skin margins were undermined to an appropriate distance in all directions utilizing iris scissors.
Chonodrocutaneous Helical Advancement Flap Text: The defect edges were debeveled with a #15 scalpel blade. Given the location of the defect and the proximity to free margins a chondrocutaneous helical advancement flap was deemed most appropriate. Using a sterile surgical marker, the appropriate advancement flap was drawn incorporating the defect and placing the expected incisions within the relaxed skin tension lines where possible. The area thus outlined was incised deep to adipose tissue with a #15 scalpel blade. The skin margins were undermined to an appropriate distance in all directions utilizing iris scissors. Following this, the designed flap was advanced and carried over into the primary defect and sutured into place.
Crescentic Advancement Flap Text: The defect edges were debeveled with a #15 scalpel blade.  Given the location of the defect and the proximity to free margins a crescentic advancement flap was deemed most appropriate.  Using a sterile surgical marker, the appropriate advancement flap was drawn incorporating the defect and placing the expected incisions within the relaxed skin tension lines where possible.    The area thus outlined was incised deep to adipose tissue with a #15 scalpel blade.  The skin margins were undermined to an appropriate distance in all directions utilizing iris scissors.
A-T Advancement Flap Text: The defect edges were debeveled with a #15 scalpel blade.  Given the location of the defect, shape of the defect and the proximity to free margins an A-T advancement flap was deemed most appropriate.  Using a sterile surgical marker, an appropriate advancement flap was drawn incorporating the defect and placing the expected incisions within the relaxed skin tension lines where possible.    The area thus outlined was incised deep to adipose tissue with a #15 scalpel blade.  The skin margins were undermined to an appropriate distance in all directions utilizing iris scissors.
O-T Advancement Flap Text: The defect edges were debeveled with a #15 scalpel blade.  Given the location of the defect, shape of the defect and the proximity to free margins an O-T advancement flap was deemed most appropriate.  Using a sterile surgical marker, an appropriate advancement flap was drawn incorporating the defect and placing the expected incisions within the relaxed skin tension lines where possible.    The area thus outlined was incised deep to adipose tissue with a #15 scalpel blade.  The skin margins were undermined to an appropriate distance in all directions utilizing iris scissors.
O-L Flap Text: The defect edges were debeveled with a #15 scalpel blade.  Given the location of the defect, shape of the defect and the proximity to free margins an O-L flap was deemed most appropriate.  Using a sterile surgical marker, an appropriate advancement flap was drawn incorporating the defect and placing the expected incisions within the relaxed skin tension lines where possible.    The area thus outlined was incised deep to adipose tissue with a #15 scalpel blade.  The skin margins were undermined to an appropriate distance in all directions utilizing iris scissors.
O-Z Flap Text: The defect edges were debeveled with a #15 scalpel blade. Given the location of the defect, shape of the defect and the proximity to free margins an O-Z flap was deemed most appropriate. Using a sterile surgical marker, an appropriate transposition flap was drawn incorporating the defect and placing the expected incisions within the relaxed skin tension lines where possible. The area thus outlined was incised deep to adipose tissue with a #15 scalpel blade. The skin margins were undermined to an appropriate distance in all directions utilizing iris scissors. Following this, the designed flap was carried over into the primary defect and sutured into place.
Double O-Z Flap Text: The defect edges were debeveled with a #15 scalpel blade. Given the location of the defect, shape of the defect and the proximity to free margins a Double O-Z flap was deemed most appropriate. Using a sterile surgical marker, an appropriate transposition flap was drawn incorporating the defect and placing the expected incisions within the relaxed skin tension lines where possible. The area thus outlined was incised deep to adipose tissue with a #15 scalpel blade. The skin margins were undermined to an appropriate distance in all directions utilizing iris scissors. Following this, the designed flap was carried over into the primary defect and sutured into place.
V-Y Flap Text: The defect edges were debeveled with a #15 scalpel blade.  Given the location of the defect, shape of the defect and the proximity to free margins a V-Y flap was deemed most appropriate.  Using a sterile surgical marker, an appropriate advancement flap was drawn incorporating the defect and placing the expected incisions within the relaxed skin tension lines where possible.    The area thus outlined was incised deep to adipose tissue with a #15 scalpel blade.  The skin margins were undermined to an appropriate distance in all directions utilizing iris scissors.
Advancement-Rotation Flap Text: The defect edges were debeveled with a #15 scalpel blade.  Given the location of the defect, shape of the defect and the proximity to free margins an advancement-rotation flap was deemed most appropriate.  Using a sterile surgical marker, an appropriate flap was drawn incorporating the defect and placing the expected incisions within the relaxed skin tension lines where possible. The area thus outlined was incised deep to adipose tissue with a #15 scalpel blade.  The skin margins were undermined to an appropriate distance in all directions utilizing iris scissors.
Mercedes Flap Text: The defect edges were debeveled with a #15 scalpel blade. Given the location of the defect, shape of the defect and the proximity to free margins a Mercedes flap was deemed most appropriate. Using a sterile surgical marker, an appropriate advancement flap was drawn incorporating the defect and placing the expected incisions within the relaxed skin tension lines where possible. The area thus outlined was incised deep to adipose tissue with a #15 scalpel blade. The skin margins were undermined to an appropriate distance in all directions utilizing iris scissors. Following this, the designed flap was advanced and carried over into the primary defect and sutured into place.
Modified Advancement Flap Text: The defect edges were debeveled with a #15 scalpel blade.  Given the location of the defect, shape of the defect and the proximity to free margins a modified advancement flap was deemed most appropriate.  Using a sterile surgical marker, an appropriate advancement flap was drawn incorporating the defect and placing the expected incisions within the relaxed skin tension lines where possible.    The area thus outlined was incised deep to adipose tissue with a #15 scalpel blade.  The skin margins were undermined to an appropriate distance in all directions utilizing iris scissors.
Mucosal Advancement Flap Text: Given the location of the defect, shape of the defect and the proximity to free margins a mucosal advancement flap was deemed most appropriate. Incisions were made with a 15 blade scalpel in the appropriate fashion along the cutaneous vermilion border and the mucosal lip. The remaining actinically damaged mucosal tissue was excised.  The mucosal advancement flap was then elevated to the gingival sulcus with care taken to preserve the neurovascular structures and advanced into the primary defect. Care was taken to ensure that precise realignment of the vermilion border was achieved.
Peng Advancement Flap Text: The defect edges were debeveled with a #15 scalpel blade. Given the location of the defect, shape of the defect and the proximity to free margins a Peng advancement flap was deemed most appropriate. Using a sterile surgical marker, an appropriate advancement flap was drawn incorporating the defect and placing the expected incisions within the relaxed skin tension lines where possible. The area thus outlined was incised deep to adipose tissue with a #15 scalpel blade. The skin margins were undermined to an appropriate distance in all directions utilizing iris scissors. Following this, the designed flap was advanced and carried over into the primary defect and sutured into place.
Hatchet Flap Text: The defect edges were debeveled with a #15 scalpel blade.  Given the location of the defect, shape of the defect and the proximity to free margins a hatchet flap was deemed most appropriate.  Using a sterile surgical marker, an appropriate hatchet flap was drawn incorporating the defect and placing the expected incisions within the relaxed skin tension lines where possible.    The area thus outlined was incised deep to adipose tissue with a #15 scalpel blade.  The skin margins were undermined to an appropriate distance in all directions utilizing iris scissors.
Rotation Flap Text: The defect edges were debeveled with a #15 scalpel blade.  Given the location of the defect, shape of the defect and the proximity to free margins a rotation flap was deemed most appropriate.  Using a sterile surgical marker, an appropriate rotation flap was drawn incorporating the defect and placing the expected incisions within the relaxed skin tension lines where possible.    The area thus outlined was incised deep to adipose tissue with a #15 scalpel blade.  The skin margins were undermined to an appropriate distance in all directions utilizing iris scissors.
Bilateral Rotation Flap Text: The defect edges were debeveled with a #15 scalpel blade. Given the location of the defect, shape of the defect and the proximity to free margins a bilateral rotation flap was deemed most appropriate. Using a sterile surgical marker, an appropriate rotation flap was drawn incorporating the defect and placing the expected incisions within the relaxed skin tension lines where possible. The area thus outlined was incised deep to adipose tissue with a #15 scalpel blade. The skin margins were undermined to an appropriate distance in all directions utilizing iris scissors. Following this, the designed flap was carried over into the primary defect and sutured into place.
Spiral Flap Text: The defect edges were debeveled with a #15 scalpel blade.  Given the location of the defect, shape of the defect and the proximity to free margins a spiral flap was deemed most appropriate.  Using a sterile surgical marker, an appropriate rotation flap was drawn incorporating the defect and placing the expected incisions within the relaxed skin tension lines where possible. The area thus outlined was incised deep to adipose tissue with a #15 scalpel blade.  The skin margins were undermined to an appropriate distance in all directions utilizing iris scissors.
Staged Advancement Flap Text: The defect edges were debeveled with a #15 scalpel blade. Given the location of the defect, shape of the defect and the proximity to free margins a staged advancement flap was deemed most appropriate. Using a sterile surgical marker, an appropriate advancement flap was drawn incorporating the defect and placing the expected incisions within the relaxed skin tension lines where possible. The area thus outlined was incised deep to adipose tissue with a #15 scalpel blade. The skin margins were undermined to an appropriate distance in all directions utilizing iris scissors. Following this, the designed flap was carried over into the primary defect and sutured into place.
Star Wedge Flap Text: The defect edges were debeveled with a #15 scalpel blade.  Given the location of the defect, shape of the defect and the proximity to free margins a star wedge flap was deemed most appropriate.  Using a sterile surgical marker, an appropriate rotation flap was drawn incorporating the defect and placing the expected incisions within the relaxed skin tension lines where possible. The area thus outlined was incised deep to adipose tissue with a #15 scalpel blade.  The skin margins were undermined to an appropriate distance in all directions utilizing iris scissors.
Transposition Flap Text: The defect edges were debeveled with a #15 scalpel blade.  Given the location of the defect and the proximity to free margins a transposition flap was deemed most appropriate.  Using a sterile surgical marker, an appropriate transposition flap was drawn incorporating the defect.    The area thus outlined was incised deep to adipose tissue with a #15 scalpel blade.  The skin margins were undermined to an appropriate distance in all directions utilizing iris scissors.
Muscle Hinge Flap Text: The defect edges were debeveled with a #15 scalpel blade.  Given the size, depth and location of the defect and the proximity to free margins a muscle hinge flap was deemed most appropriate.  Using a sterile surgical marker, an appropriate hinge flap was drawn incorporating the defect. The area thus outlined was incised with a #15 scalpel blade.  The skin margins were undermined to an appropriate distance in all directions utilizing iris scissors.
Mustarde Flap Text: The defect edges were debeveled with a #15 scalpel blade.  Given the size, depth and location of the defect and the proximity to free margins a Mustarde flap was deemed most appropriate. Using a sterile surgical marker, an appropriate flap was drawn incorporating the defect. The area thus outlined was incised with a #15 scalpel blade. The skin margins were undermined to an appropriate distance in all directions utilizing iris scissors. Following this, the designed flap was carried into the primary defect and sutured into place.
Nasal Turnover Hinge Flap Text: The defect edges were debeveled with a #15 scalpel blade.  Given the size, depth, location of the defect and the defect being full thickness a nasal turnover hinge flap was deemed most appropriate. Using a sterile surgical marker, an appropriate hinge flap was drawn incorporating the defect. The area thus outlined was incised with a #15 scalpel blade. The flap was designed to recreate the nasal mucosal lining and the alar rim. The skin margins were undermined to an appropriate distance in all directions utilizing iris scissors. Following this, the designed flap was carried over into the primary defect and sutured into place
Nasalis-Muscle-Based Myocutaneous Island Pedicle Flap Text: Using a #15 blade, an incision was made around the donor flap to the level of the nasalis muscle. Wide lateral undermining was then performed in both the subcutaneous plane above the nasalis muscle, and in a submuscular plane just above periosteum. This allowed the formation of a free nasalis muscle axial pedicle (based on the angular artery) which was still attached to the actual cutaneous flap, increasing its mobility and vascular viability. Hemostasis was obtained with pinpoint electrocoagulation. The flap was mobilized into position and the pivotal anchor points positioned and stabilized with buried interrupted sutures. Subcutaneous and dermal tissues were closed in a multilayered fashion with sutures. Tissue redundancies were excised, and the epidermal edges were apposed without significant tension and sutured with sutures.
Nasalis Myocutaneous Flap Text: Using a #15 blade, an incision was made around the donor flap to the level of the nasalis muscle. Wide lateral undermining was then performed in both the subcutaneous plane above the nasalis muscle, and in a submuscular plane just above periosteum. This allowed the formation of a free nasalis muscle axial pedicle which was still attached to the actual cutaneous flap, increasing its mobility and vascular viability. Hemostasis was obtained with pinpoint electrocoagulation. The flap was mobilized into position and the pivotal anchor points positioned and stabilized with buried interrupted sutures. Subcutaneous and dermal tissues were closed in a multilayered fashion with sutures. Tissue redundancies were excised, and the epidermal edges were apposed without significant tension and sutured with sutures.
Nasolabial Transposition Flap Text: The defect edges were debeveled with a #15 scalpel blade.  Given the size, depth and location of the defect and the proximity to free margins a nasolabial transposition flap was deemed most appropriate. Using a sterile surgical marker, an appropriate flap was drawn incorporating the defect. The area thus outlined was incised with a #15 scalpel blade. The skin margins were undermined to an appropriate distance in all directions utilizing iris scissors. Following this, the designed flap was carried into the primary defect and sutured into place.
Orbicularis Oris Muscle Flap Text: The defect edges were debeveled with a #15 scalpel blade.  Given that the defect affected the competency of the oral sphincter an orbicularis oris muscle flap was deemed most appropriate to restore this competency and normal muscle function.  Using a sterile surgical marker, an appropriate flap was drawn incorporating the defect. The area thus outlined was incised with a #15 scalpel blade. Following this, the designed flap was carried over into the primary defect and sutured into place.
Melolabial Transposition Flap Text: The defect edges were debeveled with a #15 scalpel blade.  Given the location of the defect and the proximity to free margins a melolabial flap was deemed most appropriate.  Using a sterile surgical marker, an appropriate melolabial transposition flap was drawn incorporating the defect.    The area thus outlined was incised deep to adipose tissue with a #15 scalpel blade.  The skin margins were undermined to an appropriate distance in all directions utilizing iris scissors.
Rectangular Flap Text: The defect edges were debeveled with a #15 scalpel blade. Given the location of the defect and the proximity to free margins a rectangular flap was deemed most appropriate. Using a sterile surgical marker, an appropriate rectangular flap was drawn incorporating the defect. The area thus outlined was incised deep to adipose tissue with a #15 scalpel blade. The skin margins were undermined to an appropriate distance in all directions utilizing iris scissors. Following this, the designed flap was carried over into the primary defect and sutured into place.
Rhombic Flap Text: The defect edges were debeveled with a #15 scalpel blade.  Given the location of the defect and the proximity to free margins a rhombic flap was deemed most appropriate.  Using a sterile surgical marker, an appropriate rhombic flap was drawn incorporating the defect.    The area thus outlined was incised deep to adipose tissue with a #15 scalpel blade.  The skin margins were undermined to an appropriate distance in all directions utilizing iris scissors.
Rhomboid Transposition Flap Text: The defect edges were debeveled with a #15 scalpel blade. Given the location of the defect and the proximity to free margins a rhomboid transposition flap was deemed most appropriate. Using a sterile surgical marker, an appropriate rhomboid flap was drawn incorporating the defect. The area thus outlined was incised deep to adipose tissue with a #15 scalpel blade. The skin margins were undermined to an appropriate distance in all directions utilizing iris scissors. Following this, the designed flap was carried over into the primary defect and sutured into place.
Bi-Rhombic Flap Text: The defect edges were debeveled with a #15 scalpel blade.  Given the location of the defect and the proximity to free margins a bi-rhombic flap was deemed most appropriate.  Using a sterile surgical marker, an appropriate rhombic flap was drawn incorporating the defect. The area thus outlined was incised deep to adipose tissue with a #15 scalpel blade.  The skin margins were undermined to an appropriate distance in all directions utilizing iris scissors.
Helical Rim Advancement Flap Text: The defect edges were debeveled with a #15 blade scalpel.  Given the location of the defect and the proximity to free margins (helical rim) a double helical rim advancement flap was deemed most appropriate.  Using a sterile surgical marker, the appropriate advancement flaps were drawn incorporating the defect and placing the expected incisions between the helical rim and antihelix where possible.  The area thus outlined was incised through and through with a #15 scalpel blade.  With a skin hook and iris scissors, the flaps were gently and sharply undermined and freed up.
Bilateral Helical Rim Advancement Flap Text: The defect edges were debeveled with a #15 blade scalpel.  Given the location of the defect and the proximity to free margins (helical rim) a bilateral helical rim advancement flap was deemed most appropriate.  Using a sterile surgical marker, the appropriate advancement flaps were drawn incorporating the defect and placing the expected incisions between the helical rim and antihelix where possible.  The area thus outlined was incised through and through with a #15 scalpel blade.  With a skin hook and iris scissors, the flaps were gently and sharply undermined and freed up.
Ear Star Wedge Flap Text: The defect edges were debeveled with a #15 blade scalpel.  Given the location of the defect and the proximity to free margins (helical rim) an ear star wedge flap was deemed most appropriate.  Using a sterile surgical marker, the appropriate flap was drawn incorporating the defect and placing the expected incisions between the helical rim and antihelix where possible.  The area thus outlined was incised through and through with a #15 scalpel blade.
Flip-Flop Flap Text: The defect edges were debeveled with a #15 blade scalpel.  Given the location of the defect and the proximity to free margins a flip-flop flap was deemed most appropriate. Using a sterile surgical marker, the appropriate flap was drawn incorporating the defect and placing the expected incisions between the helical rim and antihelix where possible.  The area thus outlined was incised through and through with a #15 scalpel blade. Following this, the designed flap was carried over into the primary defect and sutured into place.
Banner Transposition Flap Text: The defect edges were debeveled with a #15 scalpel blade. Given the location of the defect and the proximity to free margins a Banner transposition flap was deemed most appropriate. Using a sterile surgical marker, an appropriate flap was drawn around the defect. The area thus outlined was incised deep to adipose tissue with a #15 scalpel blade. The skin margins were undermined to an appropriate distance in all directions utilizing iris scissors. Following this, the designed flap was carried into the primary defect and sutured into place.
Bilobed Flap Text: The defect edges were debeveled with a #15 scalpel blade.  Given the location of the defect and the proximity to free margins a bilobe flap was deemed most appropriate.  Using a sterile surgical marker, an appropriate bilobe flap drawn around the defect.    The area thus outlined was incised deep to adipose tissue with a #15 scalpel blade.  The skin margins were undermined to an appropriate distance in all directions utilizing iris scissors.
Bilobed Transposition Flap Text: The defect edges were debeveled with a #15 scalpel blade.  Given the location of the defect and the proximity to free margins a bilobed transposition flap was deemed most appropriate.  Using a sterile surgical marker, an appropriate bilobe flap drawn around the defect.    The area thus outlined was incised deep to adipose tissue with a #15 scalpel blade.  The skin margins were undermined to an appropriate distance in all directions utilizing iris scissors.
Trilobed Flap Text: The defect edges were debeveled with a #15 scalpel blade.  Given the location of the defect and the proximity to free margins a trilobed flap was deemed most appropriate.  Using a sterile surgical marker, an appropriate trilobed flap drawn around the defect.    The area thus outlined was incised deep to adipose tissue with a #15 scalpel blade.  The skin margins were undermined to an appropriate distance in all directions utilizing iris scissors.
Dorsal Nasal Flap Text: The defect edges were debeveled with a #15 scalpel blade.  Given the location of the defect and the proximity to free margins a dorsal nasal flap was deemed most appropriate.  Using a sterile surgical marker, an appropriate dorsal nasal flap was drawn around the defect.    The area thus outlined was incised deep to adipose tissue with a #15 scalpel blade.  The skin margins were undermined to an appropriate distance in all directions utilizing iris scissors.
Island Pedicle Flap Text: The defect edges were debeveled with a #15 scalpel blade.  Given the location of the defect, shape of the defect and the proximity to free margins an island pedicle advancement flap was deemed most appropriate.  Using a sterile surgical marker, an appropriate advancement flap was drawn incorporating the defect, outlining the appropriate donor tissue and placing the expected incisions within the relaxed skin tension lines where possible.    The area thus outlined was incised deep to adipose tissue with a #15 scalpel blade.  The skin margins were undermined to an appropriate distance in all directions around the primary defect and laterally outward around the island pedicle utilizing iris scissors.  There was minimal undermining beneath the pedicle flap.
Island Pedicle Flap With Canthal Suspension Text: The defect edges were debeveled with a #15 scalpel blade.  Given the location of the defect, shape of the defect and the proximity to free margins an island pedicle advancement flap was deemed most appropriate.  Using a sterile surgical marker, an appropriate advancement flap was drawn incorporating the defect, outlining the appropriate donor tissue and placing the expected incisions within the relaxed skin tension lines where possible. The area thus outlined was incised deep to adipose tissue with a #15 scalpel blade.  The skin margins were undermined to an appropriate distance in all directions around the primary defect and laterally outward around the island pedicle utilizing iris scissors.  There was minimal undermining beneath the pedicle flap. A suspension suture was placed in the canthal tendon to prevent tension and prevent ectropion.
Alar Island Pedicle Flap Text: The defect edges were debeveled with a #15 scalpel blade.  Given the location of the defect, shape of the defect and the proximity to the alar rim an island pedicle advancement flap was deemed most appropriate.  Using a sterile surgical marker, an appropriate advancement flap was drawn incorporating the defect, outlining the appropriate donor tissue and placing the expected incisions within the nasal ala running parallel to the alar rim. The area thus outlined was incised with a #15 scalpel blade.  The skin margins were undermined minimally to an appropriate distance in all directions around the primary defect and laterally outward around the island pedicle utilizing iris scissors.  There was minimal undermining beneath the pedicle flap.
Double Island Pedicle Flap Text: The defect edges were debeveled with a #15 scalpel blade.  Given the location of the defect, shape of the defect and the proximity to free margins a double island pedicle advancement flap was deemed most appropriate.  Using a sterile surgical marker, an appropriate advancement flap was drawn incorporating the defect, outlining the appropriate donor tissue and placing the expected incisions within the relaxed skin tension lines where possible.    The area thus outlined was incised deep to adipose tissue with a #15 scalpel blade.  The skin margins were undermined to an appropriate distance in all directions around the primary defect and laterally outward around the island pedicle utilizing iris scissors.  There was minimal undermining beneath the pedicle flap.
Island Pedicle Flap-Requiring Vessel Identification Text: The defect edges were debeveled with a #15 scalpel blade.  Given the location of the defect, shape of the defect and the proximity to free margins an island pedicle advancement flap was deemed most appropriate.  Using a sterile surgical marker, an appropriate advancement flap was drawn, based on the axial vessel mentioned above, incorporating the defect, outlining the appropriate donor tissue and placing the expected incisions within the relaxed skin tension lines where possible.    The area thus outlined was incised deep to adipose tissue with a #15 scalpel blade.  The skin margins were undermined to an appropriate distance in all directions around the primary defect and laterally outward around the island pedicle utilizing iris scissors.  There was minimal undermining beneath the pedicle flap.
Keystone Flap Text: The defect edges were debeveled with a #15 scalpel blade.  Given the location of the defect, shape of the defect a keystone flap was deemed most appropriate.  Using a sterile surgical marker, an appropriate keystone flap was drawn incorporating the defect, outlining the appropriate donor tissue and placing the expected incisions within the relaxed skin tension lines where possible. The area thus outlined was incised deep to adipose tissue with a #15 scalpel blade.  The skin margins were undermined to an appropriate distance in all directions around the primary defect and laterally outward around the flap utilizing iris scissors.
O-T Plasty Text: The defect edges were debeveled with a #15 scalpel blade.  Given the location of the defect, shape of the defect and the proximity to free margins an O-T plasty was deemed most appropriate.  Using a sterile surgical marker, an appropriate O-T plasty was drawn incorporating the defect and placing the expected incisions within the relaxed skin tension lines where possible.    The area thus outlined was incised deep to adipose tissue with a #15 scalpel blade.  The skin margins were undermined to an appropriate distance in all directions utilizing iris scissors.
O-Z Plasty Text: The defect edges were debeveled with a #15 scalpel blade.  Given the location of the defect, shape of the defect and the proximity to free margins an O-Z plasty (double transposition flap) was deemed most appropriate.  Using a sterile surgical marker, the appropriate transposition flaps were drawn incorporating the defect and placing the expected incisions within the relaxed skin tension lines where possible.    The area thus outlined was incised deep to adipose tissue with a #15 scalpel blade.  The skin margins were undermined to an appropriate distance in all directions utilizing iris scissors.  Hemostasis was achieved with electrocautery.  The flaps were then transposed into place, one clockwise and the other counterclockwise, and anchored with interrupted buried subcutaneous sutures.
Double O-Z Plasty Text: The defect edges were debeveled with a #15 scalpel blade. Given the location of the defect, shape of the defect and the proximity to free margins a Double O-Z plasty (double transposition flap) was deemed most appropriate. Using a sterile surgical marker, the appropriate transposition flaps were drawn incorporating the defect and placing the expected incisions within the relaxed skin tension lines where possible. The area thus outlined was incised deep to adipose tissue with a #15 scalpel blade. The skin margins were undermined to an appropriate distance in all directions utilizing iris scissors. Hemostasis was achieved with electrocautery. The flaps were then transposed and carried over into place, one clockwise and the other counterclockwise, and anchored with interrupted buried subcutaneous sutures.
V-Y Plasty Text: The defect edges were debeveled with a #15 scalpel blade.  Given the location of the defect, shape of the defect and the proximity to free margins an V-Y advancement flap was deemed most appropriate.  Using a sterile surgical marker, an appropriate advancement flap was drawn incorporating the defect and placing the expected incisions within the relaxed skin tension lines where possible.    The area thus outlined was incised deep to adipose tissue with a #15 scalpel blade.  The skin margins were undermined to an appropriate distance in all directions utilizing iris scissors.
H Plasty Text: Given the location of the defect, shape of the defect and the proximity to free margins a H-plasty was deemed most appropriate for repair.  Using a sterile surgical marker, the appropriate advancement arms of the H-plasty were drawn incorporating the defect and placing the expected incisions within the relaxed skin tension lines where possible. The area thus outlined was incised deep to adipose tissue with a #15 scalpel blade. The skin margins were undermined to an appropriate distance in all directions utilizing iris scissors.  The opposing advancement arms were then advanced into place in opposite direction and anchored with interrupted buried subcutaneous sutures.
W Plasty Text: The lesion was extirpated to the level of the fat with a #15 scalpel blade.  Given the location of the defect, shape of the defect and the proximity to free margins a W-plasty was deemed most appropriate for repair.  Using a sterile surgical marker, the appropriate transposition arms of the W-plasty were drawn incorporating the defect and placing the expected incisions within the relaxed skin tension lines where possible.    The area thus outlined was incised deep to adipose tissue with a #15 scalpel blade.  The skin margins were undermined to an appropriate distance in all directions utilizing iris scissors.  The opposing transposition arms were then transposed into place in opposite direction and anchored with interrupted buried subcutaneous sutures.
Z Plasty Text: The lesion was extirpated to the level of the fat with a #15 scalpel blade.  Given the location of the defect, shape of the defect and the proximity to free margins a Z-plasty was deemed most appropriate for repair.  Using a sterile surgical marker, the appropriate transposition arms of the Z-plasty were drawn incorporating the defect and placing the expected incisions within the relaxed skin tension lines where possible.    The area thus outlined was incised deep to adipose tissue with a #15 scalpel blade.  The skin margins were undermined to an appropriate distance in all directions utilizing iris scissors.  The opposing transposition arms were then transposed into place in opposite direction and anchored with interrupted buried subcutaneous sutures.
Double Z Plasty Text: The lesion was extirpated to the level of the fat with a #15 scalpel blade. Given the location of the defect, shape of the defect and the proximity to free margins a double Z-plasty was deemed most appropriate for repair. Using a sterile surgical marker, the appropriate transposition arms of the double Z-plasty were drawn incorporating the defect and placing the expected incisions within the relaxed skin tension lines where possible. The area thus outlined was incised deep to adipose tissue with a #15 scalpel blade. The skin margins were undermined to an appropriate distance in all directions utilizing iris scissors. The opposing transposition arms were then transposed and carried over into place in opposite direction and anchored with interrupted buried subcutaneous sutures.
Zygomaticofacial Flap Text: Given the location of the defect, shape of the defect and the proximity to free margins a zygomaticofacial flap was deemed most appropriate for repair. Using a sterile surgical marker, the appropriate flap was drawn incorporating the defect and placing the expected incisions within the relaxed skin tension lines where possible. The area thus outlined was incised deep to adipose tissue with a #15 scalpel blade with preservation of a vascular pedicle.  The skin margins were undermined to an appropriate distance in all directions utilizing iris scissors. The flap was then carried over into the defect and anchored with interrupted buried subcutaneous sutures.
Cheek Interpolation Flap Text: A decision was made to reconstruct the defect utilizing an interpolation axial flap and a staged reconstruction.  A telfa template was made of the defect.  This telfa template was then used to outline the Cheek Interpolation flap.  The donor area for the pedicle flap was then injected with anesthesia.  The flap was excised through the skin and subcutaneous tissue down to the layer of the underlying musculature.  The interpolation flap was carefully excised within this deep plane to maintain its blood supply.  The edges of the donor site were undermined.   The donor site was closed in a primary fashion.  The pedicle was then rotated into position and sutured.  Once the tube was sutured into place, adequate blood supply was confirmed with blanching and refill.  The pedicle was then wrapped with xeroform gauze and dressed appropriately with a telfa and gauze bandage to ensure continued blood supply and protect the attached pedicle.
Cheek-To-Nose Interpolation Flap Text: A decision was made to reconstruct the defect utilizing an interpolation axial flap and a staged reconstruction.  A telfa template was made of the defect.  This telfa template was then used to outline the Cheek-To-Nose Interpolation flap.  The donor area for the pedicle flap was then injected with anesthesia.  The flap was excised through the skin and subcutaneous tissue down to the layer of the underlying musculature.  The interpolation flap was carefully excised within this deep plane to maintain its blood supply.  The edges of the donor site were undermined.   The donor site was closed in a primary fashion.  The pedicle was then rotated into position and sutured.  Once the tube was sutured into place, adequate blood supply was confirmed with blanching and refill.  The pedicle was then wrapped with xeroform gauze and dressed appropriately with a telfa and gauze bandage to ensure continued blood supply and protect the attached pedicle.
Interpolation Flap Text: A decision was made to reconstruct the defect utilizing an interpolation axial flap and a staged reconstruction.  A telfa template was made of the defect.  This telfa template was then used to outline the interpolation flap.  The donor area for the pedicle flap was then injected with anesthesia.  The flap was excised through the skin and subcutaneous tissue down to the layer of the underlying musculature.  The interpolation flap was carefully excised within this deep plane to maintain its blood supply.  The edges of the donor site were undermined.   The donor site was closed in a primary fashion.  The pedicle was then rotated into position and sutured.  Once the tube was sutured into place, adequate blood supply was confirmed with blanching and refill.  The pedicle was then wrapped with xeroform gauze and dressed appropriately with a telfa and gauze bandage to ensure continued blood supply and protect the attached pedicle.
Melolabial Interpolation Flap Text: A decision was made to reconstruct the defect utilizing an interpolation axial flap and a staged reconstruction.  A telfa template was made of the defect.  This telfa template was then used to outline the melolabial interpolation flap.  The donor area for the pedicle flap was then injected with anesthesia.  The flap was excised through the skin and subcutaneous tissue down to the layer of the underlying musculature.  The pedicle flap was carefully excised within this deep plane to maintain its blood supply.  The edges of the donor site were undermined.   The donor site was closed in a primary fashion.  The pedicle was then rotated into position and sutured.  Once the tube was sutured into place, adequate blood supply was confirmed with blanching and refill.  The pedicle was then wrapped with xeroform gauze and dressed appropriately with a telfa and gauze bandage to ensure continued blood supply and protect the attached pedicle.
Mastoid Interpolation Flap Text: A decision was made to reconstruct the defect utilizing an interpolation axial flap and a staged reconstruction.  A telfa template was made of the defect.  This telfa template was then used to outline the mastoid interpolation flap.  The donor area for the pedicle flap was then injected with anesthesia.  The flap was excised through the skin and subcutaneous tissue down to the layer of the underlying musculature.  The pedicle flap was carefully excised within this deep plane to maintain its blood supply.  The edges of the donor site were undermined.   The donor site was closed in a primary fashion.  The pedicle was then rotated into position and sutured.  Once the tube was sutured into place, adequate blood supply was confirmed with blanching and refill.  The pedicle was then wrapped with xeroform gauze and dressed appropriately with a telfa and gauze bandage to ensure continued blood supply and protect the attached pedicle.
Posterior Auricular Interpolation Flap Text: A decision was made to reconstruct the defect utilizing an interpolation axial flap and a staged reconstruction.  A telfa template was made of the defect.  This telfa template was then used to outline the posterior auricular interpolation flap.  The donor area for the pedicle flap was then injected with anesthesia.  The flap was excised through the skin and subcutaneous tissue down to the layer of the underlying musculature.  The pedicle flap was carefully excised within this deep plane to maintain its blood supply.  The edges of the donor site were undermined.   The donor site was closed in a primary fashion.  The pedicle was then rotated into position and sutured.  Once the tube was sutured into place, adequate blood supply was confirmed with blanching and refill.  The pedicle was then wrapped with xeroform gauze and dressed appropriately with a telfa and gauze bandage to ensure continued blood supply and protect the attached pedicle.
Paramedian Forehead Flap Text: A decision was made to reconstruct the defect utilizing an interpolation axial flap and a staged reconstruction.  A telfa template was made of the defect.  This telfa template was then used to outline the paramedian forehead pedicle flap.  The donor area for the pedicle flap was then injected with anesthesia.  The flap was excised through the skin and subcutaneous tissue down to the layer of the underlying musculature.  The pedicle flap was carefully excised within this deep plane to maintain its blood supply.  The edges of the donor site were undermined.   The donor site was closed in a primary fashion.  The pedicle was then rotated into position and sutured.  Once the tube was sutured into place, adequate blood supply was confirmed with blanching and refill.  The pedicle was then wrapped with xeroform gauze and dressed appropriately with a telfa and gauze bandage to ensure continued blood supply and protect the attached pedicle.
Abbe Flap (Upper To Lower Lip) Text: The defect of the lower lip was assessed and measured.  Given the location and size of the defect, an Abbe flap was deemed most appropriate. Using a sterile surgical marker, an appropriate Abbe flap was measured and drawn on the upper lip. Local anesthesia was then infiltrated.  A scalpel was then used to incise the upper lip through and through the skin, vermilion, muscle and mucosa, leaving the flap pedicled on the opposite side.  The flap was then rotated and transferred to the lower lip defect.  The flap was then sutured into place with a three layer technique, closing the orbicularis oris muscle layer with subcutaneous buried sutures, followed by a mucosal layer and an epidermal layer.
Abbe Flap (Lower To Upper Lip) Text: The defect of the upper lip was assessed and measured.  Given the location and size of the defect, an Abbe flap was deemed most appropriate. Using a sterile surgical marker, an appropriate Abbe flap was measured and drawn on the lower lip. Local anesthesia was then infiltrated. A scalpel was then used to incise the upper lip through and through the skin, vermilion, muscle and mucosa, leaving the flap pedicled on the opposite side.  The flap was then rotated and transferred to the lower lip defect.  The flap was then sutured into place with a three layer technique, closing the orbicularis oris muscle layer with subcutaneous buried sutures, followed by a mucosal layer and an epidermal layer.
Estlander Flap (Upper To Lower Lip) Text: The defect of the lower lip was assessed and measured.  Given the location and size of the defect, an Estlander flap was deemed most appropriate. Using a sterile surgical marker, an appropriate Estlander flap was measured and drawn on the upper lip. Local anesthesia was then infiltrated. A scalpel was then used to incise the lateral aspect of the flap, through skin, muscle and mucosa, leaving the flap pedicled medially.  The flap was then rotated and positioned to fill the lower lip defect.  The flap was then sutured into place with a three layer technique, closing the orbicularis oris muscle layer with subcutaneous buried sutures, followed by a mucosal layer and an epidermal layer.
Lip Wedge Excision Repair Text: Given the location of the defect and the proximity to free margins a full thickness wedge repair was deemed most appropriate.  Using a sterile surgical marker, the appropriate repair was drawn incorporating the defect and placing the expected incisions perpendicular to the vermilion border.  The vermilion border was also meticulously outlined to ensure appropriate reapproximation during the repair.  The area thus outlined was incised through and through with a #15 scalpel blade.  The muscularis and dermis were reaproximated with deep sutures following hemostasis. Care was taken to realign the vermilion border before proceeding with the superficial closure.  Once the vermilion was realigned the superfical and mucosal closure was finished.
Ftsg Text: The defect edges were debeveled with a #15 scalpel blade.  Given the location of the defect, shape of the defect and the proximity to free margins a full thickness skin graft was deemed most appropriate.  Using a sterile surgical marker, the primary defect shape was transferred to the donor site. The area thus outlined was incised deep to adipose tissue with a #15 scalpel blade.  The harvested graft was then trimmed of adipose tissue until only dermis and epidermis was left.  The skin margins of the secondary defect were undermined to an appropriate distance in all directions utilizing iris scissors.  The secondary defect was closed with interrupted buried subcutaneous sutures.  The skin edges were then re-apposed with running  sutures.  The skin graft was then placed in the primary defect and oriented appropriately.
Split-Thickness Skin Graft Text: The defect edges were debeveled with a #15 scalpel blade.  Given the location of the defect, shape of the defect and the proximity to free margins a split thickness skin graft was deemed most appropriate.  Using a sterile surgical marker, the primary defect shape was transferred to the donor site. The split thickness graft was then harvested.  The skin graft was then placed in the primary defect and oriented appropriately.
Pinch Graft Text: The defect edges were debeveled with a #15 scalpel blade. Given the location of the defect, shape of the defect and the proximity to free margins a pinch graft was deemed most appropriate. Using a sterile surgical marker, the primary defect shape was transferred to the donor site. The area thus outlined was incised deep to adipose tissue with a #15 scalpel blade.  The harvested graft was then trimmed of adipose tissue until only dermis and epidermis was left. The skin graft was then placed in the primary defect and oriented appropriately.
Burow's Graft Text: The defect edges were debeveled with a #15 scalpel blade. Given the location of the defect, shape of the defect, the proximity to free margins and the presence of a standing cone deformity a Burow's skin graft was deemed most appropriate. The standing cone was removed and this tissue was then trimmed to the shape of the primary defect. The adipose tissue was also removed until only dermis and epidermis were left.  The skin graft was then placed in the primary defect and oriented appropriately.
Cartilage Graft Text: The defect edges were debeveled with a #15 scalpel blade.  Given the location of the defect, shape of the defect, the fact the defect involved a full thickness cartilage defect a cartilage graft was deemed most appropriate.  An appropriate donor site was identified, cleansed, and anesthetized. The cartilage graft was then harvested and transferred to the recipient site, oriented appropriately and then sutured into place.  The secondary defect was then repaired using a primary closure.
Composite Graft Text: The defect edges were debeveled with a #15 scalpel blade.  Given the location of the defect, shape of the defect, the proximity to free margins and the fact the defect was full thickness a composite graft was deemed most appropriate.  The defect was outline and then transferred to the donor site.  A full thickness graft was then excised from the donor site. The graft was then placed in the primary defect, oriented appropriately and then sutured into place.  The secondary defect was then repaired using a primary closure.
Epidermal Autograft Text: The defect edges were debeveled with a #15 scalpel blade.  Given the location of the defect, shape of the defect and the proximity to free margins an epidermal autograft was deemed most appropriate.  Using a sterile surgical marker, the primary defect shape was transferred to the donor site. The epidermal graft was then harvested.  The skin graft was then placed in the primary defect and oriented appropriately.
Dermal Autograft Text: The defect edges were debeveled with a #15 scalpel blade.  Given the location of the defect, shape of the defect and the proximity to free margins a dermal autograft was deemed most appropriate.  Using a sterile surgical marker, the primary defect shape was transferred to the donor site. The area thus outlined was incised deep to adipose tissue with a #15 scalpel blade.  The harvested graft was then trimmed of adipose and epidermal tissue until only dermis was left.  The skin graft was then placed in the primary defect and oriented appropriately.
Skin Substitute Text: The defect edges were debeveled with a #15 scalpel blade.  Given the location of the defect, shape of the defect and the proximity to free margins a skin substitute graft was deemed most appropriate.  The graft material was trimmed to fit the size of the defect. The graft was then placed in the primary defect and oriented appropriately.
Tissue Cultured Epidermal Autograft Text: The defect edges were debeveled with a #15 scalpel blade.  Given the location of the defect, shape of the defect and the proximity to free margins a tissue cultured epidermal autograft was deemed most appropriate.  The graft was then trimmed to fit the size of the defect.  The graft was then placed in the primary defect and oriented appropriately.
Xenograft Text: The defect edges were debeveled with a #15 scalpel blade.  Given the location of the defect, shape of the defect and the proximity to free margins a xenograft was deemed most appropriate.  The graft was then trimmed to fit the size of the defect.  The graft was then placed in the primary defect and oriented appropriately.
Purse String (Intermediate) Text: Given the location of the defect and the characteristics of the surrounding skin a purse string intermediate closure was deemed most appropriate.  Undermining was performed circumfirentially around the surgical defect.  A purse string suture was then placed and tightened.
Purse String (Simple) Text: Given the location of the defect and the characteristics of the surrounding skin a purse string simple closure was deemed most appropriate.  Undermining was performed circumferentially around the surgical defect.  A purse string suture was then placed and tightened.
Partial Purse String (Intermediate) Text: Given the location of the defect and the characteristics of the surrounding skin an intermediate purse string closure was deemed most appropriate.  Undermining was performed circumferentially around the surgical defect.  A purse string suture was then placed and tightened. Wound tension of the circular defect prevented complete closure of the wound.
Partial Purse String (Simple) Text: Given the location of the defect and the characteristics of the surrounding skin a simple purse string closure was deemed most appropriate.  Undermining was performed circumferentially around the surgical defect.  A purse string suture was then placed and tightened. Wound tension of the circular defect prevented complete closure of the wound.
Complex Repair And Single Advancement Flap Text: The defect edges were debeveled with a #15 scalpel blade.  The primary defect was closed partially with a complex linear closure.  Given the location of the remaining defect, shape of the defect and the proximity to free margins a single advancement flap was deemed most appropriate for complete closure of the defect.  Using a sterile surgical marker, an appropriate advancement flap was drawn incorporating the defect and placing the expected incisions within the relaxed skin tension lines where possible.    The area thus outlined was incised deep to adipose tissue with a #15 scalpel blade.  The skin margins were undermined to an appropriate distance in all directions utilizing iris scissors.
Complex Repair And Double Advancement Flap Text: The defect edges were debeveled with a #15 scalpel blade.  The primary defect was closed partially with a complex linear closure.  Given the location of the remaining defect, shape of the defect and the proximity to free margins a double advancement flap was deemed most appropriate for complete closure of the defect.  Using a sterile surgical marker, an appropriate advancement flap was drawn incorporating the defect and placing the expected incisions within the relaxed skin tension lines where possible.    The area thus outlined was incised deep to adipose tissue with a #15 scalpel blade.  The skin margins were undermined to an appropriate distance in all directions utilizing iris scissors.
Complex Repair And Modified Advancement Flap Text: The defect edges were debeveled with a #15 scalpel blade.  The primary defect was closed partially with a complex linear closure.  Given the location of the remaining defect, shape of the defect and the proximity to free margins a modified advancement flap was deemed most appropriate for complete closure of the defect.  Using a sterile surgical marker, an appropriate advancement flap was drawn incorporating the defect and placing the expected incisions within the relaxed skin tension lines where possible.    The area thus outlined was incised deep to adipose tissue with a #15 scalpel blade.  The skin margins were undermined to an appropriate distance in all directions utilizing iris scissors.
Complex Repair And A-T Advancement Flap Text: The defect edges were debeveled with a #15 scalpel blade.  The primary defect was closed partially with a complex linear closure.  Given the location of the remaining defect, shape of the defect and the proximity to free margins an A-T advancement flap was deemed most appropriate for complete closure of the defect.  Using a sterile surgical marker, an appropriate advancement flap was drawn incorporating the defect and placing the expected incisions within the relaxed skin tension lines where possible.    The area thus outlined was incised deep to adipose tissue with a #15 scalpel blade.  The skin margins were undermined to an appropriate distance in all directions utilizing iris scissors.
Complex Repair And O-T Advancement Flap Text: The defect edges were debeveled with a #15 scalpel blade.  The primary defect was closed partially with a complex linear closure.  Given the location of the remaining defect, shape of the defect and the proximity to free margins an O-T advancement flap was deemed most appropriate for complete closure of the defect.  Using a sterile surgical marker, an appropriate advancement flap was drawn incorporating the defect and placing the expected incisions within the relaxed skin tension lines where possible.    The area thus outlined was incised deep to adipose tissue with a #15 scalpel blade.  The skin margins were undermined to an appropriate distance in all directions utilizing iris scissors.
Complex Repair And O-L Flap Text: The defect edges were debeveled with a #15 scalpel blade.  The primary defect was closed partially with a complex linear closure.  Given the location of the remaining defect, shape of the defect and the proximity to free margins an O-L flap was deemed most appropriate for complete closure of the defect.  Using a sterile surgical marker, an appropriate flap was drawn incorporating the defect and placing the expected incisions within the relaxed skin tension lines where possible.    The area thus outlined was incised deep to adipose tissue with a #15 scalpel blade.  The skin margins were undermined to an appropriate distance in all directions utilizing iris scissors.
Complex Repair And Bilobe Flap Text: The defect edges were debeveled with a #15 scalpel blade.  The primary defect was closed partially with a complex linear closure.  Given the location of the remaining defect, shape of the defect and the proximity to free margins a bilobe flap was deemed most appropriate for complete closure of the defect.  Using a sterile surgical marker, an appropriate advancement flap was drawn incorporating the defect and placing the expected incisions within the relaxed skin tension lines where possible.    The area thus outlined was incised deep to adipose tissue with a #15 scalpel blade.  The skin margins were undermined to an appropriate distance in all directions utilizing iris scissors.
Complex Repair And Melolabial Flap Text: The defect edges were debeveled with a #15 scalpel blade.  The primary defect was closed partially with a complex linear closure.  Given the location of the remaining defect, shape of the defect and the proximity to free margins a melolabial flap was deemed most appropriate for complete closure of the defect.  Using a sterile surgical marker, an appropriate advancement flap was drawn incorporating the defect and placing the expected incisions within the relaxed skin tension lines where possible.    The area thus outlined was incised deep to adipose tissue with a #15 scalpel blade.  The skin margins were undermined to an appropriate distance in all directions utilizing iris scissors.
Complex Repair And Rotation Flap Text: The defect edges were debeveled with a #15 scalpel blade.  The primary defect was closed partially with a complex linear closure.  Given the location of the remaining defect, shape of the defect and the proximity to free margins a rotation flap was deemed most appropriate for complete closure of the defect.  Using a sterile surgical marker, an appropriate advancement flap was drawn incorporating the defect and placing the expected incisions within the relaxed skin tension lines where possible.    The area thus outlined was incised deep to adipose tissue with a #15 scalpel blade.  The skin margins were undermined to an appropriate distance in all directions utilizing iris scissors.
Complex Repair And Rhombic Flap Text: The defect edges were debeveled with a #15 scalpel blade.  The primary defect was closed partially with a complex linear closure.  Given the location of the remaining defect, shape of the defect and the proximity to free margins a rhombic flap was deemed most appropriate for complete closure of the defect.  Using a sterile surgical marker, an appropriate advancement flap was drawn incorporating the defect and placing the expected incisions within the relaxed skin tension lines where possible.    The area thus outlined was incised deep to adipose tissue with a #15 scalpel blade.  The skin margins were undermined to an appropriate distance in all directions utilizing iris scissors.
Complex Repair And Transposition Flap Text: The defect edges were debeveled with a #15 scalpel blade.  The primary defect was closed partially with a complex linear closure.  Given the location of the remaining defect, shape of the defect and the proximity to free margins a transposition flap was deemed most appropriate for complete closure of the defect.  Using a sterile surgical marker, an appropriate advancement flap was drawn incorporating the defect and placing the expected incisions within the relaxed skin tension lines where possible.    The area thus outlined was incised deep to adipose tissue with a #15 scalpel blade.  The skin margins were undermined to an appropriate distance in all directions utilizing iris scissors.
Complex Repair And V-Y Plasty Text: The defect edges were debeveled with a #15 scalpel blade.  The primary defect was closed partially with a complex linear closure.  Given the location of the remaining defect, shape of the defect and the proximity to free margins a V-Y plasty was deemed most appropriate for complete closure of the defect.  Using a sterile surgical marker, an appropriate advancement flap was drawn incorporating the defect and placing the expected incisions within the relaxed skin tension lines where possible.    The area thus outlined was incised deep to adipose tissue with a #15 scalpel blade.  The skin margins were undermined to an appropriate distance in all directions utilizing iris scissors.
Complex Repair And M Plasty Text: The defect edges were debeveled with a #15 scalpel blade.  The primary defect was closed partially with a complex linear closure.  Given the location of the remaining defect, shape of the defect and the proximity to free margins an M plasty was deemed most appropriate for complete closure of the defect.  Using a sterile surgical marker, an appropriate advancement flap was drawn incorporating the defect and placing the expected incisions within the relaxed skin tension lines where possible.    The area thus outlined was incised deep to adipose tissue with a #15 scalpel blade.  The skin margins were undermined to an appropriate distance in all directions utilizing iris scissors.
Complex Repair And Double M Plasty Text: The defect edges were debeveled with a #15 scalpel blade.  The primary defect was closed partially with a complex linear closure.  Given the location of the remaining defect, shape of the defect and the proximity to free margins a double M plasty was deemed most appropriate for complete closure of the defect.  Using a sterile surgical marker, an appropriate advancement flap was drawn incorporating the defect and placing the expected incisions within the relaxed skin tension lines where possible.    The area thus outlined was incised deep to adipose tissue with a #15 scalpel blade.  The skin margins were undermined to an appropriate distance in all directions utilizing iris scissors.
Complex Repair And W Plasty Text: The defect edges were debeveled with a #15 scalpel blade.  The primary defect was closed partially with a complex linear closure.  Given the location of the remaining defect, shape of the defect and the proximity to free margins a W plasty was deemed most appropriate for complete closure of the defect.  Using a sterile surgical marker, an appropriate advancement flap was drawn incorporating the defect and placing the expected incisions within the relaxed skin tension lines where possible.    The area thus outlined was incised deep to adipose tissue with a #15 scalpel blade.  The skin margins were undermined to an appropriate distance in all directions utilizing iris scissors.
Complex Repair And Z Plasty Text: The defect edges were debeveled with a #15 scalpel blade.  The primary defect was closed partially with a complex linear closure.  Given the location of the remaining defect, shape of the defect and the proximity to free margins a Z plasty was deemed most appropriate for complete closure of the defect.  Using a sterile surgical marker, an appropriate advancement flap was drawn incorporating the defect and placing the expected incisions within the relaxed skin tension lines where possible.    The area thus outlined was incised deep to adipose tissue with a #15 scalpel blade.  The skin margins were undermined to an appropriate distance in all directions utilizing iris scissors.
Complex Repair And Dorsal Nasal Flap Text: The defect edges were debeveled with a #15 scalpel blade.  The primary defect was closed partially with a complex linear closure.  Given the location of the remaining defect, shape of the defect and the proximity to free margins a dorsal nasal flap was deemed most appropriate for complete closure of the defect.  Using a sterile surgical marker, an appropriate flap was drawn incorporating the defect and placing the expected incisions within the relaxed skin tension lines where possible.    The area thus outlined was incised deep to adipose tissue with a #15 scalpel blade.  The skin margins were undermined to an appropriate distance in all directions utilizing iris scissors.
Complex Repair And Ftsg Text: The defect edges were debeveled with a #15 scalpel blade.  The primary defect was closed partially with a complex linear closure.  Given the location of the defect, shape of the defect and the proximity to free margins a full thickness skin graft was deemed most appropriate to repair the remaining defect.  The graft was trimmed to fit the size of the remaining defect.  The graft was then placed in the primary defect, oriented appropriately, and sutured into place.
Complex Repair And Burow's Graft Text: The defect edges were debeveled with a #15 scalpel blade.  The primary defect was closed partially with a complex linear closure.  Given the location of the defect, shape of the defect, the proximity to free margins and the presence of a standing cone deformity a Burow's graft was deemed most appropriate to repair the remaining defect.  The graft was trimmed to fit the size of the remaining defect.  The graft was then placed in the primary defect, oriented appropriately, and sutured into place.
Complex Repair And Split-Thickness Skin Graft Text: The defect edges were debeveled with a #15 scalpel blade.  The primary defect was closed partially with a complex linear closure.  Given the location of the defect, shape of the defect and the proximity to free margins a split thickness skin graft was deemed most appropriate to repair the remaining defect.  The graft was trimmed to fit the size of the remaining defect.  The graft was then placed in the primary defect, oriented appropriately, and sutured into place.
Complex Repair And Epidermal Autograft Text: The defect edges were debeveled with a #15 scalpel blade.  The primary defect was closed partially with a complex linear closure.  Given the location of the defect, shape of the defect and the proximity to free margins an epidermal autograft was deemed most appropriate to repair the remaining defect.  The graft was trimmed to fit the size of the remaining defect.  The graft was then placed in the primary defect, oriented appropriately, and sutured into place.
Complex Repair And Dermal Autograft Text: The defect edges were debeveled with a #15 scalpel blade.  The primary defect was closed partially with a complex linear closure.  Given the location of the defect, shape of the defect and the proximity to free margins an dermal autograft was deemed most appropriate to repair the remaining defect.  The graft was trimmed to fit the size of the remaining defect.  The graft was then placed in the primary defect, oriented appropriately, and sutured into place.
Complex Repair And Tissue Cultured Epidermal Autograft Text: The defect edges were debeveled with a #15 scalpel blade.  The primary defect was closed partially with a complex linear closure.  Given the location of the defect, shape of the defect and the proximity to free margins an tissue cultured epidermal autograft was deemed most appropriate to repair the remaining defect.  The graft was trimmed to fit the size of the remaining defect.  The graft was then placed in the primary defect, oriented appropriately, and sutured into place.
Complex Repair And Xenograft Text: The defect edges were debeveled with a #15 scalpel blade.  The primary defect was closed partially with a complex linear closure.  Given the location of the defect, shape of the defect and the proximity to free margins a xenograft was deemed most appropriate to repair the remaining defect.  The graft was trimmed to fit the size of the remaining defect.  The graft was then placed in the primary defect, oriented appropriately, and sutured into place.
Complex Repair And Skin Substitute Graft Text: The defect edges were debeveled with a #15 scalpel blade.  The primary defect was closed partially with a complex linear closure.  Given the location of the remaining defect, shape of the defect and the proximity to free margins a skin substitute graft was deemed most appropriate to repair the remaining defect.  The graft was trimmed to fit the size of the remaining defect.  The graft was then placed in the primary defect, oriented appropriately, and sutured into place.
Path Notes (To The Dermatopathologist): Please check margins.
Consent was obtained from the patient. The risks and benefits to therapy were discussed in detail. Specifically, the risks of infection, scarring, bleeding, prolonged wound healing, incomplete removal, allergy to anesthesia, nerve injury and recurrence were addressed. Prior to the procedure, the treatment site was clearly identified and confirmed by the patient. All components of Universal Protocol/PAUSE Rule completed.
Post-Care Instructions: I reviewed with the patient in detail post-care instructions:\\n1. Apply bacitracin over the sutures\\n2. Cut non-stick pad (Telfa) to cover the ointment and sutures. \\n3. Apply tape (hypafix) over the non-stick pad\\n4. Change once per day for 8 days\\n5. Shower with bandage on, change bandage after shower\\n\\nPatient is not to engage in any heavy lifting, exercise, hot tub, or swimming for the next 14 days. Should the patient develop any fevers, chills, bleeding, severe pain patient will contact the office immediately.
Home Suture Removal Text: Patient was provided a home suture removal kit and will remove their sutures at home.  If they have any questions or difficulties they will call the office.
Where Do You Want The Question To Include Opioid Counseling Located?: Case Summary Tab
Information: Selecting Yes will display possible errors in your note based on the variables you have selected. This validation is only offered as a suggestion for you. PLEASE NOTE THAT THE VALIDATION TEXT WILL BE REMOVED WHEN YOU FINALIZE YOUR NOTE. IF YOU WANT TO FAX A PRELIMINARY NOTE YOU WILL NEED TO TOGGLE THIS TO 'NO' IF YOU DO NOT WANT IT IN YOUR FAXED NOTE.

## 2024-10-07 NOTE — PROCEDURE: MIPS QUALITY
Quality 137: Melanoma: Continuity Of Care - Recall System: Patient information entered into a recall system that includes: target date for the next exam specified AND a process to follow up with patients regarding missed or unscheduled appointments
Quality 130: Documentation Of Current Medications In The Medical Record: Current Medications Documented
Detail Level: Detailed
Quality 431: Preventive Care And Screening: Unhealthy Alcohol Use - Screening: Patient not identified as an unhealthy alcohol user when screened for unhealthy alcohol use using a systematic screening method
Quality 138: Melanoma: Coordination Of Care: A treatment plan was communicated to the physicians providing continuing care within one month of diagnosis outlining: diagnosis, tumor thickness and a plan for surgery or alternate care.
Quality 226: Preventive Care And Screening: Tobacco Use: Screening And Cessation Intervention: Patient screened for tobacco use and is an ex/non-smoker
Quality 397: Melanoma: Reporting: Pathology report includes the pT Category, thickness, ulceration and mitotic rate, peripheral and deep margin status and presence or absence of microsatellitosis for invasive tumors.

## 2024-11-12 ENCOUNTER — APPOINTMENT (RX ONLY)
Dept: URBAN - NONMETROPOLITAN AREA CLINIC 1 | Facility: CLINIC | Age: 69
Setting detail: DERMATOLOGY
End: 2024-11-12

## 2024-11-12 DIAGNOSIS — K6811 OTHER POSTOPERATIVE INFECTION: ICD-10-CM

## 2024-11-12 DIAGNOSIS — T814XXA OTHER POSTOPERATIVE INFECTION: ICD-10-CM

## 2024-11-12 PROBLEM — T81.40XA INFECTION FOLLOWING A PROCEDURE, UNSPECIFIED, INITIAL ENCOUNTER: Status: ACTIVE | Noted: 2024-11-12

## 2024-11-12 PROCEDURE — 99203 OFFICE O/P NEW LOW 30 MIN: CPT

## 2024-11-12 PROCEDURE — ? ORDER TESTS

## 2024-11-12 PROCEDURE — ? SUTURE REMOVAL (NO GLOBAL PERIOD)

## 2024-11-12 PROCEDURE — ? PHOTO-DOCUMENTATION

## 2024-11-12 PROCEDURE — ? PRESCRIPTION

## 2024-11-12 PROCEDURE — ? COUNSELING

## 2024-11-12 RX ORDER — MUPIROCIN 20 MG/G
OINTMENT TOPICAL TID
Qty: 22 | Refills: 3 | Status: ERX | COMMUNITY
Start: 2024-11-12

## 2024-11-12 RX ORDER — DOXYCYCLINE 100 MG/1
CAPSULE ORAL BID
Qty: 28 | Refills: 0 | Status: ERX | COMMUNITY
Start: 2024-11-12

## 2024-11-12 RX ADMIN — MUPIROCIN: 20 OINTMENT TOPICAL at 00:00

## 2024-11-12 RX ADMIN — DOXYCYCLINE: 100 CAPSULE ORAL at 00:00

## 2024-11-12 ASSESSMENT — LOCATION SIMPLE DESCRIPTION DERM: LOCATION SIMPLE: UPPER BACK

## 2024-11-12 ASSESSMENT — LOCATION ZONE DERM: LOCATION ZONE: TRUNK

## 2024-11-12 ASSESSMENT — LOCATION DETAILED DESCRIPTION DERM: LOCATION DETAILED: INFERIOR THORACIC SPINE

## 2024-11-12 NOTE — HPI: WOUND CHECK (POST-OP)
Additional History: Melanoma excision at [facility] 10/7/2024.
Date Of Procedure: 10/7/2024

## 2024-11-22 ENCOUNTER — APPOINTMENT (RX ONLY)
Dept: URBAN - NONMETROPOLITAN AREA CLINIC 1 | Facility: CLINIC | Age: 69
Setting detail: DERMATOLOGY
End: 2024-11-22

## 2024-11-22 DIAGNOSIS — K6811 OTHER POSTOPERATIVE INFECTION: ICD-10-CM | Status: IMPROVED

## 2024-11-22 DIAGNOSIS — T814XXA OTHER POSTOPERATIVE INFECTION: ICD-10-CM | Status: IMPROVED

## 2024-11-22 PROBLEM — T81.40XA INFECTION FOLLOWING A PROCEDURE, UNSPECIFIED, INITIAL ENCOUNTER: Status: ACTIVE | Noted: 2024-11-22

## 2024-11-22 PROCEDURE — ? COUNSELING

## 2024-11-22 PROCEDURE — ? TREATMENT REGIMEN

## 2024-11-22 PROCEDURE — 99213 OFFICE O/P EST LOW 20 MIN: CPT

## 2024-11-22 PROCEDURE — ? PHOTO-DOCUMENTATION

## 2025-01-30 ENCOUNTER — APPOINTMENT (OUTPATIENT)
Dept: URBAN - METROPOLITAN AREA CLINIC 4 | Facility: CLINIC | Age: 70
Setting detail: DERMATOLOGY
End: 2025-01-30

## 2025-01-30 DIAGNOSIS — Z85.820 PERSONAL HISTORY OF MALIGNANT MELANOMA OF SKIN: ICD-10-CM

## 2025-01-30 DIAGNOSIS — L57.0 ACTINIC KERATOSIS: ICD-10-CM

## 2025-01-30 DIAGNOSIS — L81.4 OTHER MELANIN HYPERPIGMENTATION: ICD-10-CM

## 2025-01-30 DIAGNOSIS — D22 MELANOCYTIC NEVI: ICD-10-CM

## 2025-01-30 DIAGNOSIS — Z71.89 OTHER SPECIFIED COUNSELING: ICD-10-CM

## 2025-01-30 DIAGNOSIS — Z85.828 PERSONAL HISTORY OF OTHER MALIGNANT NEOPLASM OF SKIN: ICD-10-CM

## 2025-01-30 PROBLEM — D22.61 MELANOCYTIC NEVI OF RIGHT UPPER LIMB, INCLUDING SHOULDER: Status: ACTIVE | Noted: 2025-01-30

## 2025-01-30 PROBLEM — D22.5 MELANOCYTIC NEVI OF TRUNK: Status: ACTIVE | Noted: 2025-01-30

## 2025-01-30 PROBLEM — D22.62 MELANOCYTIC NEVI OF LEFT UPPER LIMB, INCLUDING SHOULDER: Status: ACTIVE | Noted: 2025-01-30

## 2025-01-30 PROCEDURE — 17000 DESTRUCT PREMALG LESION: CPT

## 2025-01-30 PROCEDURE — ? LIQUID NITROGEN

## 2025-01-30 PROCEDURE — 99213 OFFICE O/P EST LOW 20 MIN: CPT | Mod: 25

## 2025-01-30 PROCEDURE — ? SUNSCREEN RECOMMENDATIONS

## 2025-01-30 PROCEDURE — ? OBSERVATION

## 2025-01-30 PROCEDURE — ? COUNSELING

## 2025-01-30 ASSESSMENT — LOCATION SIMPLE DESCRIPTION DERM
LOCATION SIMPLE: RIGHT UPPER ARM
LOCATION SIMPLE: RIGHT CHEEK
LOCATION SIMPLE: CHEST
LOCATION SIMPLE: SUPERIOR FOREHEAD
LOCATION SIMPLE: RIGHT HAND
LOCATION SIMPLE: LEFT UPPER ARM
LOCATION SIMPLE: LEFT UPPER BACK
LOCATION SIMPLE: LEFT HAND

## 2025-01-30 ASSESSMENT — LOCATION DETAILED DESCRIPTION DERM
LOCATION DETAILED: LEFT ULNAR DORSAL HAND
LOCATION DETAILED: STERNUM
LOCATION DETAILED: RIGHT INFERIOR MEDIAL MALAR CHEEK
LOCATION DETAILED: RIGHT ULNAR DORSAL HAND
LOCATION DETAILED: LEFT SUPERIOR MEDIAL UPPER BACK
LOCATION DETAILED: RIGHT PROXIMAL POSTERIOR UPPER ARM
LOCATION DETAILED: LEFT PROXIMAL POSTERIOR UPPER ARM
LOCATION DETAILED: SUPERIOR MID FOREHEAD
LOCATION DETAILED: LEFT DISTAL POSTERIOR UPPER ARM
LOCATION DETAILED: LEFT ANTERIOR DISTAL UPPER ARM

## 2025-01-30 ASSESSMENT — LOCATION ZONE DERM
LOCATION ZONE: FACE
LOCATION ZONE: ARM
LOCATION ZONE: HAND
LOCATION ZONE: TRUNK

## 2025-01-30 NOTE — PROCEDURE: COUNSELING
Detail Level: Simple
Detail Level: Detailed
When Should The Patient Follow-Up For Their Next Full-Body Skin Exam?: 1 Year
Detail Level: Zone
yes
Quality 137: Melanoma: Continuity Of Care - Recall System: Patient information entered into a recall system that includes: target date for the next exam specified AND a process to follow up with patients regarding missed or unscheduled appointments

## 2025-01-30 NOTE — PROCEDURE: LIQUID NITROGEN
Post-Care Instructions: I reviewed with the patient in detail post-care instructions. Patient is to wear sunprotection, and avoid picking at any of the treated lesions. Pt may apply Vaseline to crusted or scabbing areas.
Render Note In Bullet Format When Appropriate: No
Detail Level: Detailed
Number Of Freeze-Thaw Cycles: 1 freeze-thaw cycle
Consent: The patient's consent was obtained including but not limited to risks of crusting, scabbing, blistering, scarring, darker or lighter pigmentary change, recurrence, incomplete removal and infection.
Aperture Size (Optional): C
Show Applicator Variable?: Yes
Application Tool (Optional): Cry-AC
Duration Of Freeze Thaw-Cycle (Seconds): 3

## 2025-07-31 ENCOUNTER — APPOINTMENT (OUTPATIENT)
Dept: URBAN - METROPOLITAN AREA CLINIC 4 | Facility: CLINIC | Age: 70
Setting detail: DERMATOLOGY
End: 2025-07-31

## 2025-07-31 DIAGNOSIS — L82.1 OTHER SEBORRHEIC KERATOSIS: ICD-10-CM

## 2025-07-31 DIAGNOSIS — L81.4 OTHER MELANIN HYPERPIGMENTATION: ICD-10-CM

## 2025-07-31 DIAGNOSIS — F42.4 EXCORIATION (SKIN-PICKING) DISORDER: ICD-10-CM

## 2025-07-31 DIAGNOSIS — Z85.820 PERSONAL HISTORY OF MALIGNANT MELANOMA OF SKIN: ICD-10-CM

## 2025-07-31 DIAGNOSIS — Z71.89 OTHER SPECIFIED COUNSELING: ICD-10-CM

## 2025-07-31 DIAGNOSIS — Z85.828 PERSONAL HISTORY OF OTHER MALIGNANT NEOPLASM OF SKIN: ICD-10-CM

## 2025-07-31 PROCEDURE — ? DEFER

## 2025-07-31 PROCEDURE — ? ADDITIONAL NOTES

## 2025-07-31 PROCEDURE — ? COUNSELING

## 2025-07-31 PROCEDURE — ? OBSERVATION

## 2025-07-31 PROCEDURE — ? SUNSCREEN RECOMMENDATIONS

## 2025-07-31 ASSESSMENT — LOCATION DETAILED DESCRIPTION DERM
LOCATION DETAILED: LEFT ANTERIOR DISTAL UPPER ARM
LOCATION DETAILED: LEFT ULNAR DORSAL HAND
LOCATION DETAILED: SUPERIOR THORACIC SPINE
LOCATION DETAILED: RIGHT DISTAL POSTERIOR UPPER ARM
LOCATION DETAILED: LEFT SUPERIOR MEDIAL UPPER BACK
LOCATION DETAILED: LEFT PROXIMAL POSTERIOR UPPER ARM
LOCATION DETAILED: SUPERIOR MID FOREHEAD
LOCATION DETAILED: RIGHT ULNAR DORSAL HAND
LOCATION DETAILED: RIGHT PROXIMAL POSTERIOR UPPER ARM

## 2025-07-31 ASSESSMENT — LOCATION ZONE DERM
LOCATION ZONE: ARM
LOCATION ZONE: FACE
LOCATION ZONE: HAND
LOCATION ZONE: TRUNK

## 2025-07-31 ASSESSMENT — LOCATION SIMPLE DESCRIPTION DERM
LOCATION SIMPLE: UPPER BACK
LOCATION SIMPLE: LEFT UPPER ARM
LOCATION SIMPLE: RIGHT HAND
LOCATION SIMPLE: LEFT HAND
LOCATION SIMPLE: LEFT UPPER BACK
LOCATION SIMPLE: RIGHT UPPER ARM
LOCATION SIMPLE: SUPERIOR FOREHEAD

## 2025-08-20 ENCOUNTER — OFFICE VISIT (OUTPATIENT)
Dept: CARDIOLOGY | Facility: MEDICAL CENTER | Age: 70
End: 2025-08-20
Attending: NURSE PRACTITIONER
Payer: COMMERCIAL

## 2025-08-20 VITALS
SYSTOLIC BLOOD PRESSURE: 130 MMHG | WEIGHT: 214.2 LBS | HEIGHT: 69 IN | BODY MASS INDEX: 31.73 KG/M2 | HEART RATE: 72 BPM | OXYGEN SATURATION: 96 % | RESPIRATION RATE: 16 BRPM | DIASTOLIC BLOOD PRESSURE: 60 MMHG

## 2025-08-20 DIAGNOSIS — E78.5 DYSLIPIDEMIA: ICD-10-CM

## 2025-08-20 DIAGNOSIS — Z95.1 S/P CABG (CORONARY ARTERY BYPASS GRAFT): ICD-10-CM

## 2025-08-20 DIAGNOSIS — I25.10 CORONARY ARTERY DISEASE INVOLVING NATIVE CORONARY ARTERY OF NATIVE HEART WITHOUT ANGINA PECTORIS: Primary | ICD-10-CM

## 2025-08-20 DIAGNOSIS — I10 ESSENTIAL HYPERTENSION, BENIGN: ICD-10-CM

## 2025-08-20 PROCEDURE — 3078F DIAST BP <80 MM HG: CPT | Performed by: NURSE PRACTITIONER

## 2025-08-20 PROCEDURE — 99214 OFFICE O/P EST MOD 30 MIN: CPT | Performed by: NURSE PRACTITIONER

## 2025-08-20 PROCEDURE — 99212 OFFICE O/P EST SF 10 MIN: CPT | Performed by: NURSE PRACTITIONER

## 2025-08-20 PROCEDURE — 3075F SYST BP GE 130 - 139MM HG: CPT | Performed by: NURSE PRACTITIONER

## 2025-08-20 ASSESSMENT — ENCOUNTER SYMPTOMS
PALPITATIONS: 0
CLAUDICATION: 0
FEVER: 0
MYALGIAS: 0
DIZZINESS: 0
ABDOMINAL PAIN: 0
PND: 0
SHORTNESS OF BREATH: 0
COUGH: 0
BACK PAIN: 0
ORTHOPNEA: 0

## 2025-08-26 ENCOUNTER — TELEPHONE (OUTPATIENT)
Dept: CARDIOLOGY | Facility: MEDICAL CENTER | Age: 70
End: 2025-08-26
Payer: COMMERCIAL

## 2025-08-26 DIAGNOSIS — I25.10 CORONARY ARTERY DISEASE INVOLVING NATIVE CORONARY ARTERY OF NATIVE HEART WITHOUT ANGINA PECTORIS: ICD-10-CM

## 2025-08-26 DIAGNOSIS — Z95.1 S/P CABG (CORONARY ARTERY BYPASS GRAFT): ICD-10-CM

## 2025-08-26 DIAGNOSIS — E78.5 DYSLIPIDEMIA: ICD-10-CM

## 2025-08-26 DIAGNOSIS — I10 ESSENTIAL HYPERTENSION, BENIGN: ICD-10-CM

## 2025-08-26 DIAGNOSIS — Z02.4 ENCOUNTER FOR DEPARTMENT OF TRANSPORTATION (DOT) EXAMINATION FOR DRIVING LICENSE RENEWAL: ICD-10-CM

## (undated) DEVICE — DRAIN CHEST ADULT (6EA/CA) DELETED ITEM  ORDER #15909

## (undated) DEVICE — GLOVE BIOGEL SZ 8 SURGICAL PF LTX - (50PR/BX 4BX/CA)

## (undated) DEVICE — SET FLUID WARMING STANDARD FLOW - (10/CA)

## (undated) DEVICE — BLADE SURGICAL CLIPPER - (50EA/CA)

## (undated) DEVICE — SODIUM CHL IRRIGATION 0.9% 1000ML (12EA/CA)

## (undated) DEVICE — DRAPE MAYO STAND - (30/CA)

## (undated) DEVICE — MASK ANESTHESIA ADULT  - (100/CA)

## (undated) DEVICE — HEADREST PRONEVIEW LARGE - (10/CA)

## (undated) DEVICE — DRAIN SILICONE CLOSED WOUND SUCTION CHANNEL 24FR ROUND HUBLESS (10/CA)

## (undated) DEVICE — SENSOR SPO2 NEO LNCS ADHESIVE (20/BX) SEE USER NOTES

## (undated) DEVICE — ELECTRODE DUAL RETURN W/ CORD - (50/PK)

## (undated) DEVICE — TUBING CLEARLINK DUO-VENT - C-FLO (48EA/CA)

## (undated) DEVICE — CONNECTOR HUBLESS DRAINAGE - ONE WAY (20/BX)

## (undated) DEVICE — KIT SURGIFLO W/OUT THROMBIN - (6EA/CA)

## (undated) DEVICE — HEAD HOLDER JUNIOR/ADULT

## (undated) DEVICE — SUTURE 5-0 PROLENE C-1 D/A 24 (36PK/BX)"

## (undated) DEVICE — NEPTUNE 4 PORT MANIFOLD - (20/PK)

## (undated) DEVICE — MIDAS LUBRICATOR DIFFUSER PACK (4EA/CA)

## (undated) DEVICE — BLADE STERNUM SAW SURGICAL 32.0 X 6.4 MM STERILE (1/EA)

## (undated) DEVICE — BAG, SPONGE COUNT 50600

## (undated) DEVICE — SUTURE  0 ETHIBOND CT-1 30 IN (36PK/BX)

## (undated) DEVICE — SET LEADWIRE 5 LEAD BEDSIDE DISPOSABLE ECG (1SET OF 5/EA)

## (undated) DEVICE — ARMREST CRADLE FOAM - (2PR/PK 12PR/CA)

## (undated) DEVICE — DRESSING TRANSPARENT FILM TEGADERM 4 X 4.75" (50EA/BX)"

## (undated) DEVICE — DEVICE MONOPOLAR RF PEAK PLASMABLADE 3.0S

## (undated) DEVICE — KIT ANESTHESIA W/CIRCUIT & 3/LT BAG W/FILTER (20EA/CA)

## (undated) DEVICE — SUTURE 5 SURGICAL STEEL V-40 - (12/BX) CCS CURRENT

## (undated) DEVICE — INSERT STEALTH #5 - (10/BX)

## (undated) DEVICE — SUTURE 4-0 30CM STRATAFIX SPIRAL PS-2 (12EA/BX)

## (undated) DEVICE — SYSTEM PEEL & PLACE 13CM INCISIONS

## (undated) DEVICE — LACTATED RINGERS INJ 1000 ML - (14EA/CA 60CA/PF)

## (undated) DEVICE — KIT ENDOHARVEST SYSTEM 8 - MUST ORDER 5 AT A TIME

## (undated) DEVICE — GLOVE SIZE 7.0 SURGEON ACCELERATOR FREE GREEN (50PR/BX 4BX/CA)

## (undated) DEVICE — FIBRILLAR SURGICEL 4X4 - 10/CA

## (undated) DEVICE — PACK NEURO - (2EA/CA)

## (undated) DEVICE — TOWEL STOP TIMEOUT SAFETY FLAG (40EA/CA)

## (undated) DEVICE — GLOVE BIOGEL PI ORTHO SZ 7.5 PF LF (40PR/BX)

## (undated) DEVICE — TUBING INSUFFLATION - (10/BX)

## (undated) DEVICE — RETRACTOR OFF PUMP OCTO ONLY - 10/BX

## (undated) DEVICE — CANISTER SUCTION 3000ML MECHANICAL FILTER AUTO SHUTOFF MEDI-VAC NONSTERILE LF DISP  (40EA/CA)

## (undated) DEVICE — GLOVE BIOGEL SZ 7 SURGICAL PF LTX - (50PR/BX 4BX/CA)

## (undated) DEVICE — PACK E SUTURE USED FOR - OPEN HEART  (5/BX)

## (undated) DEVICE — SLEEVE, VASO, THIGH, MED

## (undated) DEVICE — SOD. CHL. INJ. 0.9% 1000 ML - (14EA/CA 60CA/PF)

## (undated) DEVICE — GOWN WARMING STANDARD FLEX - (30/CA)

## (undated) DEVICE — PACK VEIN - (19/CA)

## (undated) DEVICE — GLOVE BIOGEL INDICATOR SZ 8 SURGICAL PF LTX - (50/BX 4BX/CA)

## (undated) DEVICE — KIT TOURNIQUET DLP (40EA/PK)

## (undated) DEVICE — PUNCH DISP VASCULAR 4.4 - 6/BX

## (undated) DEVICE — SENSOR CEREBRAL AND SOMATIC MONITORING (20/CA)

## (undated) DEVICE — SUCTION INSTRUMENT YANKAUER BULBOUS TIP W/O VENT (50EA/CA)

## (undated) DEVICE — DRAPE SURG STERI-DRAPE 7X11OD - (40EA/CA)

## (undated) DEVICE — DERMABOND ADVANCED - (12EA/BX)

## (undated) DEVICE — BLADE SURGICAL #15 - (50/BX 3BX/CA)

## (undated) DEVICE — LACTATED RINGERS INJ. 500 ML - (24EA/CA)

## (undated) DEVICE — SUTURE OHS

## (undated) DEVICE — SEALER BIPOLAR 2.3 AQUAMANTYS

## (undated) DEVICE — KIT INTROPERCUTANEOUS SHEATH - 8.5 FR W/MAX BARRIER AND BIOPATCH  (5/CA)

## (undated) DEVICE — BLADE BEAVER 6900 MINI (OHS) - 180 DEGREE (20/BX) DUPLICATE ITEM ORDER 3700

## (undated) DEVICE — SYRINGE 30 ML LL (56/BX)

## (undated) DEVICE — ELECTRODE 850 FOAM ADHESIVE - HYDROGEL RADIOTRNSPRNT (50/PK)

## (undated) DEVICE — TUBE CHEST 32FR. STRAIGHT - (10EA/CA)

## (undated) DEVICE — BAG DECANTER (50EA/CS)

## (undated) DEVICE — NEEDLE SAFETY 18 GA X 1 1/2 IN (100EA/BX)

## (undated) DEVICE — SPRING BULLDOG 1/2 FORCE BLUE - (10/BX)

## (undated) DEVICE — TOOL DISSECT MATCH HEAD

## (undated) DEVICE — SPONGE GAUZESTER 4 X 4 4PLY - (128PK/CA)

## (undated) DEVICE — SUTURE 7-0 PROLENE 24BV175-6 - EP8735H (36/BX)"

## (undated) DEVICE — DRAPE LAPAROTOMY T SHEET - (12EA/CA)

## (undated) DEVICE — MICRODRIP PRIMARY VENTED 60 (48EA/CA) THIS WAS PART #2C8428 WHICH WAS DISCONTINUED

## (undated) DEVICE — SUTURE 0 ETHIBOND MO6 C/R - (12/BX) 8-18 INCH ETHICON

## (undated) DEVICE — TRANSDUCER BIFURCATED MONITORING KIT (10EA/CA)

## (undated) DEVICE — DRESSING XEROFORM 1X8 - (50/BX 4BX/CA)

## (undated) DEVICE — PROTECTOR ULNA NERVE - (36PR/CA)

## (undated) DEVICE — BAG RESUSCITATION DISPOSABLE - WITH MASK (10 EA/CA)

## (undated) DEVICE — STOPCOCK MALE 4-WAY - (50/CA)

## (undated) DEVICE — KIT RADIAL ARTERY 20GA W/MAX BARRIER AND BIOPATCH  (5EA/CA) #10740 IS FOR THE SET RADIAL ARTERIAL

## (undated) DEVICE — DRESSING TRANSPARENT FILM TEGADERM 2.375 X 2.75"  (100EA/BX)"

## (undated) DEVICE — TUBE CHEST 32FR. RIGHT ANGLED (10EA/CA)

## (undated) DEVICE — ARMBOARD  SMALL IV 9 INLONG - (25EA/CA)

## (undated) DEVICE — SYRINGE SAFETY 3 ML 18 GA X 1 1/2 BLUNT LL (100/BX 8BX/CA)

## (undated) DEVICE — TRAY SURESTEP FOLEY TEMP SENSING 16FR (10EA/CA) ORDER  #18764 FOR TEMP FOLEY ONLY

## (undated) DEVICE — SET BIFURCATED BLOOD - (48EA/CS)

## (undated) DEVICE — KIT ROOM DECONTAMINATION

## (undated) DEVICE — GLOVE BIOGEL SZ 7.5 SURGICAL PF LTX - (50PR/BX 4BX/CA)

## (undated) DEVICE — TUBING C&T SET FLYING LEADS DRAIN TUBING (10EA/BX)

## (undated) DEVICE — SODIUM CHL. INJ. 0.9% 500ML (24EA/CA 50CA/PF)

## (undated) DEVICE — SET EXTENSION WITH 2 PORTS (48EA/CA) ***PART #2C8610 IS A SUBSTITUTE*****

## (undated) DEVICE — ADHESIVE DERMABOND HVD MINI (12EA/BX)

## (undated) DEVICE — SUTURE 2-0 VICRYL PLUS CT-1 - 8 X 18 INCH(12/BX)

## (undated) DEVICE — SUTURE 1 VICRYL PLUS CTX - 8 X 18 INCH (12/BX)

## (undated) DEVICE — CATHETER THERMALDILUTION SWAN - (5EA/CA)

## (undated) DEVICE — SYS DLV COST CLS RM TEMP - INJECTATE (CO-SET II) (10EA/CA)

## (undated) DEVICE — GOWN SURGEONS X-LARGE - DISP. (30/CA)

## (undated) DEVICE — KIT EVACUATER 3 SPRING PVC LF 1/8 DRAIN SIZE (10EA/CA)"

## (undated) DEVICE — TUBE E-T HI-LO CUFF 7.5MM (10EA/PK)

## (undated) DEVICE — CHLORAPREP 26 ML APPLICATOR - ORANGE TINT(25/CA)

## (undated) DEVICE — PACK CV DRAPING/BASIN 2PART - (1/CA)

## (undated) DEVICE — SUTURE GENERAL

## (undated) DEVICE — SOLUTION NORMOSOL-4 INJ 1000ML

## (undated) DEVICE — SUTURE 6-0 PROLENE RB-2 D/A 30 (36PK/BX)"

## (undated) DEVICE — PATTIES SURG X-RAYCOTTONOID - 1/2 X 3 IN (200/CA)

## (undated) DEVICE — SUTURE 4-0 30CM X 30CM STRATAFIX SPRIAL PGA/PCL CLR FS-2 NEEDLE (12/BX)

## (undated) DEVICE — GLOVE BIOGEL INDICATOR SZ 7SURGICAL PF LTX - (50/BX 4BX/CA)

## (undated) DEVICE — CATHETER ON-Q SILVER SOAKER 5IN  (5EA/CA)  - SUB ORDER #4428